# Patient Record
Sex: MALE | Race: WHITE | NOT HISPANIC OR LATINO | Employment: OTHER | RURAL
[De-identification: names, ages, dates, MRNs, and addresses within clinical notes are randomized per-mention and may not be internally consistent; named-entity substitution may affect disease eponyms.]

---

## 2017-01-23 ENCOUNTER — OFFICE VISIT (OUTPATIENT)
Dept: FAMILY MEDICINE CLINIC | Facility: CLINIC | Age: 82
End: 2017-01-23

## 2017-01-23 VITALS
WEIGHT: 149.6 LBS | BODY MASS INDEX: 23.48 KG/M2 | SYSTOLIC BLOOD PRESSURE: 131 MMHG | DIASTOLIC BLOOD PRESSURE: 63 MMHG | TEMPERATURE: 97.3 F | HEIGHT: 67 IN | HEART RATE: 66 BPM

## 2017-01-23 DIAGNOSIS — D69.6 THROMBOCYTOPENIA (HCC): ICD-10-CM

## 2017-01-23 DIAGNOSIS — I10 ESSENTIAL HYPERTENSION: Primary | ICD-10-CM

## 2017-01-23 LAB
BASOPHILS # BLD AUTO: 0.07 10*3/MM3 (ref 0–0.3)
BASOPHILS NFR BLD AUTO: 0.7 % (ref 0–2)
DEPRECATED RDW RBC AUTO: 43.6 FL (ref 37–54)
EOSINOPHIL # BLD AUTO: 0.18 10*3/MM3 (ref 0–0.7)
EOSINOPHIL NFR BLD AUTO: 1.9 % (ref 0–7)
ERYTHROCYTE [DISTWIDTH] IN BLOOD BY AUTOMATED COUNT: 13.3 % (ref 11.5–14.5)
HCT VFR BLD AUTO: 42.3 % (ref 42–52)
HGB BLD-MCNC: 14 G/DL (ref 14–18)
IMM GRANULOCYTES # BLD: 0.03 10*3/MM3 (ref 0–0.03)
IMM GRANULOCYTES NFR BLD: 0.3 % (ref 0–0.5)
LYMPHOCYTES # BLD AUTO: 1.25 10*3/MM3 (ref 1–3)
LYMPHOCYTES NFR BLD AUTO: 13.2 % (ref 16–46)
MCH RBC QN AUTO: 30.7 PG (ref 27–33)
MCHC RBC AUTO-ENTMCNC: 33.1 G/DL (ref 33–37)
MCV RBC AUTO: 92.8 FL (ref 80–94)
MONOCYTES # BLD AUTO: 0.83 10*3/MM3 (ref 0.1–0.9)
MONOCYTES NFR BLD AUTO: 8.8 % (ref 0–12)
NEUTROPHILS # BLD AUTO: 7.08 10*3/MM3 (ref 1.4–6.5)
NEUTROPHILS NFR BLD AUTO: 75.1 % (ref 40–75)
PLATELET # BLD AUTO: 133 10*3/MM3 (ref 130–400)
PMV BLD AUTO: 12.5 FL (ref 6–10)
RBC # BLD AUTO: 4.56 10*6/MM3 (ref 4.7–6.1)
WBC NRBC COR # BLD: 9.44 10*3/MM3 (ref 4.5–12.5)

## 2017-01-23 PROCEDURE — 99213 OFFICE O/P EST LOW 20 MIN: CPT | Performed by: FAMILY MEDICINE

## 2017-01-23 PROCEDURE — 36415 COLL VENOUS BLD VENIPUNCTURE: CPT | Performed by: FAMILY MEDICINE

## 2017-01-23 PROCEDURE — 85025 COMPLETE CBC W/AUTO DIFF WBC: CPT | Performed by: FAMILY MEDICINE

## 2017-01-23 NOTE — MR AVS SNAPSHOT
Hugo Colon   1/23/2017 10:40 AM   Office Visit    Dept Phone:  [phone]   Encounter #:  [id number]    Provider:  Sincere Jeffries MD   Department:  [facility]                Your Full Care Plan              Your Updated Medication List          This list is accurate as of: 1/23/17 11:48 AM.  Always use your most recent med list.                ASPIRIN LOW DOSE 81 MG tablet   Generic drug:  aspirin       benzonatate 100 MG capsule   Commonly known as:  TESSALON PERLES   Take 1 capsule by mouth 3 (Three) Times a Day As Needed for cough.       * CENTRUM SILVER ADULT 50+ tablet       * ICAPS PO       DUREZOL 0.05 % ophthalmic emulsion   Generic drug:  difluprednate       LORazepam 1 MG tablet   Commonly known as:  ATIVAN       losartan 50 MG tablet   Commonly known as:  COZAAR   Take 1 tablet by mouth Daily.       METAMUCIL 28.3 % powder   Generic drug:  Psyllium       simvastatin 10 MG tablet   Commonly known as:  ZOCOR   TAKE 1 TABLET DAILY AT BEDTIME       * Notice:  This list has 2 medication(s) that are the same as other medications prescribed for you. Read the directions carefully, and ask your doctor or other care provider to review them with you.            We Performed the Following     CBC & Differential       You Were Diagnosed With        Codes Comments    Essential hypertension    -  Primary ICD-10-CM: I10  ICD-9-CM: 401.9     Thrombocytopenia     ICD-10-CM: D69.6  ICD-9-CM: 287.5       Instructions     None    Patient Instructions History      Upcoming Appointments     Visit Type Date Time Department    FOLLOW UP 1/23/2017 10:40 AM DENNIS SCHULZ    FOLLOW UP 3/23/2017 11:20 AM REDDY SCHULZ      MyChart Signup     Our records indicate that you have declined [facility] sign[facility]. If you would like to sign up for [employer], please email [email or web address] [email or web address] or call [phone] to obtain an activation code.      "          Other Info from Your Visit           Your Appointments     Mar 23, 2017 11:20 AM EDT   Follow Up with Sincere Jeffries MD   [facility] (--)    [address] [place] [id number] [phone]           Arrive 15 minutes prior to appointment.              Allergies     Levofloxacin      Penicillins  Other (See Comments)    Made arms blue      Reason for Visit     Hypertension 2 month follow up      Vital Signs     Blood Pressure Pulse Temperature Height Weight Body Mass Index    131/63 (BP Location: Left arm, Patient Position: Sitting) 66 97.3 °F (36.3 °C) (Oral) 66.5\" (168.9 cm) 149 lb 9.6 oz (67.9 kg) 23.78 kg/m2    Smoking Status                   Former Smoker           Problems and Diagnoses Noted     High blood pressure    Decreased platelet count        "

## 2017-01-23 NOTE — PROGRESS NOTES
"Subjective   Hugo Colon is a 86 y.o. male.     History of Present Illness follow-up regarding hypertension lately count low.  Generally doing well.  Staying active.  Tolerating medications at current dosing.  [facility] [employer] [facility] days for meal.  Sleep is adequate.  No concern with ADLs.    The following portions of the patient's history were reviewed and updated as appropriate: allergies, current medications, past family history, past social history, past surgical history and problem list.    Review of Systems   Constitutional: Negative.    HENT: Negative.    Eyes: Negative.    Respiratory: Negative.    Cardiovascular: Negative.    Gastrointestinal: Negative.    Endocrine: Negative.    Genitourinary: Negative.    Musculoskeletal: Negative.    Skin: Negative.    Allergic/Immunologic: Negative.    Neurological: Negative.    Hematological: Negative.    Psychiatric/Behavioral: Negative.        Objective   Physical Exam   Constitutional: He is oriented to person, place, and time. He appears well-developed and well-nourished.   HENT:   Head: Normocephalic.   Mouth/Throat: Oropharynx is clear and moist.   Neck: Normal range of motion. Neck supple.   Cardiovascular: Normal rate, regular rhythm and normal heart sounds.    No murmur heard.  Pulmonary/Chest: Effort normal and breath sounds normal.   Neurological: He is alert and oriented to person, place, and time.   Skin: Skin is warm and dry.   Psychiatric: He has a normal mood and affect.   Vitals reviewed.    Visit Vitals   • /63 (BP Location: Left arm, Patient Position: Sitting)   • Pulse 66   • Temp 97.3 °F (36.3 °C) (Oral)   • Ht 66.5\" (168.9 cm)   • Wt 149 lb 9.6 oz (67.9 kg)   • BMI 23.78 kg/m2     Assessment/Plan   Problems Addressed this Visit        Cardiovascular and Mediastinum    Essential hypertension - Primary       Hematopoietic and Hemostatic    Thrombocytopenia    Relevant Orders    CBC & Differential    CBC Auto Differential      Blood " pressure looks good.  You appear to be doing exceptionally well.  Today will check labs to monitor platelet count.  Continue all medications as ordered reconciled.  Recheck in about 2 months or as needed.  Stay safely active.

## 2017-03-23 ENCOUNTER — OFFICE VISIT (OUTPATIENT)
Dept: FAMILY MEDICINE CLINIC | Facility: CLINIC | Age: 82
End: 2017-03-23

## 2017-03-23 VITALS
DIASTOLIC BLOOD PRESSURE: 68 MMHG | HEART RATE: 63 BPM | SYSTOLIC BLOOD PRESSURE: 124 MMHG | BODY MASS INDEX: 23.49 KG/M2 | WEIGHT: 149.7 LBS | HEIGHT: 67 IN | TEMPERATURE: 97 F

## 2017-03-23 DIAGNOSIS — G47.9 DYSSOMNIA: Primary | ICD-10-CM

## 2017-03-23 DIAGNOSIS — I10 ESSENTIAL HYPERTENSION: ICD-10-CM

## 2017-03-23 PROCEDURE — 99213 OFFICE O/P EST LOW 20 MIN: CPT | Performed by: FAMILY MEDICINE

## 2017-03-24 NOTE — PROGRESS NOTES
"Subjective   Hugo Colon is a 87 y.o. male.     History of Present Illness follow-up on blood pressure.  Tolerating medications as currently dosed.  Is enjoying life at this time.  Maintaining good social interactions.  Having no new problems.  Sleep patterns are stable.    The following portions of the patient's history were reviewed and updated as appropriate: allergies, past family history, past medical history, past social history, past surgical history and problem list.    Review of Systems   Constitutional: Negative.    HENT: Negative.    Eyes: Negative.    Respiratory: Negative.    Cardiovascular: Negative.    Gastrointestinal: Negative.    Endocrine: Negative.    Genitourinary: Negative.    Musculoskeletal: Negative.    Skin: Negative.    Allergic/Immunologic: Negative.    Neurological: Negative.    Hematological: Negative.    Psychiatric/Behavioral: Negative.        Objective   Physical Exam   Constitutional: He is oriented to person, place, and time. He appears well-developed and well-nourished.   HENT:   Head: Normocephalic.   Mouth/Throat: Oropharynx is clear and moist.   Neck: Normal range of motion. Neck supple.   Cardiovascular: Normal rate, regular rhythm and normal heart sounds.    No murmur heard.  Pulmonary/Chest: Effort normal and breath sounds normal.   Neurological: He is alert and oriented to person, place, and time.   Skin: Skin is warm and dry.   Psychiatric: He has a normal mood and affect.   Vitals reviewed.    /68 (BP Location: Left arm, Patient Position: Sitting)  Pulse 63  Temp 97 °F (36.1 °C) (Oral)   Ht 66.5\" (168.9 cm)  Wt 149 lb 11.2 oz (67.9 kg)  BMI 23.8 kg/m2  Assessment/Plan   Hugo was seen today for hypertension.    Diagnoses and all orders for this visit:    Dyssomnia    Essential hypertension      No changes.  Blood pressure looks good.  Stay safely active.  Maintain heart healthy diet.  Maintain medications as reconciled ordered.  Follow-up in a few months " routinely.

## 2017-05-22 ENCOUNTER — OFFICE VISIT (OUTPATIENT)
Dept: FAMILY MEDICINE CLINIC | Facility: CLINIC | Age: 82
End: 2017-05-22

## 2017-05-22 VITALS
SYSTOLIC BLOOD PRESSURE: 117 MMHG | HEART RATE: 66 BPM | TEMPERATURE: 97.2 F | DIASTOLIC BLOOD PRESSURE: 67 MMHG | BODY MASS INDEX: 24 KG/M2 | HEIGHT: 67 IN | WEIGHT: 152.9 LBS

## 2017-05-22 DIAGNOSIS — I10 ESSENTIAL HYPERTENSION: Primary | ICD-10-CM

## 2017-05-22 DIAGNOSIS — G47.9 DYSSOMNIA: ICD-10-CM

## 2017-05-22 PROCEDURE — 99213 OFFICE O/P EST LOW 20 MIN: CPT | Performed by: FAMILY MEDICINE

## 2017-05-22 RX ORDER — LORAZEPAM 1 MG/1
1 TABLET ORAL NIGHTLY
Qty: 30 TABLET | Refills: 2
Start: 2017-05-22 | End: 2019-01-01 | Stop reason: ALTCHOICE

## 2017-08-31 ENCOUNTER — OFFICE VISIT (OUTPATIENT)
Dept: FAMILY MEDICINE CLINIC | Facility: CLINIC | Age: 82
End: 2017-08-31

## 2017-08-31 VITALS
SYSTOLIC BLOOD PRESSURE: 131 MMHG | WEIGHT: 154.7 LBS | DIASTOLIC BLOOD PRESSURE: 65 MMHG | TEMPERATURE: 97.9 F | HEIGHT: 67 IN | BODY MASS INDEX: 24.28 KG/M2 | HEART RATE: 64 BPM

## 2017-08-31 DIAGNOSIS — J30.89 SEASONAL ALLERGIC RHINITIS DUE TO OTHER ALLERGIC TRIGGER: Primary | ICD-10-CM

## 2017-08-31 PROCEDURE — 96372 THER/PROPH/DIAG INJ SC/IM: CPT | Performed by: NURSE PRACTITIONER

## 2017-08-31 PROCEDURE — 99213 OFFICE O/P EST LOW 20 MIN: CPT | Performed by: NURSE PRACTITIONER

## 2017-08-31 RX ORDER — METHYLPREDNISOLONE ACETATE 80 MG/ML
80 INJECTION, SUSPENSION INTRA-ARTICULAR; INTRALESIONAL; INTRAMUSCULAR; SOFT TISSUE ONCE
Status: COMPLETED | OUTPATIENT
Start: 2017-08-31 | End: 2017-08-31

## 2017-08-31 RX ORDER — LOSARTAN POTASSIUM 50 MG/1
50 TABLET ORAL DAILY
Qty: 30 TABLET | Refills: 6 | Status: SHIPPED | OUTPATIENT
Start: 2017-08-31 | End: 2018-11-16 | Stop reason: SDUPTHER

## 2017-08-31 RX ADMIN — METHYLPREDNISOLONE ACETATE 80 MG: 80 INJECTION, SUSPENSION INTRA-ARTICULAR; INTRALESIONAL; INTRAMUSCULAR; SOFT TISSUE at 09:17

## 2017-10-20 ENCOUNTER — TELEPHONE (OUTPATIENT)
Dept: FAMILY MEDICINE CLINIC | Facility: CLINIC | Age: 82
End: 2017-10-20

## 2017-10-20 NOTE — TELEPHONE ENCOUNTER
I called the pharmacy, we sent on 8/31/17, they state they do have that prescription on file.  Tried to call the pt and let  Him know but no answer

## 2017-12-20 ENCOUNTER — OFFICE VISIT (OUTPATIENT)
Dept: RETAIL CLINIC | Facility: CLINIC | Age: 82
End: 2017-12-20

## 2017-12-20 VITALS
RESPIRATION RATE: 20 BRPM | WEIGHT: 160 LBS | HEART RATE: 64 BPM | TEMPERATURE: 99.8 F | BODY MASS INDEX: 25.44 KG/M2 | OXYGEN SATURATION: 98 %

## 2017-12-20 DIAGNOSIS — J10.1 INFLUENZA A: Primary | ICD-10-CM

## 2017-12-20 LAB
EXPIRATION DATE: ABNORMAL
FLUAV AG NPH QL: POSITIVE
FLUBV AG NPH QL: NEGATIVE
INTERNAL CONTROL: ABNORMAL
Lab: ABNORMAL

## 2017-12-20 PROCEDURE — 87804 INFLUENZA ASSAY W/OPTIC: CPT | Performed by: NURSE PRACTITIONER

## 2017-12-20 PROCEDURE — 99213 OFFICE O/P EST LOW 20 MIN: CPT | Performed by: NURSE PRACTITIONER

## 2017-12-20 RX ORDER — BENZONATATE 100 MG/1
200 CAPSULE ORAL NIGHTLY
Qty: 10 CAPSULE | Refills: 0 | Status: SHIPPED | OUTPATIENT
Start: 2017-12-20 | End: 2017-12-30

## 2017-12-20 NOTE — PROGRESS NOTES
Subjective   Hugo Colon is a 87 y.o. male.   Chief Complaint   Patient presents with   • URI      URI    This is a new problem. The current episode started in the past 7 days. The problem has been waxing and waning. The maximum temperature recorded prior to his arrival was 100.4 - 100.9 F. Associated symptoms include congestion, coughing (non-productive keeps him up at night), headaches, rhinorrhea (clear) and a sore throat. Pertinent negatives include no rash. He has tried NSAIDs (yesterday, none today) for the symptoms. The treatment provided mild relief.          Hugo presents to [facility] with cc of cough, congestion, low grade fever for several days (4-5).  Reviewed [facility], has had his annual Flu Vaccine.  See ROS.        The following portions of the patient's history were reviewed and updated as appropriate: allergies, current medications, past family history, past medical history, past social history, past surgical history and problem list.    Review of Systems   Constitutional: Positive for chills and fever.   HENT: Positive for congestion, rhinorrhea (clear) and sore throat.    Respiratory: Positive for cough (non-productive keeps him up at night).    Musculoskeletal: Positive for arthralgias and myalgias.   Skin: Negative for rash.   Neurological: Positive for headaches.     Pulse 64  Temp 99.8 °F (37.7 °C) (Temporal Artery )   Resp 20  Wt 72.6 kg (160 lb)  SpO2 98%  BMI 25.44 kg/m2    Objective     Current Outpatient Prescriptions:   •  aspirin (ASPIRIN LOW DOSE) 81 MG tablet, Take  by mouth daily., Disp: , Rfl:   •  Difluprednate (DUREZOL) 0.05 % emulsion, Apply  to eye 4 (Four) Times a Day As Needed., Disp: , Rfl:   •  LORazepam (ATIVAN) 1 MG tablet, Take 1 tablet by mouth Every Night., Disp: 30 tablet, Rfl: 2  •  losartan (COZAAR) 50 MG tablet, Take 1 tablet by mouth Daily., Disp: 30 tablet, Rfl: 6  •  Multiple Vitamins-Minerals (CENTRUM SILVER ADULT 50+) tablet, Take  by mouth daily., Disp: , Rfl:    •  Multiple Vitamins-Minerals (ICAPS PO), Take 1 tablet by mouth 2 (Two) Times a Day., Disp: , Rfl:   •  Psyllium (METAMUCIL) 28.3 % powder, Take  by mouth daily as needed., Disp: , Rfl:   •  simvastatin (ZOCOR) 10 MG tablet, TAKE 1 TABLET DAILY AT BEDTIME, Disp: 90 tablet, Rfl: 3  •  benzonatate (TESSALON PERLES) 100 MG capsule, Take 2 capsules by mouth Every Night for 10 days., Disp: 10 capsule, Rfl: 0  Allergies   Allergen Reactions   • Levofloxacin    • Penicillins Other (See Comments)     Made arms blue       Physical Exam   Constitutional: He is oriented to person, place, and time. He appears well-developed and well-nourished. No distress.   HENT:   Head: Normocephalic.   Right Ear: Tympanic membrane and external ear normal.   Left Ear: Tympanic membrane and external ear normal.   Nose: Mucosal edema present. Right sinus exhibits no maxillary sinus tenderness and no frontal sinus tenderness. Left sinus exhibits no maxillary sinus tenderness and no frontal sinus tenderness.   Mouth/Throat: Uvula is midline and mucous membranes are normal. Posterior oropharyngeal erythema (mild) present.   Eyes: Conjunctivae and EOM are normal. Pupils are equal, round, and reactive to light.   Neck: Normal range of motion. Neck supple.   Cardiovascular: Normal rate, regular rhythm and normal heart sounds.    Pulmonary/Chest: Effort normal and breath sounds normal. No respiratory distress.   Abdominal: Soft. Bowel sounds are normal. He exhibits no distension. There is no tenderness.   Musculoskeletal: Normal range of motion.   Lymphadenopathy:     He has no cervical adenopathy.   Neurological: He is alert and oriented to person, place, and time.   Skin: Skin is warm and dry. No rash noted.   Psychiatric: He has a normal mood and affect. His behavior is normal. Judgment and thought content normal.   Nursing note and vitals reviewed.      Assessment/Plan   Hugo was seen today for uri.    Diagnoses and all orders for this  visit:    Influenza A  -     POC Influenza A / B  -     benzonatate (TESSALON PERLES) 100 MG capsule; Take 2 capsules by mouth Every Night for 10 days.      Results for orders placed or performed in visit on 12/20/17   POC Influenza A / B   Result Value Ref Range    Rapid Influenza A Ag positive     Rapid Influenza B Ag negative     Internal Control Passed Passed    Lot Number [id number]     Expiration Date 4/19

## 2017-12-20 NOTE — PATIENT INSTRUCTIONS
"Influenza, Adult  Influenza, more commonly known as \"the flu,\" is a viral infection that primarily affects the respiratory tract. The respiratory tract includes organs that help you breathe, such as the lungs, nose, and throat. The flu causes many common cold symptoms, as well as a high fever and body aches.  The flu spreads easily from person to person (is contagious). Getting a flu shot (influenza vaccination) every year is the best way to prevent influenza.  CAUSES  Influenza is caused by a virus. You can catch the virus by:  · Breathing in droplets from an infected person's cough or sneeze.  · Touching something that was recently contaminated with the virus and then touching your mouth, nose, or eyes.  RISK FACTORS  The following factors may make you more likely to get the flu:  · Not cleaning your hands frequently with soap and water or alcohol-based hand .  · Having close contact with many people during cold and flu season.  · Touching your mouth, eyes, or nose without washing or sanitizing your hands first.  · Not drinking enough fluids or not eating a healthy diet.  · Not getting enough sleep or exercise.  · Being under a high amount of stress.  · Not getting a yearly (annual) flu shot.  You may be at a higher risk of complications from the flu, such as a severe lung infection (pneumonia), if you:  · Are over the age of 65.  · Are pregnant.  · Have a weakened disease-fighting system (immune system). You may have a weakened immune system if you:    Have HIV or AIDS.    Are undergoing chemotherapy.    Are taking medicines that reduce the activity of (suppress) the immune system.  · Have a long-term (chronic) illness, such as heart disease, kidney disease, diabetes, or lung disease.  · Have a liver disorder.  · Are obese.  · Have anemia.  SYMPTOMS  Symptoms of this condition typically last 4-10 days and may include:  · Fever.  · Chills.  · Headache, body aches, or muscle aches.  · Sore " throat.  · Cough.  · Runny or congested nose.  · Chest discomfort and cough.  · Poor appetite.  · Weakness or tiredness (fatigue).  · Dizziness.  · Nausea or vomiting.  DIAGNOSIS  This condition may be diagnosed based on your medical history and a physical exam. Your health care provider may do a nose or throat swab test to confirm the diagnosis.  TREATMENT  If influenza is detected early, you can be treated with antiviral medicine that can reduce the length of your illness and the severity of your symptoms. This medicine may be given by mouth (orally) or through an IV tube that is inserted in one of your veins.  The goal of treatment is to relieve symptoms by taking care of yourself at home. This may include taking over-the-counter medicines, drinking plenty of fluids, and adding humidity to the air in your home.  In some cases, influenza goes away on its own. Severe influenza or complications from influenza may be treated in a hospital.  HOME CARE INSTRUCTIONS  · Take over-the-counter and prescription medicines only as told by your health care provider.  · Use a cool mist humidifier to add humidity to the air in your home. This can make breathing easier.  · Rest as needed.  · Drink enough fluid to keep your urine clear or pale yellow.  · Cover your mouth and nose when you cough or sneeze.  · Wash your hands with soap and water often, especially after you cough or sneeze. If soap and water are not available, use hand .  · Stay home from work or school as told by your health care provider. Unless you are visiting your health care provider, try to avoid leaving home until your fever has been gone for 24 hours without the use of medicine.  · Keep all follow-up visits as told by your health care provider. This is important.  PREVENTION  · Getting an annual flu shot is the best way to avoid getting the flu. You may get the flu shot in late summer, fall, or winter. Ask your health care provider when you should  get your flu shot.  · Wash your hands often or use hand  often.  · Avoid contact with people who are sick during cold and flu season.  · Eat a healthy diet, drink plenty of fluids, get enough sleep, and exercise regularly.  SEEK MEDICAL CARE IF:  · You develop new symptoms.  · You have:    Chest pain.    Diarrhea.    A fever.  · Your cough gets worse.  · You produce more mucus.  · You feel nauseous or you vomit.  SEEK IMMEDIATE MEDICAL CARE IF:  · You develop shortness of breath or difficulty breathing.  · Your skin or nails turn a bluish color.  · You have severe pain or stiffness in your neck.  · You develop a sudden headache or sudden pain in your face or ear.  · You cannot stop vomiting.     This information is not intended to replace advice given to you by your health care provider. Make sure you discuss any questions you have with your health care provider.     Document Released: 12/15/2001 Document Revised: 04/10/2017 Document Reviewed: 10/11/2016  [employer] Interactive Patient Education ©2017 [employer]

## 2017-12-22 RX ORDER — SIMVASTATIN 10 MG
TABLET ORAL
Qty: 90 TABLET | Refills: 3 | OUTPATIENT
Start: 2017-12-22

## 2017-12-27 ENCOUNTER — TELEPHONE (OUTPATIENT)
Dept: FAMILY MEDICINE CLINIC | Facility: CLINIC | Age: 82
End: 2017-12-27

## 2017-12-27 NOTE — TELEPHONE ENCOUNTER
He thinks they just gave him a shot and he used up what ever pills they gave him, he was not sure what they were.

## 2017-12-27 NOTE — TELEPHONE ENCOUNTER
HAS A COUGH STARTED COUPLE DAYS AGO. DOESN'T HAVE FEVER.  CAN YOU SEND SOMETHING IN FOR HIM.  LEANNE  [phone]

## 2018-11-16 ENCOUNTER — OFFICE VISIT (OUTPATIENT)
Dept: FAMILY MEDICINE CLINIC | Facility: CLINIC | Age: 83
End: 2018-11-16

## 2018-11-16 VITALS
BODY MASS INDEX: 25.3 KG/M2 | TEMPERATURE: 97.3 F | WEIGHT: 157.4 LBS | SYSTOLIC BLOOD PRESSURE: 156 MMHG | HEART RATE: 67 BPM | DIASTOLIC BLOOD PRESSURE: 79 MMHG | HEIGHT: 66 IN

## 2018-11-16 DIAGNOSIS — Z12.5 PROSTATE CANCER SCREENING: ICD-10-CM

## 2018-11-16 DIAGNOSIS — J40 BRONCHITIS: ICD-10-CM

## 2018-11-16 DIAGNOSIS — I49.9 IRREGULAR HEART RHYTHM: Primary | ICD-10-CM

## 2018-11-16 DIAGNOSIS — J01.10 ACUTE NON-RECURRENT FRONTAL SINUSITIS: ICD-10-CM

## 2018-11-16 DIAGNOSIS — Z13.220 SCREENING FOR HYPERLIPIDEMIA: ICD-10-CM

## 2018-11-16 LAB
ALBUMIN SERPL-MCNC: 4 G/DL (ref 3.4–4.8)
ALBUMIN/GLOB SERPL: 1.5 G/DL (ref 1.5–2.5)
ALP SERPL-CCNC: 73 U/L (ref 40–129)
ALT SERPL W P-5'-P-CCNC: 17 U/L (ref 10–44)
ANION GAP SERPL CALCULATED.3IONS-SCNC: 4.3 MMOL/L (ref 3.6–11.2)
AST SERPL-CCNC: 21 U/L (ref 10–34)
BASOPHILS # BLD AUTO: 0.09 10*3/MM3 (ref 0–0.3)
BASOPHILS NFR BLD AUTO: 0.9 % (ref 0–2)
BILIRUB SERPL-MCNC: 0.7 MG/DL (ref 0.2–1.8)
BUN BLD-MCNC: 26 MG/DL (ref 7–21)
BUN/CREAT SERPL: 21.7 (ref 7–25)
CALCIUM SPEC-SCNC: 8.9 MG/DL (ref 7.7–10)
CHLORIDE SERPL-SCNC: 111 MMOL/L (ref 99–112)
CHOLEST SERPL-MCNC: 124 MG/DL (ref 0–200)
CO2 SERPL-SCNC: 28.7 MMOL/L (ref 24.3–31.9)
CREAT BLD-MCNC: 1.2 MG/DL (ref 0.43–1.29)
DEPRECATED RDW RBC AUTO: 42.3 FL (ref 37–54)
EOSINOPHIL # BLD AUTO: 0.59 10*3/MM3 (ref 0–0.7)
EOSINOPHIL NFR BLD AUTO: 6 % (ref 0–7)
ERYTHROCYTE [DISTWIDTH] IN BLOOD BY AUTOMATED COUNT: 12.8 % (ref 11.5–14.5)
GFR SERPL CREATININE-BSD FRML MDRD: 57 ML/MIN/1.73
GLOBULIN UR ELPH-MCNC: 2.6 GM/DL
GLUCOSE BLD-MCNC: 96 MG/DL (ref 70–110)
HCT VFR BLD AUTO: 42.2 % (ref 42–52)
HDLC SERPL-MCNC: 38 MG/DL (ref 60–100)
HGB BLD-MCNC: 14.5 G/DL (ref 14–18)
IMM GRANULOCYTES # BLD: 0.02 10*3/MM3 (ref 0–0.03)
IMM GRANULOCYTES NFR BLD: 0.2 % (ref 0–0.5)
LDLC SERPL CALC-MCNC: 76 MG/DL (ref 0–100)
LDLC/HDLC SERPL: 2 {RATIO}
LYMPHOCYTES # BLD AUTO: 1.33 10*3/MM3 (ref 1–3)
LYMPHOCYTES NFR BLD AUTO: 13.6 % (ref 16–46)
MCH RBC QN AUTO: 31.9 PG (ref 27–33)
MCHC RBC AUTO-ENTMCNC: 34.4 G/DL (ref 33–37)
MCV RBC AUTO: 92.7 FL (ref 80–94)
MONOCYTES # BLD AUTO: 0.89 10*3/MM3 (ref 0.1–0.9)
MONOCYTES NFR BLD AUTO: 9.1 % (ref 0–12)
NEUTROPHILS # BLD AUTO: 6.88 10*3/MM3 (ref 1.4–6.5)
NEUTROPHILS NFR BLD AUTO: 70.2 % (ref 40–75)
OSMOLALITY SERPL CALC.SUM OF ELEC: 291.5 MOSM/KG (ref 273–305)
PLATELET # BLD AUTO: 168 10*3/MM3 (ref 130–400)
PMV BLD AUTO: 11.8 FL (ref 6–10)
POTASSIUM BLD-SCNC: 4.9 MMOL/L (ref 3.5–5.3)
PROT SERPL-MCNC: 6.6 G/DL (ref 6–8)
PSA SERPL-MCNC: 1.87 NG/ML (ref 0–4)
RBC # BLD AUTO: 4.55 10*6/MM3 (ref 4.7–6.1)
SODIUM BLD-SCNC: 144 MMOL/L (ref 135–153)
TRIGL SERPL-MCNC: 50 MG/DL (ref 0–150)
VLDLC SERPL-MCNC: 10 MG/DL
WBC NRBC COR # BLD: 9.8 10*3/MM3 (ref 4.5–12.5)

## 2018-11-16 PROCEDURE — 99214 OFFICE O/P EST MOD 30 MIN: CPT | Performed by: NURSE PRACTITIONER

## 2018-11-16 PROCEDURE — G0103 PSA SCREENING: HCPCS | Performed by: NURSE PRACTITIONER

## 2018-11-16 PROCEDURE — 80053 COMPREHEN METABOLIC PANEL: CPT | Performed by: NURSE PRACTITIONER

## 2018-11-16 PROCEDURE — 93005 ELECTROCARDIOGRAM TRACING: CPT | Performed by: NURSE PRACTITIONER

## 2018-11-16 PROCEDURE — 96372 THER/PROPH/DIAG INJ SC/IM: CPT | Performed by: NURSE PRACTITIONER

## 2018-11-16 PROCEDURE — 36415 COLL VENOUS BLD VENIPUNCTURE: CPT | Performed by: NURSE PRACTITIONER

## 2018-11-16 PROCEDURE — 85025 COMPLETE CBC W/AUTO DIFF WBC: CPT | Performed by: NURSE PRACTITIONER

## 2018-11-16 PROCEDURE — 80061 LIPID PANEL: CPT | Performed by: NURSE PRACTITIONER

## 2018-11-16 RX ORDER — CHOLECALCIFEROL (VITAMIN D3) 125 MCG
5000 CAPSULE ORAL DAILY
COMMUNITY
End: 2019-01-01 | Stop reason: ALTCHOICE

## 2018-11-16 RX ORDER — DEXAMETHASONE SODIUM PHOSPHATE 4 MG/ML
4 INJECTION, SOLUTION INTRA-ARTICULAR; INTRALESIONAL; INTRAMUSCULAR; INTRAVENOUS; SOFT TISSUE ONCE
Status: COMPLETED | OUTPATIENT
Start: 2018-11-16 | End: 2018-11-16

## 2018-11-16 RX ORDER — BENZONATATE 100 MG/1
100 CAPSULE ORAL 3 TIMES DAILY PRN
Qty: 30 CAPSULE | Refills: 0 | Status: SHIPPED | OUTPATIENT
Start: 2018-11-16 | End: 2019-01-01

## 2018-11-16 RX ORDER — AZITHROMYCIN 250 MG/1
TABLET, FILM COATED ORAL
Qty: 6 TABLET | Refills: 0 | Status: SHIPPED | OUTPATIENT
Start: 2018-11-16 | End: 2019-01-01

## 2018-11-16 RX ORDER — LOSARTAN POTASSIUM 50 MG/1
50 TABLET ORAL DAILY
Qty: 30 TABLET | Refills: 6 | Status: SHIPPED | OUTPATIENT
Start: 2018-11-16 | End: 2019-01-01 | Stop reason: ALTCHOICE

## 2018-11-16 RX ADMIN — DEXAMETHASONE SODIUM PHOSPHATE 4 MG: 4 INJECTION, SOLUTION INTRA-ARTICULAR; INTRALESIONAL; INTRAMUSCULAR; INTRAVENOUS; SOFT TISSUE at 11:15

## 2018-11-16 NOTE — PROGRESS NOTES
Here today for report about 1 week of respiratory symptoms.  Greenish tent to mucuos when he coughs.  Sinus congestion noted, but unable to blow any mucous from nose.  Sinus pressure.  Had flu shot this year already.  Has some chills.

## 2018-11-16 NOTE — PROGRESS NOTES
Subjective   Hugo Colon is a 88 y.o. male.     Patient is presenting today for new symptoms of cough and sinus congestion.  Has been taking over-the-counter medications at home with no improvement.  He is in no acute distress today.  Denies any chest pain palpitations dizziness or other cardiopulmonary issues today.  He is reporting being unable to sleep well due to his cough that increases when he lies down.  He is afebrile today and vital signs are stable.  He denies fever does report having some chills over the last couple of days.  Coughing up mucus with green shade reported.  Denies any blood in sputum.  He is also prudent presenting for continued management of hypertension.  He is fasting today for blood work.         The following portions of the patient's history were reviewed and updated as appropriate: allergies, current medications, past family history, past medical history, past social history, past surgical history and problem list.    Review of Systems   Constitutional: Positive for chills and fatigue. Negative for appetite change and fever.   HENT: Positive for sinus pressure and sinus pain. Negative for congestion, sore throat, trouble swallowing and voice change.    Eyes: Negative for discharge, itching and visual disturbance.   Respiratory: Positive for cough. Negative for choking, chest tightness and shortness of breath.    Cardiovascular: Negative for chest pain, palpitations and leg swelling.   Gastrointestinal: Negative for abdominal pain, blood in stool, constipation, diarrhea and nausea.   Endocrine: Negative.    Musculoskeletal: Negative for arthralgias, back pain and myalgias.   Skin: Negative for rash and wound.   Allergic/Immunologic: Positive for environmental allergies (seasonal only).   Neurological: Negative for dizziness, weakness and headaches.   Hematological: Does not bruise/bleed easily.   Psychiatric/Behavioral: Negative for sleep disturbance. The patient is not  nervous/anxious.        Objective   Physical Exam   Constitutional: He appears well-developed. No distress.   HENT:   Head: Normocephalic and atraumatic.   Nose: Mucosal edema present.   Eyes: Conjunctivae and EOM are normal.   Neck: Neck supple. No JVD present.   Cardiovascular: Normal heart sounds. An irregular rhythm present.   Patient's heart rate is regular today.  Denies any known history of irregular heart rate or other cardiac issues.  Has not been evaluated by cardiology that he can remember.   Pulmonary/Chest: Effort normal. No respiratory distress. He has decreased breath sounds in the right lower field and the left lower field.   Abdominal: Soft. Bowel sounds are normal. He exhibits no distension.   Musculoskeletal: Normal range of motion. He exhibits no edema.   Neurological: He is alert.   Skin: Skin is warm. No rash noted.   Psychiatric: He has a normal mood and affect.       Assessment/Plan   Hugo was seen today for cough and hypertension.  We will administer dexamethasone 4 mg IM ×1 dose today.  We will prescribe Z-Jason 500 mg first day followed by 250 mg daily ×4 more days.  Will prescribe Tessalon Perles for cough.  Instructed to increase fluids and rest.  Avoid any allergens or irritants.  Report any increaseor worsening symptoms of shortness of breath,  chest pain, palpitations dizziness, or other symptoms.  EKG completed today in office.  Showing underlying sinus rhythm with frequent premature ventricular contractions.  Low voltage criteria.  Discussed with patient and patient is willing to complete referral to cardiology for further workup possibly echocardiogram.  He is denying any chest pain or history of chest pain however does have some fatigue and shortness of breath at times with activity.  Fasting labs obtained today.  Diagnoses and all orders for this visit:    Irregular heart rhythm  -     ECG 12 Lead  -     CBC & Differential; Future  -     Comprehensive Metabolic Panel; Future  -      Comprehensive Metabolic Panel  -     CBC & Differential  -     CBC Auto Differential    Screening for hyperlipidemia  -     Lipid Panel; Future  -     Lipid Panel    Prostate cancer screening  -     PSA Screen; Future  -     PSA Screen    Bronchitis  -     dexamethasone (DECADRON) injection 4 mg; Infuse 1 mL into a venous catheter 1 (One) Time.    Acute non-recurrent frontal sinusitis  -     dexamethasone (DECADRON) injection 4 mg; Infuse 1 mL into a venous catheter 1 (One) Time.    Other orders  -     benzonatate (TESSALON PERLES) 100 MG capsule; Take 1 capsule by mouth 3 (Three) Times a Day As Needed for Cough.  -     losartan (COZAAR) 50 MG tablet; Take 1 tablet by mouth Daily.  -     azithromycin (ZITHROMAX) 250 MG tablet; Take 2 tablets the first day, then 1 tablet daily for 4 days.

## 2019-01-01 ENCOUNTER — READMISSION MANAGEMENT (OUTPATIENT)
Dept: CALL CENTER | Facility: HOSPITAL | Age: 84
End: 2019-01-01

## 2019-01-01 ENCOUNTER — OFFICE VISIT (OUTPATIENT)
Dept: CARDIOLOGY | Facility: CLINIC | Age: 84
End: 2019-01-01

## 2019-01-01 ENCOUNTER — HOSPITAL ENCOUNTER (INPATIENT)
Facility: HOSPITAL | Age: 84
LOS: 3 days | Discharge: HOME HEALTH | End: 2019-12-07
Attending: FAMILY MEDICINE | Admitting: INTERNAL MEDICINE

## 2019-01-01 ENCOUNTER — CONSULT (OUTPATIENT)
Dept: CARDIOLOGY | Facility: CLINIC | Age: 84
End: 2019-01-01

## 2019-01-01 ENCOUNTER — TELEPHONE (OUTPATIENT)
Dept: CARDIOLOGY | Facility: CLINIC | Age: 84
End: 2019-01-01

## 2019-01-01 ENCOUNTER — APPOINTMENT (OUTPATIENT)
Dept: CT IMAGING | Facility: HOSPITAL | Age: 84
End: 2019-01-01

## 2019-01-01 ENCOUNTER — HOSPITAL ENCOUNTER (INPATIENT)
Facility: HOSPITAL | Age: 84
LOS: 3 days | Discharge: HOME | End: 2019-11-06
Attending: EMERGENCY MEDICINE | Admitting: INTERNAL MEDICINE

## 2019-01-01 ENCOUNTER — HOSPITAL ENCOUNTER (OUTPATIENT)
Dept: RESPIRATORY THERAPY | Facility: HOSPITAL | Age: 84
Discharge: HOME | End: 2019-11-13
Admitting: NURSE PRACTITIONER

## 2019-01-01 ENCOUNTER — LAB (OUTPATIENT)
Dept: LAB | Facility: HOSPITAL | Age: 84
End: 2019-01-01

## 2019-01-01 ENCOUNTER — APPOINTMENT (OUTPATIENT)
Dept: CARDIOLOGY | Facility: HOSPITAL | Age: 84
End: 2019-01-01

## 2019-01-01 ENCOUNTER — OFFICE VISIT (OUTPATIENT)
Dept: FAMILY MEDICINE CLINIC | Facility: CLINIC | Age: 84
End: 2019-01-01

## 2019-01-01 ENCOUNTER — HOSPITAL ENCOUNTER (OUTPATIENT)
Dept: CARDIOLOGY | Facility: HOSPITAL | Age: 84
Discharge: HOME | End: 2019-07-16
Admitting: INTERNAL MEDICINE

## 2019-01-01 ENCOUNTER — APPOINTMENT (OUTPATIENT)
Dept: GENERAL RADIOLOGY | Facility: HOSPITAL | Age: 84
End: 2019-01-01

## 2019-01-01 ENCOUNTER — TRANSITIONAL CARE MANAGEMENT TELEPHONE ENCOUNTER (OUTPATIENT)
Dept: FAMILY MEDICINE CLINIC | Facility: CLINIC | Age: 84
End: 2019-01-01

## 2019-01-01 ENCOUNTER — LAB (OUTPATIENT)
Dept: FAMILY MEDICINE CLINIC | Facility: CLINIC | Age: 84
End: 2019-01-01

## 2019-01-01 VITALS
HEIGHT: 67 IN | BODY MASS INDEX: 23.86 KG/M2 | HEART RATE: 115 BPM | DIASTOLIC BLOOD PRESSURE: 93 MMHG | SYSTOLIC BLOOD PRESSURE: 142 MMHG | TEMPERATURE: 96.2 F | OXYGEN SATURATION: 78 % | RESPIRATION RATE: 20 BRPM | WEIGHT: 152 LBS

## 2019-01-01 VITALS
HEIGHT: 67 IN | DIASTOLIC BLOOD PRESSURE: 78 MMHG | OXYGEN SATURATION: 94 % | RESPIRATION RATE: 18 BRPM | BODY MASS INDEX: 23.2 KG/M2 | HEART RATE: 100 BPM | TEMPERATURE: 97.4 F | WEIGHT: 147.8 LBS | SYSTOLIC BLOOD PRESSURE: 119 MMHG

## 2019-01-01 VITALS
HEIGHT: 67 IN | SYSTOLIC BLOOD PRESSURE: 120 MMHG | BODY MASS INDEX: 22.76 KG/M2 | DIASTOLIC BLOOD PRESSURE: 78 MMHG | OXYGEN SATURATION: 97 % | HEART RATE: 73 BPM | WEIGHT: 145 LBS

## 2019-01-01 VITALS
SYSTOLIC BLOOD PRESSURE: 140 MMHG | HEART RATE: 120 BPM | WEIGHT: 160 LBS | DIASTOLIC BLOOD PRESSURE: 74 MMHG | OXYGEN SATURATION: 95 % | HEIGHT: 67 IN | BODY MASS INDEX: 25.11 KG/M2

## 2019-01-01 VITALS
HEIGHT: 67 IN | OXYGEN SATURATION: 91 % | BODY MASS INDEX: 25.11 KG/M2 | SYSTOLIC BLOOD PRESSURE: 126 MMHG | WEIGHT: 160 LBS | DIASTOLIC BLOOD PRESSURE: 72 MMHG | HEART RATE: 89 BPM

## 2019-01-01 VITALS
SYSTOLIC BLOOD PRESSURE: 122 MMHG | HEART RATE: 128 BPM | BODY MASS INDEX: 24.27 KG/M2 | HEIGHT: 67 IN | WEIGHT: 154.6 LBS | DIASTOLIC BLOOD PRESSURE: 74 MMHG

## 2019-01-01 VITALS
HEART RATE: 83 BPM | WEIGHT: 156 LBS | BODY MASS INDEX: 25.99 KG/M2 | DIASTOLIC BLOOD PRESSURE: 70 MMHG | OXYGEN SATURATION: 98 % | HEIGHT: 65 IN | SYSTOLIC BLOOD PRESSURE: 126 MMHG

## 2019-01-01 VITALS
SYSTOLIC BLOOD PRESSURE: 118 MMHG | HEIGHT: 67 IN | HEART RATE: 73 BPM | WEIGHT: 157.1 LBS | DIASTOLIC BLOOD PRESSURE: 84 MMHG | TEMPERATURE: 97.4 F | BODY MASS INDEX: 24.66 KG/M2

## 2019-01-01 VITALS
TEMPERATURE: 97.8 F | BODY MASS INDEX: 25.67 KG/M2 | HEIGHT: 66 IN | SYSTOLIC BLOOD PRESSURE: 130 MMHG | DIASTOLIC BLOOD PRESSURE: 74 MMHG | HEART RATE: 111 BPM | OXYGEN SATURATION: 92 % | WEIGHT: 159.7 LBS

## 2019-01-01 VITALS
DIASTOLIC BLOOD PRESSURE: 68 MMHG | HEIGHT: 67 IN | BODY MASS INDEX: 22.96 KG/M2 | OXYGEN SATURATION: 99 % | HEART RATE: 52 BPM | SYSTOLIC BLOOD PRESSURE: 142 MMHG | WEIGHT: 146.3 LBS | TEMPERATURE: 97.2 F

## 2019-01-01 VITALS
RESPIRATION RATE: 16 BRPM | HEART RATE: 110 BPM | HEIGHT: 67 IN | OXYGEN SATURATION: 89 % | WEIGHT: 155 LBS | SYSTOLIC BLOOD PRESSURE: 110 MMHG | DIASTOLIC BLOOD PRESSURE: 78 MMHG | BODY MASS INDEX: 24.33 KG/M2

## 2019-01-01 VITALS
WEIGHT: 146 LBS | BODY MASS INDEX: 22.91 KG/M2 | DIASTOLIC BLOOD PRESSURE: 60 MMHG | HEIGHT: 67 IN | OXYGEN SATURATION: 98 % | SYSTOLIC BLOOD PRESSURE: 106 MMHG | HEART RATE: 82 BPM

## 2019-01-01 VITALS
HEART RATE: 121 BPM | SYSTOLIC BLOOD PRESSURE: 128 MMHG | WEIGHT: 157 LBS | BODY MASS INDEX: 26.16 KG/M2 | OXYGEN SATURATION: 98 % | DIASTOLIC BLOOD PRESSURE: 74 MMHG | HEIGHT: 65 IN

## 2019-01-01 VITALS
HEIGHT: 67 IN | DIASTOLIC BLOOD PRESSURE: 74 MMHG | HEART RATE: 72 BPM | WEIGHT: 153 LBS | BODY MASS INDEX: 24.01 KG/M2 | SYSTOLIC BLOOD PRESSURE: 100 MMHG | OXYGEN SATURATION: 98 %

## 2019-01-01 DIAGNOSIS — Z79.01 CHRONIC ANTICOAGULATION: ICD-10-CM

## 2019-01-01 DIAGNOSIS — E03.9 HYPOTHYROIDISM, UNSPECIFIED TYPE: ICD-10-CM

## 2019-01-01 DIAGNOSIS — R54 ADVANCED AGE: ICD-10-CM

## 2019-01-01 DIAGNOSIS — I48.19 PERSISTENT ATRIAL FIBRILLATION (HCC): Primary | ICD-10-CM

## 2019-01-01 DIAGNOSIS — I35.0 NONRHEUMATIC AORTIC VALVE STENOSIS: ICD-10-CM

## 2019-01-01 DIAGNOSIS — N18.2 CHRONIC RENAL FAILURE, STAGE 2 (MILD): ICD-10-CM

## 2019-01-01 DIAGNOSIS — I35.0 NONRHEUMATIC AORTIC VALVE STENOSIS: Chronic | ICD-10-CM

## 2019-01-01 DIAGNOSIS — E87.5 HYPERKALEMIA: ICD-10-CM

## 2019-01-01 DIAGNOSIS — I48.91 ATRIAL FIBRILLATION, NEW ONSET (HCC): ICD-10-CM

## 2019-01-01 DIAGNOSIS — Z79.01 CHRONIC ANTICOAGULATION: Chronic | ICD-10-CM

## 2019-01-01 DIAGNOSIS — I48.19 PERSISTENT ATRIAL FIBRILLATION (HCC): ICD-10-CM

## 2019-01-01 DIAGNOSIS — R41.3 MEMORY LOSS OF UNKNOWN CAUSE: ICD-10-CM

## 2019-01-01 DIAGNOSIS — E03.8 OTHER SPECIFIED HYPOTHYROIDISM: ICD-10-CM

## 2019-01-01 DIAGNOSIS — I48.91 ATRIAL FIBRILLATION, NEW ONSET (HCC): Primary | ICD-10-CM

## 2019-01-01 DIAGNOSIS — E78.5 HYPERLIPIDEMIA, UNSPECIFIED HYPERLIPIDEMIA TYPE: ICD-10-CM

## 2019-01-01 DIAGNOSIS — I50.40 COMBINED SYSTOLIC AND DIASTOLIC CONGESTIVE HEART FAILURE, UNSPECIFIED HF CHRONICITY (HCC): ICD-10-CM

## 2019-01-01 DIAGNOSIS — R00.2 PALPITATIONS: ICD-10-CM

## 2019-01-01 DIAGNOSIS — N18.30 CHRONIC RENAL FAILURE, STAGE 3 (MODERATE) (HCC): ICD-10-CM

## 2019-01-01 DIAGNOSIS — E03.9 HYPOTHYROIDISM, UNSPECIFIED TYPE: Primary | ICD-10-CM

## 2019-01-01 DIAGNOSIS — R54 ADVANCED AGE: Chronic | ICD-10-CM

## 2019-01-01 DIAGNOSIS — I83.899 BLEEDING FROM VARICOSE VEIN: ICD-10-CM

## 2019-01-01 DIAGNOSIS — I35.0 NONRHEUMATIC AORTIC VALVE STENOSIS: Primary | ICD-10-CM

## 2019-01-01 DIAGNOSIS — I48.91 ATRIAL FIBRILLATION WITH RAPID VENTRICULAR RESPONSE (HCC): Primary | ICD-10-CM

## 2019-01-01 DIAGNOSIS — I10 ESSENTIAL HYPERTENSION: ICD-10-CM

## 2019-01-01 DIAGNOSIS — N18.30 CHRONIC RENAL FAILURE, STAGE 3 (MODERATE) (HCC): Chronic | ICD-10-CM

## 2019-01-01 DIAGNOSIS — D72.829 LEUKOCYTOSIS, UNSPECIFIED TYPE: ICD-10-CM

## 2019-01-01 DIAGNOSIS — I48.19 ATRIAL FIBRILLATION, PERSISTENT (HCC): Chronic | ICD-10-CM

## 2019-01-01 DIAGNOSIS — R00.2 PALPITATIONS: Primary | ICD-10-CM

## 2019-01-01 DIAGNOSIS — N18.30 CHRONIC RENAL FAILURE, STAGE 3 (MODERATE) (HCC): Primary | ICD-10-CM

## 2019-01-01 DIAGNOSIS — I50.23 ACUTE ON CHRONIC SYSTOLIC CONGESTIVE HEART FAILURE (HCC): ICD-10-CM

## 2019-01-01 DIAGNOSIS — F41.9 ANXIETY: ICD-10-CM

## 2019-01-01 DIAGNOSIS — I48.91 ATRIAL FIBRILLATION WITH RVR (HCC): Primary | ICD-10-CM

## 2019-01-01 LAB
A-A DO2: 2.4 MMHG (ref 0–300)
ACETONE BLD QL: NEGATIVE
ALBUMIN SERPL-MCNC: 3.36 G/DL (ref 3.5–5.2)
ALBUMIN SERPL-MCNC: 3.45 G/DL (ref 3.5–5.2)
ALBUMIN SERPL-MCNC: 3.47 G/DL (ref 3.5–5.2)
ALBUMIN SERPL-MCNC: 3.51 G/DL (ref 3.5–5.2)
ALBUMIN SERPL-MCNC: 3.74 G/DL (ref 3.5–5.2)
ALBUMIN SERPL-MCNC: 3.9 G/DL (ref 3.5–5.2)
ALBUMIN SERPL-MCNC: 4.1 G/DL (ref 3.5–5.2)
ALBUMIN SERPL-MCNC: 4.46 G/DL (ref 3.5–5.2)
ALBUMIN/GLOB SERPL: 1.3 G/DL
ALBUMIN/GLOB SERPL: 1.3 G/DL
ALBUMIN/GLOB SERPL: 1.4 G/DL
ALBUMIN/GLOB SERPL: 1.4 G/DL
ALBUMIN/GLOB SERPL: 1.5 G/DL
ALBUMIN/GLOB SERPL: 1.6 G/DL
ALP SERPL-CCNC: 117 U/L (ref 39–117)
ALP SERPL-CCNC: 68 U/L (ref 39–117)
ALP SERPL-CCNC: 69 U/L (ref 39–117)
ALP SERPL-CCNC: 70 U/L (ref 39–117)
ALP SERPL-CCNC: 73 U/L (ref 39–117)
ALP SERPL-CCNC: 76 U/L (ref 39–117)
ALP SERPL-CCNC: 85 U/L (ref 39–117)
ALP SERPL-CCNC: 87 U/L (ref 39–117)
ALT SERPL W P-5'-P-CCNC: 14 U/L (ref 1–41)
ALT SERPL W P-5'-P-CCNC: 22 U/L (ref 1–41)
ALT SERPL W P-5'-P-CCNC: 22 U/L (ref 1–41)
ALT SERPL W P-5'-P-CCNC: 25 U/L (ref 1–41)
ALT SERPL W P-5'-P-CCNC: 27 U/L (ref 1–41)
ALT SERPL W P-5'-P-CCNC: 65 U/L (ref 1–41)
ALT SERPL W P-5'-P-CCNC: 72 U/L (ref 1–41)
ALT SERPL W P-5'-P-CCNC: 81 U/L (ref 1–41)
ANION GAP SERPL CALCULATED.3IONS-SCNC: 11.8 MMOL/L (ref 5–15)
ANION GAP SERPL CALCULATED.3IONS-SCNC: 12.1 MMOL/L (ref 5–15)
ANION GAP SERPL CALCULATED.3IONS-SCNC: 13.4 MMOL/L (ref 5–15)
ANION GAP SERPL CALCULATED.3IONS-SCNC: 13.6 MMOL/L
ANION GAP SERPL CALCULATED.3IONS-SCNC: 13.9 MMOL/L (ref 5–15)
ANION GAP SERPL CALCULATED.3IONS-SCNC: 14 MMOL/L (ref 5–15)
ANION GAP SERPL CALCULATED.3IONS-SCNC: 14.1 MMOL/L (ref 5–15)
ANION GAP SERPL CALCULATED.3IONS-SCNC: 14.5 MMOL/L (ref 5–15)
ANION GAP SERPL CALCULATED.3IONS-SCNC: 16 MMOL/L (ref 5–15)
ANION GAP SERPL CALCULATED.3IONS-SCNC: 18.8 MMOL/L (ref 5–15)
ANION GAP SERPL CALCULATED.3IONS-SCNC: 8.3 MMOL/L (ref 5–15)
APTT PPP: 22 SECONDS (ref 23.8–36.1)
ARTERIAL PATENCY WRIST A: ABNORMAL
AST SERPL-CCNC: 110 U/L (ref 1–40)
AST SERPL-CCNC: 17 U/L (ref 1–40)
AST SERPL-CCNC: 22 U/L (ref 1–40)
AST SERPL-CCNC: 25 U/L (ref 1–40)
AST SERPL-CCNC: 25 U/L (ref 1–40)
AST SERPL-CCNC: 27 U/L (ref 1–40)
AST SERPL-CCNC: 47 U/L (ref 1–40)
AST SERPL-CCNC: 73 U/L (ref 1–40)
ATMOSPHERIC PRESS: 726 MMHG
BACTERIA SPEC AEROBE CULT: NORMAL
BACTERIA SPEC AEROBE CULT: NORMAL
BASE EXCESS BLDA CALC-SCNC: -5.4 MMOL/L (ref 0–2)
BASOPHILS # BLD AUTO: 0.07 10*3/MM3 (ref 0–0.2)
BASOPHILS # BLD AUTO: 0.09 10*3/MM3 (ref 0–0.2)
BASOPHILS # BLD AUTO: 0.11 10*3/MM3 (ref 0–0.2)
BASOPHILS # BLD AUTO: 0.11 10*3/MM3 (ref 0–0.2)
BASOPHILS # BLD AUTO: 0.12 10*3/MM3 (ref 0–0.2)
BASOPHILS # BLD AUTO: 0.12 10*3/MM3 (ref 0–0.2)
BASOPHILS NFR BLD AUTO: 0.6 % (ref 0–1.5)
BASOPHILS NFR BLD AUTO: 0.9 % (ref 0–1.5)
BASOPHILS NFR BLD AUTO: 1.1 % (ref 0–1.5)
BASOPHILS NFR BLD AUTO: 1.4 % (ref 0–1.5)
BDY SITE: ABNORMAL
BH CV ECHO MEAS - % IVS THICK: 1.8 %
BH CV ECHO MEAS - % IVS THICK: 43.6 %
BH CV ECHO MEAS - % LVPW THICK: 19.3 %
BH CV ECHO MEAS - % LVPW THICK: 25.3 %
BH CV ECHO MEAS - ACS: 0.92 CM
BH CV ECHO MEAS - ACS: 1.1 CM
BH CV ECHO MEAS - AI DEC SLOPE: 171.3 CM/SEC^2
BH CV ECHO MEAS - AI MAX PG: 55.2 MMHG
BH CV ECHO MEAS - AI MAX VEL: 371.5 CM/SEC
BH CV ECHO MEAS - AI P1/2T: 635.1 MSEC
BH CV ECHO MEAS - AO MAX PG (FULL): 19.5 MMHG
BH CV ECHO MEAS - AO MAX PG (FULL): 21.8 MMHG
BH CV ECHO MEAS - AO MAX PG: 21.5 MMHG
BH CV ECHO MEAS - AO MAX PG: 25.2 MMHG
BH CV ECHO MEAS - AO MEAN PG (FULL): 12.3 MMHG
BH CV ECHO MEAS - AO MEAN PG (FULL): 12.7 MMHG
BH CV ECHO MEAS - AO MEAN PG: 13.5 MMHG
BH CV ECHO MEAS - AO MEAN PG: 14.4 MMHG
BH CV ECHO MEAS - AO ROOT AREA (BSA CORRECTED): 1.6
BH CV ECHO MEAS - AO ROOT AREA (BSA CORRECTED): 1.7
BH CV ECHO MEAS - AO ROOT AREA: 6 CM^2
BH CV ECHO MEAS - AO ROOT AREA: 7.4 CM^2
BH CV ECHO MEAS - AO ROOT DIAM: 2.8 CM
BH CV ECHO MEAS - AO ROOT DIAM: 3.1 CM
BH CV ECHO MEAS - AO V2 MAX: 231.1 CM/SEC
BH CV ECHO MEAS - AO V2 MAX: 251 CM/SEC
BH CV ECHO MEAS - AO V2 MEAN: 174.2 CM/SEC
BH CV ECHO MEAS - AO V2 MEAN: 176.2 CM/SEC
BH CV ECHO MEAS - AO V2 VTI: 45.5 CM
BH CV ECHO MEAS - AO V2 VTI: 46.8 CM
BH CV ECHO MEAS - AVA(I,A): 0.86 CM^2
BH CV ECHO MEAS - AVA(I,A): 0.97 CM^2
BH CV ECHO MEAS - AVA(I,D): 0.86 CM^2
BH CV ECHO MEAS - AVA(I,D): 0.97 CM^2
BH CV ECHO MEAS - AVA(V,A): 0.88 CM^2
BH CV ECHO MEAS - AVA(V,A): 1.2 CM^2
BH CV ECHO MEAS - AVA(V,D): 0.88 CM^2
BH CV ECHO MEAS - AVA(V,D): 1.2 CM^2
BH CV ECHO MEAS - BSA(HAYCOCK): 1.8 M^2
BH CV ECHO MEAS - BSA(HAYCOCK): 1.8 M^2
BH CV ECHO MEAS - BSA: 1.8 M^2
BH CV ECHO MEAS - BSA: 1.8 M^2
BH CV ECHO MEAS - BZI_BMI: 24 KILOGRAMS/M^2
BH CV ECHO MEAS - BZI_BMI: 26.1 KILOGRAMS/M^2
BH CV ECHO MEAS - BZI_METRIC_HEIGHT: 165.1 CM
BH CV ECHO MEAS - BZI_METRIC_HEIGHT: 170.2 CM
BH CV ECHO MEAS - BZI_METRIC_WEIGHT: 69.4 KG
BH CV ECHO MEAS - BZI_METRIC_WEIGHT: 71.2 KG
BH CV ECHO MEAS - EDV(CUBED): 75.2 ML
BH CV ECHO MEAS - EDV(CUBED): 93.4 ML
BH CV ECHO MEAS - EDV(MOD-SP4): 46 ML
BH CV ECHO MEAS - EDV(MOD-SP4): 62 ML
BH CV ECHO MEAS - EDV(TEICH): 79.5 ML
BH CV ECHO MEAS - EDV(TEICH): 94.2 ML
BH CV ECHO MEAS - EF(CUBED): 30.6 %
BH CV ECHO MEAS - EF(CUBED): 41.6 %
BH CV ECHO MEAS - EF(MOD-SP4): 41.3 %
BH CV ECHO MEAS - EF(MOD-SP4): 41.9 %
BH CV ECHO MEAS - EF(TEICH): 25 %
BH CV ECHO MEAS - EF(TEICH): 34.8 %
BH CV ECHO MEAS - ESV(CUBED): 43.9 ML
BH CV ECHO MEAS - ESV(CUBED): 64.8 ML
BH CV ECHO MEAS - ESV(MOD-SP4): 27 ML
BH CV ECHO MEAS - ESV(MOD-SP4): 36 ML
BH CV ECHO MEAS - ESV(TEICH): 51.8 ML
BH CV ECHO MEAS - ESV(TEICH): 70.7 ML
BH CV ECHO MEAS - FS: 11.5 %
BH CV ECHO MEAS - FS: 16.4 %
BH CV ECHO MEAS - IVS/LVPW: 0.7
BH CV ECHO MEAS - IVS/LVPW: 1.1
BH CV ECHO MEAS - IVSD: 1 CM
BH CV ECHO MEAS - IVSD: 1 CM
BH CV ECHO MEAS - IVSS: 1 CM
BH CV ECHO MEAS - IVSS: 1.4 CM
BH CV ECHO MEAS - LA DIMENSION: 3.6 CM
BH CV ECHO MEAS - LA DIMENSION: 4.5 CM
BH CV ECHO MEAS - LA/AO: 1.3
BH CV ECHO MEAS - LA/AO: 1.5
BH CV ECHO MEAS - LV DIASTOLIC VOL/BSA (35-75): 25.5 ML/M^2
BH CV ECHO MEAS - LV DIASTOLIC VOL/BSA (35-75): 34.7 ML/M^2
BH CV ECHO MEAS - LV MASS(C)D: 156.4 GRAMS
BH CV ECHO MEAS - LV MASS(C)D: 182.4 GRAMS
BH CV ECHO MEAS - LV MASS(C)DI: 101.1 GRAMS/M^2
BH CV ECHO MEAS - LV MASS(C)DI: 87.6 GRAMS/M^2
BH CV ECHO MEAS - LV MASS(C)S: 148.3 GRAMS
BH CV ECHO MEAS - LV MASS(C)S: 220 GRAMS
BH CV ECHO MEAS - LV MASS(C)SI: 121.9 GRAMS/M^2
BH CV ECHO MEAS - LV MASS(C)SI: 83.1 GRAMS/M^2
BH CV ECHO MEAS - LV MAX PG: 2 MMHG
BH CV ECHO MEAS - LV MAX PG: 3.4 MMHG
BH CV ECHO MEAS - LV MEAN PG: 1.2 MMHG
BH CV ECHO MEAS - LV MEAN PG: 1.7 MMHG
BH CV ECHO MEAS - LV SYSTOLIC VOL/BSA (12-30): 15 ML/M^2
BH CV ECHO MEAS - LV SYSTOLIC VOL/BSA (12-30): 20.2 ML/M^2
BH CV ECHO MEAS - LV V1 MAX: 71 CM/SEC
BH CV ECHO MEAS - LV V1 MAX: 92.8 CM/SEC
BH CV ECHO MEAS - LV V1 MEAN: 52 CM/SEC
BH CV ECHO MEAS - LV V1 MEAN: 60 CM/SEC
BH CV ECHO MEAS - LV V1 VTI: 13.5 CM
BH CV ECHO MEAS - LV V1 VTI: 14.1 CM
BH CV ECHO MEAS - LVIDD: 4.2 CM
BH CV ECHO MEAS - LVIDD: 4.5 CM
BH CV ECHO MEAS - LVIDS: 3.5 CM
BH CV ECHO MEAS - LVIDS: 4 CM
BH CV ECHO MEAS - LVLD AP4: 6.8 CM
BH CV ECHO MEAS - LVLD AP4: 7.3 CM
BH CV ECHO MEAS - LVLS AP4: 6.4 CM
BH CV ECHO MEAS - LVLS AP4: 6.8 CM
BH CV ECHO MEAS - LVOT AREA (M): 2.8 CM^2
BH CV ECHO MEAS - LVOT AREA (M): 3.1 CM^2
BH CV ECHO MEAS - LVOT AREA: 2.9 CM^2
BH CV ECHO MEAS - LVOT AREA: 3.3 CM^2
BH CV ECHO MEAS - LVOT DIAM: 1.9 CM
BH CV ECHO MEAS - LVOT DIAM: 2 CM
BH CV ECHO MEAS - LVPWD: 0.98 CM
BH CV ECHO MEAS - LVPWD: 1.4 CM
BH CV ECHO MEAS - LVPWS: 1.2 CM
BH CV ECHO MEAS - LVPWS: 1.8 CM
BH CV ECHO MEAS - MV A MAX VEL: 29.5 CM/SEC
BH CV ECHO MEAS - MV DEC SLOPE: 439.1 CM/SEC^2
BH CV ECHO MEAS - MV DEC SLOPE: 914.9 CM/SEC^2
BH CV ECHO MEAS - MV E MAX VEL: 141.3 CM/SEC
BH CV ECHO MEAS - MV E MAX VEL: 187.2 CM/SEC
BH CV ECHO MEAS - MV E/A: 6.3
BH CV ECHO MEAS - MV P1/2T MAX VEL: 183.2 CM/SEC
BH CV ECHO MEAS - MV P1/2T MAX VEL: 202 CM/SEC
BH CV ECHO MEAS - MV P1/2T: 122.2 MSEC
BH CV ECHO MEAS - MV P1/2T: 64.7 MSEC
BH CV ECHO MEAS - MVA P1/2T LCG: 1.1 CM^2
BH CV ECHO MEAS - MVA P1/2T LCG: 1.2 CM^2
BH CV ECHO MEAS - MVA(P1/2T): 1.8 CM^2
BH CV ECHO MEAS - MVA(P1/2T): 3.4 CM^2
BH CV ECHO MEAS - PA ACC SLOPE: 1140 CM/SEC^2
BH CV ECHO MEAS - PA ACC SLOPE: 1437 CM/SEC^2
BH CV ECHO MEAS - PA ACC TIME: 0.08 SEC
BH CV ECHO MEAS - PA ACC TIME: 0.08 SEC
BH CV ECHO MEAS - PA PR(ACCEL): 41 MMHG
BH CV ECHO MEAS - PA PR(ACCEL): 44.1 MMHG
BH CV ECHO MEAS - RAP SYSTOLE: 10 MMHG
BH CV ECHO MEAS - RAP SYSTOLE: 10 MMHG
BH CV ECHO MEAS - RVSP: 43.4 MMHG
BH CV ECHO MEAS - RVSP: 49 MMHG
BH CV ECHO MEAS - SI(AO): 153.8 ML/M^2
BH CV ECHO MEAS - SI(AO): 191.1 ML/M^2
BH CV ECHO MEAS - SI(CUBED): 16 ML/M^2
BH CV ECHO MEAS - SI(CUBED): 17.4 ML/M^2
BH CV ECHO MEAS - SI(LVOT): 22.3 ML/M^2
BH CV ECHO MEAS - SI(LVOT): 24.6 ML/M^2
BH CV ECHO MEAS - SI(MOD-SP4): 10.5 ML/M^2
BH CV ECHO MEAS - SI(MOD-SP4): 14.6 ML/M^2
BH CV ECHO MEAS - SI(TEICH): 13.2 ML/M^2
BH CV ECHO MEAS - SI(TEICH): 15.3 ML/M^2
BH CV ECHO MEAS - SV(AO): 274.6 ML
BH CV ECHO MEAS - SV(AO): 344.8 ML
BH CV ECHO MEAS - SV(CUBED): 28.6 ML
BH CV ECHO MEAS - SV(CUBED): 31.3 ML
BH CV ECHO MEAS - SV(LVOT): 40.2 ML
BH CV ECHO MEAS - SV(LVOT): 44 ML
BH CV ECHO MEAS - SV(MOD-SP4): 19 ML
BH CV ECHO MEAS - SV(MOD-SP4): 26 ML
BH CV ECHO MEAS - SV(TEICH): 23.5 ML
BH CV ECHO MEAS - SV(TEICH): 27.7 ML
BH CV ECHO MEAS - TR MAX VEL: 288.8 CM/SEC
BH CV ECHO MEAS - TR MAX VEL: 312.2 CM/SEC
BILIRUB SERPL-MCNC: 0.7 MG/DL (ref 0.2–1.2)
BILIRUB SERPL-MCNC: 0.8 MG/DL (ref 0.2–1.2)
BILIRUB SERPL-MCNC: 0.9 MG/DL (ref 0.2–1.2)
BILIRUB SERPL-MCNC: 0.9 MG/DL (ref 0.2–1.2)
BILIRUB SERPL-MCNC: 1 MG/DL (ref 0.2–1.2)
BILIRUB SERPL-MCNC: 1.1 MG/DL (ref 0.2–1.2)
BILIRUB SERPL-MCNC: 1.2 MG/DL (ref 0.2–1.2)
BILIRUB SERPL-MCNC: 1.3 MG/DL (ref 0.2–1.2)
BILIRUB UR QL STRIP: NEGATIVE
BODY TEMPERATURE: 0 C
BUN BLD-MCNC: 27 MG/DL (ref 8–23)
BUN BLD-MCNC: 35 MG/DL (ref 8–23)
BUN BLD-MCNC: 36 MG/DL (ref 8–23)
BUN BLD-MCNC: 38 MG/DL (ref 8–23)
BUN BLD-MCNC: 42 MG/DL (ref 8–23)
BUN BLD-MCNC: 46 MG/DL (ref 8–23)
BUN BLD-MCNC: 48 MG/DL (ref 8–23)
BUN BLD-MCNC: 48 MG/DL (ref 8–23)
BUN BLD-MCNC: 52 MG/DL (ref 8–23)
BUN BLD-MCNC: 55 MG/DL (ref 8–23)
BUN BLD-MCNC: 63 MG/DL (ref 8–23)
BUN/CREAT SERPL: 20.6 (ref 7–25)
BUN/CREAT SERPL: 23.6 (ref 7–25)
BUN/CREAT SERPL: 24.4 (ref 7–25)
BUN/CREAT SERPL: 24.8 (ref 7–25)
BUN/CREAT SERPL: 26.7 (ref 7–25)
BUN/CREAT SERPL: 27.2 (ref 7–25)
BUN/CREAT SERPL: 27.3 (ref 7–25)
BUN/CREAT SERPL: 28.7 (ref 7–25)
BUN/CREAT SERPL: 32.4 (ref 7–25)
BUN/CREAT SERPL: 32.9 (ref 7–25)
BUN/CREAT SERPL: 33.7 (ref 7–25)
CALCIUM SPEC-SCNC: 10 MG/DL (ref 8.6–10.5)
CALCIUM SPEC-SCNC: 8.8 MG/DL (ref 8.6–10.5)
CALCIUM SPEC-SCNC: 8.8 MG/DL (ref 8.6–10.5)
CALCIUM SPEC-SCNC: 8.9 MG/DL (ref 8.6–10.5)
CALCIUM SPEC-SCNC: 8.9 MG/DL (ref 8.6–10.5)
CALCIUM SPEC-SCNC: 9 MG/DL (ref 8.6–10.5)
CALCIUM SPEC-SCNC: 9.1 MG/DL (ref 8.6–10.5)
CALCIUM SPEC-SCNC: 9.3 MG/DL (ref 8.6–10.5)
CALCIUM SPEC-SCNC: 9.3 MG/DL (ref 8.6–10.5)
CALCIUM SPEC-SCNC: 9.4 MG/DL (ref 8.6–10.5)
CALCIUM SPEC-SCNC: 9.4 MG/DL (ref 8.6–10.5)
CHLORIDE SERPL-SCNC: 102 MMOL/L (ref 98–107)
CHLORIDE SERPL-SCNC: 103 MMOL/L (ref 98–107)
CHLORIDE SERPL-SCNC: 104 MMOL/L (ref 98–107)
CHLORIDE SERPL-SCNC: 105 MMOL/L (ref 98–107)
CHLORIDE SERPL-SCNC: 107 MMOL/L (ref 98–107)
CHLORIDE SERPL-SCNC: 107 MMOL/L (ref 98–107)
CHLORIDE SERPL-SCNC: 108 MMOL/L (ref 98–107)
CHLORIDE SERPL-SCNC: 109 MMOL/L (ref 98–107)
CHOLEST SERPL-MCNC: 135 MG/DL (ref 0–200)
CHOLEST SERPL-MCNC: 73 MG/DL (ref 0–200)
CLARITY UR: CLEAR
CO2 BLDA-SCNC: 18.1 MMOL/L (ref 22–33)
CO2 SERPL-SCNC: 17.2 MMOL/L (ref 22–29)
CO2 SERPL-SCNC: 18 MMOL/L (ref 22–29)
CO2 SERPL-SCNC: 19.5 MMOL/L (ref 22–29)
CO2 SERPL-SCNC: 20.1 MMOL/L (ref 22–29)
CO2 SERPL-SCNC: 20.9 MMOL/L (ref 22–29)
CO2 SERPL-SCNC: 21 MMOL/L (ref 22–29)
CO2 SERPL-SCNC: 23.4 MMOL/L (ref 22–29)
CO2 SERPL-SCNC: 23.6 MMOL/L (ref 22–29)
CO2 SERPL-SCNC: 23.9 MMOL/L (ref 22–29)
CO2 SERPL-SCNC: 24.7 MMOL/L (ref 22–29)
CO2 SERPL-SCNC: 29.2 MMOL/L (ref 22–29)
COHGB MFR BLD: 0.6 % (ref 0–5)
COLOR UR: YELLOW
CREAT BLD-MCNC: 1.31 MG/DL (ref 0.76–1.27)
CREAT BLD-MCNC: 1.45 MG/DL (ref 0.76–1.27)
CREAT BLD-MCNC: 1.48 MG/DL (ref 0.76–1.27)
CREAT BLD-MCNC: 1.54 MG/DL (ref 0.76–1.27)
CREAT BLD-MCNC: 1.56 MG/DL (ref 0.76–1.27)
CREAT BLD-MCNC: 1.58 MG/DL (ref 0.76–1.27)
CREAT BLD-MCNC: 1.67 MG/DL (ref 0.76–1.27)
CREAT BLD-MCNC: 1.69 MG/DL (ref 0.76–1.27)
CREAT BLD-MCNC: 1.7 MG/DL (ref 0.76–1.27)
CREAT BLD-MCNC: 1.8 MG/DL (ref 0.76–1.27)
CREAT BLD-MCNC: 1.87 MG/DL (ref 0.76–1.27)
CRP SERPL-MCNC: 0.82 MG/DL (ref 0–0.5)
CRP SERPL-MCNC: 0.92 MG/DL (ref 0–0.5)
D-LACTATE SERPL-SCNC: 1.7 MMOL/L (ref 0.5–2)
D-LACTATE SERPL-SCNC: 2 MMOL/L (ref 0.5–2)
DEPRECATED RDW RBC AUTO: 43.6 FL (ref 37–54)
DEPRECATED RDW RBC AUTO: 47.4 FL (ref 37–54)
DEPRECATED RDW RBC AUTO: 47.8 FL (ref 37–54)
DEPRECATED RDW RBC AUTO: 48.6 FL (ref 37–54)
DEPRECATED RDW RBC AUTO: 49.7 FL (ref 37–54)
DEPRECATED RDW RBC AUTO: 49.9 FL (ref 37–54)
DEPRECATED RDW RBC AUTO: 50.4 FL (ref 37–54)
EOSINOPHIL # BLD AUTO: 0.05 10*3/MM3 (ref 0–0.4)
EOSINOPHIL # BLD AUTO: 0.12 10*3/MM3 (ref 0–0.4)
EOSINOPHIL # BLD AUTO: 0.14 10*3/MM3 (ref 0–0.4)
EOSINOPHIL # BLD AUTO: 0.14 10*3/MM3 (ref 0–0.4)
EOSINOPHIL # BLD AUTO: 0.25 10*3/MM3 (ref 0–0.4)
EOSINOPHIL # BLD AUTO: 0.43 10*3/MM3 (ref 0–0.4)
EOSINOPHIL NFR BLD AUTO: 0.5 % (ref 0.3–6.2)
EOSINOPHIL NFR BLD AUTO: 1.1 % (ref 0.3–6.2)
EOSINOPHIL NFR BLD AUTO: 1.2 % (ref 0.3–6.2)
EOSINOPHIL NFR BLD AUTO: 1.7 % (ref 0.3–6.2)
EOSINOPHIL NFR BLD AUTO: 2 % (ref 0.3–6.2)
EOSINOPHIL NFR BLD AUTO: 5.3 % (ref 0.3–6.2)
ERYTHROCYTE [DISTWIDTH] IN BLOOD BY AUTOMATED COUNT: 12.7 % (ref 12.3–15.4)
ERYTHROCYTE [DISTWIDTH] IN BLOOD BY AUTOMATED COUNT: 13 % (ref 12.3–15.4)
ERYTHROCYTE [DISTWIDTH] IN BLOOD BY AUTOMATED COUNT: 13.7 % (ref 12.3–15.4)
ERYTHROCYTE [DISTWIDTH] IN BLOOD BY AUTOMATED COUNT: 14.1 % (ref 12.3–15.4)
ERYTHROCYTE [DISTWIDTH] IN BLOOD BY AUTOMATED COUNT: 14.2 % (ref 12.3–15.4)
FLUAV AG NPH QL: NEGATIVE
FLUBV AG NPH QL IA: NEGATIVE
FOLATE SERPL-MCNC: >20 NG/ML (ref 4.78–24.2)
GFR SERPL CREATININE-BSD FRML MDRD: 34 ML/MIN/1.73
GFR SERPL CREATININE-BSD FRML MDRD: 36 ML/MIN/1.73
GFR SERPL CREATININE-BSD FRML MDRD: 38 ML/MIN/1.73
GFR SERPL CREATININE-BSD FRML MDRD: 38 ML/MIN/1.73
GFR SERPL CREATININE-BSD FRML MDRD: 39 ML/MIN/1.73
GFR SERPL CREATININE-BSD FRML MDRD: 42 ML/MIN/1.73
GFR SERPL CREATININE-BSD FRML MDRD: 42 ML/MIN/1.73
GFR SERPL CREATININE-BSD FRML MDRD: 43 ML/MIN/1.73
GFR SERPL CREATININE-BSD FRML MDRD: 45 ML/MIN/1.73
GFR SERPL CREATININE-BSD FRML MDRD: 46 ML/MIN/1.73
GFR SERPL CREATININE-BSD FRML MDRD: 52 ML/MIN/1.73
GLOBULIN UR ELPH-MCNC: 2.2 GM/DL
GLOBULIN UR ELPH-MCNC: 2.4 GM/DL
GLOBULIN UR ELPH-MCNC: 2.6 GM/DL
GLOBULIN UR ELPH-MCNC: 2.8 GM/DL
GLOBULIN UR ELPH-MCNC: 3.4 GM/DL
GLUCOSE BLD-MCNC: 103 MG/DL (ref 65–99)
GLUCOSE BLD-MCNC: 107 MG/DL (ref 65–99)
GLUCOSE BLD-MCNC: 120 MG/DL (ref 65–99)
GLUCOSE BLD-MCNC: 121 MG/DL (ref 65–99)
GLUCOSE BLD-MCNC: 145 MG/DL (ref 65–99)
GLUCOSE BLD-MCNC: 84 MG/DL (ref 65–99)
GLUCOSE BLD-MCNC: 84 MG/DL (ref 65–99)
GLUCOSE BLD-MCNC: 85 MG/DL (ref 65–99)
GLUCOSE BLD-MCNC: 91 MG/DL (ref 65–99)
GLUCOSE UR STRIP-MCNC: NEGATIVE MG/DL
HAV IGM SERPL QL IA: NORMAL
HBA1C MFR BLD: 6.1 % (ref 4.8–5.6)
HBV CORE IGM SERPL QL IA: NORMAL
HBV SURFACE AG SERPL QL IA: NORMAL
HCO3 BLDA-SCNC: 17.3 MMOL/L (ref 20–26)
HCT VFR BLD AUTO: 40.6 % (ref 37.5–51)
HCT VFR BLD AUTO: 41.8 % (ref 37.5–51)
HCT VFR BLD AUTO: 42.1 % (ref 37.5–51)
HCT VFR BLD AUTO: 43.1 % (ref 37.5–51)
HCT VFR BLD AUTO: 46.6 % (ref 37.5–51)
HCT VFR BLD AUTO: 46.6 % (ref 37.5–51)
HCT VFR BLD AUTO: 53.5 % (ref 37.5–51)
HCT VFR BLD CALC: 43.9 % (ref 38–51)
HCV AB SER DONR QL: NORMAL
HDLC SERPL-MCNC: 31 MG/DL (ref 40–60)
HDLC SERPL-MCNC: 40 MG/DL (ref 40–60)
HGB BLD-MCNC: 13.5 G/DL (ref 13–17.7)
HGB BLD-MCNC: 14.1 G/DL (ref 13–17.7)
HGB BLD-MCNC: 14.7 G/DL (ref 13–17.7)
HGB BLD-MCNC: 15 G/DL (ref 13–17.7)
HGB BLD-MCNC: 16.7 G/DL (ref 13–17.7)
HGB BLDA-MCNC: 14.3 G/DL (ref 14–18)
HGB UR QL STRIP.AUTO: NEGATIVE
HOLD SPECIMEN: NORMAL
HOROWITZ INDEX BLD+IHG-RTO: 21 %
IMM GRANULOCYTES # BLD AUTO: 0.03 10*3/MM3 (ref 0–0.05)
IMM GRANULOCYTES # BLD AUTO: 0.04 10*3/MM3 (ref 0–0.05)
IMM GRANULOCYTES # BLD AUTO: 0.05 10*3/MM3 (ref 0–0.05)
IMM GRANULOCYTES # BLD AUTO: 0.06 10*3/MM3 (ref 0–0.05)
IMM GRANULOCYTES # BLD AUTO: 0.07 10*3/MM3 (ref 0–0.05)
IMM GRANULOCYTES # BLD AUTO: 0.09 10*3/MM3 (ref 0–0.05)
IMM GRANULOCYTES NFR BLD AUTO: 0.4 % (ref 0–0.5)
IMM GRANULOCYTES NFR BLD AUTO: 0.4 % (ref 0–0.5)
IMM GRANULOCYTES NFR BLD AUTO: 0.5 % (ref 0–0.5)
IMM GRANULOCYTES NFR BLD AUTO: 0.6 % (ref 0–0.5)
IMM GRANULOCYTES NFR BLD AUTO: 0.7 % (ref 0–0.5)
IMM GRANULOCYTES NFR BLD AUTO: 0.8 % (ref 0–0.5)
INR PPP: 1.37 (ref 0.9–1.1)
INR PPP: 1.89 (ref 0.9–1.1)
KETONES UR QL STRIP: NEGATIVE
LDLC SERPL CALC-MCNC: 32 MG/DL (ref 0–100)
LDLC SERPL CALC-MCNC: 83 MG/DL (ref 0–100)
LDLC/HDLC SERPL: 1.03 {RATIO}
LDLC/HDLC SERPL: 2.07 {RATIO}
LEUKOCYTE ESTERASE UR QL STRIP.AUTO: NEGATIVE
LV EF 2D ECHO EST: 35 %
LYMPHOCYTES # BLD AUTO: 1 10*3/MM3 (ref 0.7–3.1)
LYMPHOCYTES # BLD AUTO: 1.1 10*3/MM3 (ref 0.7–3.1)
LYMPHOCYTES # BLD AUTO: 1.19 10*3/MM3 (ref 0.7–3.1)
LYMPHOCYTES # BLD AUTO: 1.39 10*3/MM3 (ref 0.7–3.1)
LYMPHOCYTES # BLD AUTO: 1.47 10*3/MM3 (ref 0.7–3.1)
LYMPHOCYTES # BLD AUTO: 1.59 10*3/MM3 (ref 0.7–3.1)
LYMPHOCYTES NFR BLD AUTO: 10.1 % (ref 19.6–45.3)
LYMPHOCYTES NFR BLD AUTO: 11.6 % (ref 19.6–45.3)
LYMPHOCYTES NFR BLD AUTO: 13.5 % (ref 19.6–45.3)
LYMPHOCYTES NFR BLD AUTO: 15.4 % (ref 19.6–45.3)
LYMPHOCYTES NFR BLD AUTO: 17.2 % (ref 19.6–45.3)
LYMPHOCYTES NFR BLD AUTO: 9.3 % (ref 19.6–45.3)
Lab: ABNORMAL
MAGNESIUM SERPL-MCNC: 2.2 MG/DL (ref 1.6–2.4)
MAGNESIUM SERPL-MCNC: 2.6 MG/DL (ref 1.6–2.4)
MAXIMAL PREDICTED HEART RATE: 131 BPM
MAXIMAL PREDICTED HEART RATE: 131 BPM
MCH RBC QN AUTO: 30.4 PG (ref 26.6–33)
MCH RBC QN AUTO: 30.5 PG (ref 26.6–33)
MCH RBC QN AUTO: 30.7 PG (ref 26.6–33)
MCH RBC QN AUTO: 30.8 PG (ref 26.6–33)
MCH RBC QN AUTO: 30.8 PG (ref 26.6–33)
MCH RBC QN AUTO: 31 PG (ref 26.6–33)
MCH RBC QN AUTO: 31.6 PG (ref 26.6–33)
MCHC RBC AUTO-ENTMCNC: 31.2 G/DL (ref 31.5–35.7)
MCHC RBC AUTO-ENTMCNC: 31.3 G/DL (ref 31.5–35.7)
MCHC RBC AUTO-ENTMCNC: 31.5 G/DL (ref 31.5–35.7)
MCHC RBC AUTO-ENTMCNC: 32.1 G/DL (ref 31.5–35.7)
MCHC RBC AUTO-ENTMCNC: 32.2 G/DL (ref 31.5–35.7)
MCHC RBC AUTO-ENTMCNC: 33.3 G/DL (ref 31.5–35.7)
MCHC RBC AUTO-ENTMCNC: 33.7 G/DL (ref 31.5–35.7)
MCV RBC AUTO: 92.5 FL (ref 79–97)
MCV RBC AUTO: 93.7 FL (ref 79–97)
MCV RBC AUTO: 94.7 FL (ref 79–97)
MCV RBC AUTO: 96.1 FL (ref 79–97)
MCV RBC AUTO: 97.1 FL (ref 79–97)
MCV RBC AUTO: 98.3 FL (ref 79–97)
MCV RBC AUTO: 98.3 FL (ref 79–97)
METHGB BLD QL: 0 % (ref 0–3)
MODALITY: ABNORMAL
MONOCYTES # BLD AUTO: 0.82 10*3/MM3 (ref 0.1–0.9)
MONOCYTES # BLD AUTO: 0.93 10*3/MM3 (ref 0.1–0.9)
MONOCYTES # BLD AUTO: 1.02 10*3/MM3 (ref 0.1–0.9)
MONOCYTES # BLD AUTO: 1.04 10*3/MM3 (ref 0.1–0.9)
MONOCYTES # BLD AUTO: 1.41 10*3/MM3 (ref 0.1–0.9)
MONOCYTES # BLD AUTO: 1.47 10*3/MM3 (ref 0.1–0.9)
MONOCYTES NFR BLD AUTO: 10.1 % (ref 5–12)
MONOCYTES NFR BLD AUTO: 10.2 % (ref 5–12)
MONOCYTES NFR BLD AUTO: 11.4 % (ref 5–12)
MONOCYTES NFR BLD AUTO: 11.6 % (ref 5–12)
MONOCYTES NFR BLD AUTO: 12 % (ref 5–12)
MONOCYTES NFR BLD AUTO: 9.4 % (ref 5–12)
NEUTROPHILS # BLD AUTO: 5.29 10*3/MM3 (ref 1.7–7)
NEUTROPHILS # BLD AUTO: 5.85 10*3/MM3 (ref 1.7–7)
NEUTROPHILS # BLD AUTO: 7.46 10*3/MM3 (ref 1.7–7)
NEUTROPHILS # BLD AUTO: 8.49 10*3/MM3 (ref 1.7–7)
NEUTROPHILS # BLD AUTO: 8.83 10*3/MM3 (ref 1.7–7)
NEUTROPHILS # BLD AUTO: 9.36 10*3/MM3 (ref 1.7–7)
NEUTROPHILS NFR BLD AUTO: 65.5 % (ref 42.7–76)
NEUTROPHILS NFR BLD AUTO: 71.8 % (ref 42.7–76)
NEUTROPHILS NFR BLD AUTO: 72.2 % (ref 42.7–76)
NEUTROPHILS NFR BLD AUTO: 73.5 % (ref 42.7–76)
NEUTROPHILS NFR BLD AUTO: 75.3 % (ref 42.7–76)
NEUTROPHILS NFR BLD AUTO: 78.5 % (ref 42.7–76)
NITRITE UR QL STRIP: NEGATIVE
NOTE: ABNORMAL
NRBC BLD AUTO-RTO: 0 /100 WBC (ref 0–0.2)
NRBC BLD AUTO-RTO: 0.1 /100 WBC (ref 0–0.2)
NT-PROBNP SERPL-MCNC: 3438 PG/ML (ref 5–1800)
NT-PROBNP SERPL-MCNC: 5350 PG/ML (ref 5–1800)
NT-PROBNP SERPL-MCNC: 5447 PG/ML (ref 5–1800)
NT-PROBNP SERPL-MCNC: 9847 PG/ML (ref 5–1800)
OXYHGB MFR BLDV: 97.9 % (ref 94–99)
PCO2 BLDA: 26.3 MM HG (ref 35–45)
PCO2 TEMP ADJ BLD: ABNORMAL MM[HG]
PH BLDA: 7.43 PH UNITS (ref 7.35–7.45)
PH UR STRIP.AUTO: 5.5 [PH] (ref 5–8)
PH, TEMP CORRECTED: ABNORMAL
PLATELET # BLD AUTO: 142 10*3/MM3 (ref 140–450)
PLATELET # BLD AUTO: 143 10*3/MM3 (ref 140–450)
PLATELET # BLD AUTO: 155 10*3/MM3 (ref 140–450)
PLATELET # BLD AUTO: 158 10*3/MM3 (ref 140–450)
PLATELET # BLD AUTO: 163 10*3/MM3 (ref 140–450)
PLATELET # BLD AUTO: 169 10*3/MM3 (ref 140–450)
PLATELET # BLD AUTO: 223 10*3/MM3 (ref 140–450)
PMV BLD AUTO: 11.2 FL (ref 6–12)
PMV BLD AUTO: 11.6 FL (ref 6–12)
PMV BLD AUTO: 12 FL (ref 6–12)
PMV BLD AUTO: 12.2 FL (ref 6–12)
PMV BLD AUTO: 12.4 FL (ref 6–12)
PO2 BLDA: 110 MM HG (ref 83–108)
PO2 TEMP ADJ BLD: ABNORMAL MM[HG]
POTASSIUM BLD-SCNC: 3.8 MMOL/L (ref 3.5–5.2)
POTASSIUM BLD-SCNC: 4 MMOL/L (ref 3.5–5.2)
POTASSIUM BLD-SCNC: 4.1 MMOL/L (ref 3.5–5.2)
POTASSIUM BLD-SCNC: 4.4 MMOL/L (ref 3.5–5.2)
POTASSIUM BLD-SCNC: 4.4 MMOL/L (ref 3.5–5.2)
POTASSIUM BLD-SCNC: 4.5 MMOL/L (ref 3.5–5.2)
POTASSIUM BLD-SCNC: 4.7 MMOL/L (ref 3.5–5.2)
POTASSIUM BLD-SCNC: 4.9 MMOL/L (ref 3.5–5.2)
POTASSIUM BLD-SCNC: 5.3 MMOL/L (ref 3.5–5.2)
POTASSIUM BLD-SCNC: 5.4 MMOL/L (ref 3.5–5.2)
POTASSIUM BLD-SCNC: 5.7 MMOL/L (ref 3.5–5.2)
POTASSIUM BLD-SCNC: 5.8 MMOL/L (ref 3.5–5.2)
PROT SERPL-MCNC: 5.6 G/DL (ref 6–8.5)
PROT SERPL-MCNC: 5.9 G/DL (ref 6–8.5)
PROT SERPL-MCNC: 6 G/DL (ref 6–8.5)
PROT SERPL-MCNC: 6.1 G/DL (ref 6–8.5)
PROT SERPL-MCNC: 6.3 G/DL (ref 6–8.5)
PROT SERPL-MCNC: 6.7 G/DL (ref 6–8.5)
PROT SERPL-MCNC: 6.7 G/DL (ref 6–8.5)
PROT SERPL-MCNC: 7.9 G/DL (ref 6–8.5)
PROT UR QL STRIP: NEGATIVE
PROTHROMBIN TIME: 17.6 SECONDS (ref 11–15.4)
PROTHROMBIN TIME: 22.6 SECONDS (ref 11–15.4)
RBC # BLD AUTO: 4.38 10*6/MM3 (ref 4.14–5.8)
RBC # BLD AUTO: 4.39 10*6/MM3 (ref 4.14–5.8)
RBC # BLD AUTO: 4.44 10*6/MM3 (ref 4.14–5.8)
RBC # BLD AUTO: 4.46 10*6/MM3 (ref 4.14–5.8)
RBC # BLD AUTO: 4.74 10*6/MM3 (ref 4.14–5.8)
RBC # BLD AUTO: 4.92 10*6/MM3 (ref 4.14–5.8)
RBC # BLD AUTO: 5.44 10*6/MM3 (ref 4.14–5.8)
SAO2 % BLDCOA: 98.5 % (ref 94–99)
SODIUM BLD-SCNC: 138 MMOL/L (ref 136–145)
SODIUM BLD-SCNC: 139 MMOL/L (ref 136–145)
SODIUM BLD-SCNC: 140 MMOL/L (ref 136–145)
SODIUM BLD-SCNC: 140 MMOL/L (ref 136–145)
SODIUM BLD-SCNC: 141 MMOL/L (ref 136–145)
SODIUM BLD-SCNC: 143 MMOL/L (ref 136–145)
SODIUM BLD-SCNC: 144 MMOL/L (ref 136–145)
SP GR UR STRIP: 1.01 (ref 1–1.03)
STRESS TARGET HR: 111 BPM
STRESS TARGET HR: 111 BPM
T4 FREE SERPL-MCNC: 1.23 NG/DL (ref 0.93–1.7)
TRIGL SERPL-MCNC: 50 MG/DL (ref 0–150)
TRIGL SERPL-MCNC: 62 MG/DL (ref 0–150)
TROPONIN T SERPL-MCNC: 0.01 NG/ML (ref 0–0.03)
TROPONIN T SERPL-MCNC: <0.01 NG/ML (ref 0–0.03)
TSH SERPL DL<=0.05 MIU/L-ACNC: 4.23 MIU/ML (ref 0.27–4.2)
TSH SERPL DL<=0.05 MIU/L-ACNC: 6.36 MIU/ML (ref 0.27–4.2)
TSH SERPL DL<=0.05 MIU/L-ACNC: 6.79 UIU/ML (ref 0.27–4.2)
TSH SERPL DL<=0.05 MIU/L-ACNC: 7.95 MIU/ML (ref 0.27–4.2)
TSH SERPL DL<=0.05 MIU/L-ACNC: 9.65 UIU/ML (ref 0.27–4.2)
UROBILINOGEN UR QL STRIP: NORMAL
VENTILATOR MODE: ABNORMAL
VIT B12 BLD-MCNC: 1855 PG/ML (ref 211–946)
VLDLC SERPL-MCNC: 10 MG/DL
VLDLC SERPL-MCNC: 12.4 MG/DL (ref 5–40)
WBC NRBC COR # BLD: 10.1 10*3/MM3 (ref 3.4–10.8)
WBC NRBC COR # BLD: 10.32 10*3/MM3 (ref 3.4–10.8)
WBC NRBC COR # BLD: 10.81 10*3/MM3 (ref 3.4–10.8)
WBC NRBC COR # BLD: 11.73 10*3/MM3 (ref 3.4–10.8)
WBC NRBC COR # BLD: 12.72 10*3/MM3 (ref 3.4–10.8)
WBC NRBC COR # BLD: 8.07 10*3/MM3 (ref 3.4–10.8)
WBC NRBC COR # BLD: 8.15 10*3/MM3 (ref 3.4–10.8)
WHOLE BLOOD HOLD SPECIMEN: NORMAL

## 2019-01-01 PROCEDURE — 85025 COMPLETE CBC W/AUTO DIFF WBC: CPT | Performed by: NURSE PRACTITIONER

## 2019-01-01 PROCEDURE — 84443 ASSAY THYROID STIM HORMONE: CPT | Performed by: NURSE PRACTITIONER

## 2019-01-01 PROCEDURE — 93000 ELECTROCARDIOGRAM COMPLETE: CPT | Performed by: INTERNAL MEDICINE

## 2019-01-01 PROCEDURE — 99214 OFFICE O/P EST MOD 30 MIN: CPT | Performed by: PHYSICIAN ASSISTANT

## 2019-01-01 PROCEDURE — 99232 SBSQ HOSP IP/OBS MODERATE 35: CPT | Performed by: INTERNAL MEDICINE

## 2019-01-01 PROCEDURE — 85610 PROTHROMBIN TIME: CPT | Performed by: FAMILY MEDICINE

## 2019-01-01 PROCEDURE — 87040 BLOOD CULTURE FOR BACTERIA: CPT | Performed by: FAMILY MEDICINE

## 2019-01-01 PROCEDURE — 71046 X-RAY EXAM CHEST 2 VIEWS: CPT | Performed by: RADIOLOGY

## 2019-01-01 PROCEDURE — 71045 X-RAY EXAM CHEST 1 VIEW: CPT

## 2019-01-01 PROCEDURE — 99214 OFFICE O/P EST MOD 30 MIN: CPT | Performed by: INTERNAL MEDICINE

## 2019-01-01 PROCEDURE — 85027 COMPLETE CBC AUTOMATED: CPT | Performed by: NURSE PRACTITIONER

## 2019-01-01 PROCEDURE — 87804 INFLUENZA ASSAY W/OPTIC: CPT | Performed by: FAMILY MEDICINE

## 2019-01-01 PROCEDURE — 36415 COLL VENOUS BLD VENIPUNCTURE: CPT

## 2019-01-01 PROCEDURE — 99214 OFFICE O/P EST MOD 30 MIN: CPT | Performed by: FAMILY MEDICINE

## 2019-01-01 PROCEDURE — 99213 OFFICE O/P EST LOW 20 MIN: CPT | Performed by: INTERNAL MEDICINE

## 2019-01-01 PROCEDURE — 94799 UNLISTED PULMONARY SVC/PX: CPT

## 2019-01-01 PROCEDURE — 80053 COMPREHEN METABOLIC PANEL: CPT | Performed by: NURSE PRACTITIONER

## 2019-01-01 PROCEDURE — 93306 TTE W/DOPPLER COMPLETE: CPT | Performed by: INTERNAL MEDICINE

## 2019-01-01 PROCEDURE — 93306 TTE W/DOPPLER COMPLETE: CPT

## 2019-01-01 PROCEDURE — 80048 BASIC METABOLIC PNL TOTAL CA: CPT

## 2019-01-01 PROCEDURE — 84443 ASSAY THYROID STIM HORMONE: CPT | Performed by: FAMILY MEDICINE

## 2019-01-01 PROCEDURE — 25010000002 FUROSEMIDE PER 20 MG: Performed by: INTERNAL MEDICINE

## 2019-01-01 PROCEDURE — 85730 THROMBOPLASTIN TIME PARTIAL: CPT | Performed by: EMERGENCY MEDICINE

## 2019-01-01 PROCEDURE — 82009 KETONE BODYS QUAL: CPT | Performed by: PHYSICIAN ASSISTANT

## 2019-01-01 PROCEDURE — 80074 ACUTE HEPATITIS PANEL: CPT | Performed by: PHYSICIAN ASSISTANT

## 2019-01-01 PROCEDURE — 99222 1ST HOSP IP/OBS MODERATE 55: CPT | Performed by: INTERNAL MEDICINE

## 2019-01-01 PROCEDURE — 85610 PROTHROMBIN TIME: CPT | Performed by: EMERGENCY MEDICINE

## 2019-01-01 PROCEDURE — 85025 COMPLETE CBC W/AUTO DIFF WBC: CPT | Performed by: EMERGENCY MEDICINE

## 2019-01-01 PROCEDURE — 93005 ELECTROCARDIOGRAM TRACING: CPT | Performed by: NURSE PRACTITIONER

## 2019-01-01 PROCEDURE — 83880 ASSAY OF NATRIURETIC PEPTIDE: CPT | Performed by: EMERGENCY MEDICINE

## 2019-01-01 PROCEDURE — 84484 ASSAY OF TROPONIN QUANT: CPT | Performed by: NURSE PRACTITIONER

## 2019-01-01 PROCEDURE — G0008 ADMIN INFLUENZA VIRUS VAC: HCPCS | Performed by: INTERNAL MEDICINE

## 2019-01-01 PROCEDURE — 82607 VITAMIN B-12: CPT | Performed by: PHYSICIAN ASSISTANT

## 2019-01-01 PROCEDURE — 99284 EMERGENCY DEPT VISIT MOD MDM: CPT

## 2019-01-01 PROCEDURE — 85025 COMPLETE CBC W/AUTO DIFF WBC: CPT | Performed by: INTERNAL MEDICINE

## 2019-01-01 PROCEDURE — 93005 ELECTROCARDIOGRAM TRACING: CPT | Performed by: FAMILY MEDICINE

## 2019-01-01 PROCEDURE — 93010 ELECTROCARDIOGRAM REPORT: CPT | Performed by: INTERNAL MEDICINE

## 2019-01-01 PROCEDURE — 36415 COLL VENOUS BLD VENIPUNCTURE: CPT | Performed by: NURSE PRACTITIONER

## 2019-01-01 PROCEDURE — 83880 ASSAY OF NATRIURETIC PEPTIDE: CPT | Performed by: FAMILY MEDICINE

## 2019-01-01 PROCEDURE — 90674 CCIIV4 VAC NO PRSV 0.5 ML IM: CPT | Performed by: INTERNAL MEDICINE

## 2019-01-01 PROCEDURE — 83880 ASSAY OF NATRIURETIC PEPTIDE: CPT | Performed by: INTERNAL MEDICINE

## 2019-01-01 PROCEDURE — 83050 HGB METHEMOGLOBIN QUAN: CPT

## 2019-01-01 PROCEDURE — 80053 COMPREHEN METABOLIC PANEL: CPT | Performed by: PHYSICIAN ASSISTANT

## 2019-01-01 PROCEDURE — 99204 OFFICE O/P NEW MOD 45 MIN: CPT | Performed by: INTERNAL MEDICINE

## 2019-01-01 PROCEDURE — 99239 HOSP IP/OBS DSCHRG MGMT >30: CPT | Performed by: INTERNAL MEDICINE

## 2019-01-01 PROCEDURE — 83605 ASSAY OF LACTIC ACID: CPT | Performed by: FAMILY MEDICINE

## 2019-01-01 PROCEDURE — 86140 C-REACTIVE PROTEIN: CPT | Performed by: PHYSICIAN ASSISTANT

## 2019-01-01 PROCEDURE — 84439 ASSAY OF FREE THYROXINE: CPT | Performed by: EMERGENCY MEDICINE

## 2019-01-01 PROCEDURE — 83880 ASSAY OF NATRIURETIC PEPTIDE: CPT | Performed by: NURSE PRACTITIONER

## 2019-01-01 PROCEDURE — 82746 ASSAY OF FOLIC ACID SERUM: CPT | Performed by: PHYSICIAN ASSISTANT

## 2019-01-01 PROCEDURE — 80053 COMPREHEN METABOLIC PANEL: CPT | Performed by: EMERGENCY MEDICINE

## 2019-01-01 PROCEDURE — 99214 OFFICE O/P EST MOD 30 MIN: CPT | Performed by: NURSE PRACTITIONER

## 2019-01-01 PROCEDURE — 99213 OFFICE O/P EST LOW 20 MIN: CPT | Performed by: NURSE PRACTITIONER

## 2019-01-01 PROCEDURE — 80053 COMPREHEN METABOLIC PANEL: CPT | Performed by: FAMILY MEDICINE

## 2019-01-01 PROCEDURE — 80053 COMPREHEN METABOLIC PANEL: CPT | Performed by: INTERNAL MEDICINE

## 2019-01-01 PROCEDURE — 71046 X-RAY EXAM CHEST 2 VIEWS: CPT

## 2019-01-01 PROCEDURE — 83735 ASSAY OF MAGNESIUM: CPT | Performed by: EMERGENCY MEDICINE

## 2019-01-01 PROCEDURE — 84443 ASSAY THYROID STIM HORMONE: CPT | Performed by: EMERGENCY MEDICINE

## 2019-01-01 PROCEDURE — 25010000002 INFLUENZA VAC SUBUNIT QUAD 0.5 ML SUSPENSION PREFILLED SYRINGE: Performed by: INTERNAL MEDICINE

## 2019-01-01 PROCEDURE — 86140 C-REACTIVE PROTEIN: CPT | Performed by: FAMILY MEDICINE

## 2019-01-01 PROCEDURE — 71250 CT THORAX DX C-: CPT

## 2019-01-01 PROCEDURE — 93005 ELECTROCARDIOGRAM TRACING: CPT | Performed by: EMERGENCY MEDICINE

## 2019-01-01 PROCEDURE — 80048 BASIC METABOLIC PNL TOTAL CA: CPT | Performed by: FAMILY MEDICINE

## 2019-01-01 PROCEDURE — 99239 HOSP IP/OBS DSCHRG MGMT >30: CPT | Performed by: HOSPITALIST

## 2019-01-01 PROCEDURE — 36600 WITHDRAWAL OF ARTERIAL BLOOD: CPT

## 2019-01-01 PROCEDURE — 83605 ASSAY OF LACTIC ACID: CPT | Performed by: PHYSICIAN ASSISTANT

## 2019-01-01 PROCEDURE — 83036 HEMOGLOBIN GLYCOSYLATED A1C: CPT | Performed by: PHYSICIAN ASSISTANT

## 2019-01-01 PROCEDURE — 71045 X-RAY EXAM CHEST 1 VIEW: CPT | Performed by: RADIOLOGY

## 2019-01-01 PROCEDURE — 82375 ASSAY CARBOXYHB QUANT: CPT

## 2019-01-01 PROCEDURE — 93000 ELECTROCARDIOGRAM COMPLETE: CPT | Performed by: PHYSICIAN ASSISTANT

## 2019-01-01 PROCEDURE — 99223 1ST HOSP IP/OBS HIGH 75: CPT | Performed by: PHYSICIAN ASSISTANT

## 2019-01-01 PROCEDURE — 85025 COMPLETE CBC W/AUTO DIFF WBC: CPT | Performed by: FAMILY MEDICINE

## 2019-01-01 PROCEDURE — 84443 ASSAY THYROID STIM HORMONE: CPT

## 2019-01-01 PROCEDURE — 84484 ASSAY OF TROPONIN QUANT: CPT | Performed by: FAMILY MEDICINE

## 2019-01-01 PROCEDURE — 99223 1ST HOSP IP/OBS HIGH 75: CPT | Performed by: INTERNAL MEDICINE

## 2019-01-01 PROCEDURE — 25010000002 FUROSEMIDE PER 20 MG: Performed by: EMERGENCY MEDICINE

## 2019-01-01 PROCEDURE — 99232 SBSQ HOSP IP/OBS MODERATE 35: CPT | Performed by: HOSPITALIST

## 2019-01-01 PROCEDURE — 36415 COLL VENOUS BLD VENIPUNCTURE: CPT | Performed by: FAMILY MEDICINE

## 2019-01-01 PROCEDURE — 80061 LIPID PANEL: CPT | Performed by: NURSE PRACTITIONER

## 2019-01-01 PROCEDURE — 81003 URINALYSIS AUTO W/O SCOPE: CPT | Performed by: NURSE PRACTITIONER

## 2019-01-01 PROCEDURE — 84132 ASSAY OF SERUM POTASSIUM: CPT | Performed by: NURSE PRACTITIONER

## 2019-01-01 PROCEDURE — 82805 BLOOD GASES W/O2 SATURATION: CPT

## 2019-01-01 PROCEDURE — 80048 BASIC METABOLIC PNL TOTAL CA: CPT | Performed by: HOSPITALIST

## 2019-01-01 PROCEDURE — 80061 LIPID PANEL: CPT | Performed by: PHYSICIAN ASSISTANT

## 2019-01-01 PROCEDURE — 83735 ASSAY OF MAGNESIUM: CPT | Performed by: FAMILY MEDICINE

## 2019-01-01 PROCEDURE — 84484 ASSAY OF TROPONIN QUANT: CPT | Performed by: EMERGENCY MEDICINE

## 2019-01-01 RX ORDER — NITROGLYCERIN 0.4 MG/1
0.4 TABLET SUBLINGUAL
Status: DISCONTINUED | OUTPATIENT
Start: 2019-01-01 | End: 2019-01-01 | Stop reason: HOSPADM

## 2019-01-01 RX ORDER — FUROSEMIDE 20 MG/1
20 TABLET ORAL 2 TIMES DAILY
Qty: 60 TABLET | Refills: 0 | Status: SHIPPED | OUTPATIENT
Start: 2019-01-01 | End: 2019-01-01 | Stop reason: SDUPTHER

## 2019-01-01 RX ORDER — PANTOPRAZOLE SODIUM 40 MG/1
40 TABLET, DELAYED RELEASE ORAL DAILY
Qty: 30 TABLET | Refills: 3 | Status: SHIPPED | OUTPATIENT
Start: 2019-01-01 | End: 2020-01-01 | Stop reason: HOSPADM

## 2019-01-01 RX ORDER — ASPIRIN 81 MG/1
81 TABLET ORAL DAILY
Qty: 30 TABLET | Refills: 3 | Status: SHIPPED | OUTPATIENT
Start: 2019-01-01

## 2019-01-01 RX ORDER — BUMETANIDE 0.25 MG/ML
1 INJECTION INTRAMUSCULAR; INTRAVENOUS ONCE
Status: COMPLETED | OUTPATIENT
Start: 2019-01-01 | End: 2019-01-01

## 2019-01-01 RX ORDER — FUROSEMIDE 10 MG/ML
20 INJECTION INTRAMUSCULAR; INTRAVENOUS DAILY
Status: DISCONTINUED | OUTPATIENT
Start: 2019-01-01 | End: 2019-01-01 | Stop reason: HOSPADM

## 2019-01-01 RX ORDER — ATORVASTATIN CALCIUM 10 MG/1
10 TABLET, FILM COATED ORAL DAILY
Qty: 30 TABLET | Refills: 0 | Status: SHIPPED | OUTPATIENT
Start: 2019-01-01 | End: 2019-01-01 | Stop reason: SDUPTHER

## 2019-01-01 RX ORDER — METOPROLOL TARTRATE 50 MG/1
50 TABLET, FILM COATED ORAL 2 TIMES DAILY
Qty: 180 TABLET | Refills: 1 | Status: SHIPPED | OUTPATIENT
Start: 2019-01-01 | End: 2019-01-01 | Stop reason: HOSPADM

## 2019-01-01 RX ORDER — SODIUM CHLORIDE 0.9 % (FLUSH) 0.9 %
10 SYRINGE (ML) INJECTION AS NEEDED
Status: DISCONTINUED | OUTPATIENT
Start: 2019-01-01 | End: 2019-01-01 | Stop reason: HOSPADM

## 2019-01-01 RX ORDER — I-VITE, TAB 1000-60-2MG (60/BT) 300MCG-200
1 TAB ORAL DAILY
Status: CANCELLED | OUTPATIENT
Start: 2019-01-01

## 2019-01-01 RX ORDER — MULTIVITAMIN
1 TABLET ORAL DAILY
Status: ON HOLD
Start: 2019-01-01 | End: 2020-01-01 | Stop reason: SDUPTHER

## 2019-01-01 RX ORDER — FUROSEMIDE 10 MG/ML
20 INJECTION INTRAMUSCULAR; INTRAVENOUS EVERY 12 HOURS SCHEDULED
Status: DISCONTINUED | OUTPATIENT
Start: 2019-01-01 | End: 2019-01-01

## 2019-01-01 RX ORDER — SODIUM CHLORIDE 0.9 % (FLUSH) 0.9 %
10 SYRINGE (ML) INJECTION EVERY 12 HOURS SCHEDULED
Status: DISCONTINUED | OUTPATIENT
Start: 2019-01-01 | End: 2019-01-01 | Stop reason: HOSPADM

## 2019-01-01 RX ORDER — METOPROLOL SUCCINATE 200 MG/1
200 TABLET, EXTENDED RELEASE ORAL
Qty: 30 TABLET | Refills: 0 | Status: CANCELLED | OUTPATIENT
Start: 2019-01-01

## 2019-01-01 RX ORDER — METOPROLOL TARTRATE 100 MG/1
100 TABLET ORAL 2 TIMES DAILY
Qty: 180 TABLET | Refills: 3 | Status: SHIPPED | OUTPATIENT
Start: 2019-01-01 | End: 2019-01-01

## 2019-01-01 RX ORDER — FUROSEMIDE 10 MG/ML
20 INJECTION INTRAMUSCULAR; INTRAVENOUS ONCE
Status: COMPLETED | OUTPATIENT
Start: 2019-01-01 | End: 2019-01-01

## 2019-01-01 RX ORDER — METOPROLOL SUCCINATE 50 MG/1
25 TABLET, EXTENDED RELEASE ORAL DAILY
Qty: 90 TABLET | Refills: 0 | Status: SHIPPED | OUTPATIENT
Start: 2019-01-01 | End: 2019-01-01 | Stop reason: SDUPTHER

## 2019-01-01 RX ORDER — ASPIRIN 325 MG
325 TABLET ORAL ONCE
Status: DISCONTINUED | OUTPATIENT
Start: 2019-01-01 | End: 2019-01-01 | Stop reason: HOSPADM

## 2019-01-01 RX ORDER — METOPROLOL TARTRATE 100 MG/1
100 TABLET ORAL 2 TIMES DAILY
Qty: 180 TABLET | Refills: 3 | Status: SHIPPED | OUTPATIENT
Start: 2019-01-01 | End: 2020-01-01

## 2019-01-01 RX ORDER — ATORVASTATIN CALCIUM 10 MG/1
10 TABLET, FILM COATED ORAL NIGHTLY
Status: DISCONTINUED | OUTPATIENT
Start: 2019-01-01 | End: 2019-01-01 | Stop reason: HOSPADM

## 2019-01-01 RX ORDER — PANTOPRAZOLE SODIUM 40 MG/1
40 TABLET, DELAYED RELEASE ORAL
Status: DISCONTINUED | OUTPATIENT
Start: 2019-01-01 | End: 2019-01-01 | Stop reason: HOSPADM

## 2019-01-01 RX ORDER — DILTIAZEM HCL/D5W 125 MG/125
5-15 PLASTIC BAG, INJECTION (ML) INTRAVENOUS
Status: DISCONTINUED | OUTPATIENT
Start: 2019-01-01 | End: 2019-01-01 | Stop reason: HOSPADM

## 2019-01-01 RX ORDER — METOPROLOL TARTRATE 100 MG/1
100 TABLET ORAL 2 TIMES DAILY
Qty: 180 TABLET | Refills: 3 | Status: SHIPPED | OUTPATIENT
Start: 2019-01-01 | End: 2019-01-01 | Stop reason: SDUPTHER

## 2019-01-01 RX ORDER — UREA 10 %
1 LOTION (ML) TOPICAL DAILY
Status: DISCONTINUED | OUTPATIENT
Start: 2019-01-01 | End: 2019-01-01 | Stop reason: HOSPADM

## 2019-01-01 RX ORDER — ASPIRIN 81 MG/1
81 TABLET ORAL DAILY
Status: DISCONTINUED | OUTPATIENT
Start: 2019-01-01 | End: 2019-01-01

## 2019-01-01 RX ORDER — METOPROLOL TARTRATE 50 MG/1
50 TABLET, FILM COATED ORAL 2 TIMES DAILY
Status: CANCELLED | OUTPATIENT
Start: 2019-01-01

## 2019-01-01 RX ORDER — LEVOTHYROXINE SODIUM 0.05 MG/1
50 TABLET ORAL DAILY
Qty: 30 TABLET | Refills: 1 | Status: SHIPPED | OUTPATIENT
Start: 2019-01-01 | End: 2019-01-01

## 2019-01-01 RX ORDER — METOPROLOL SUCCINATE 50 MG/1
200 TABLET, EXTENDED RELEASE ORAL
Status: DISCONTINUED | OUTPATIENT
Start: 2019-01-01 | End: 2019-01-01 | Stop reason: HOSPADM

## 2019-01-01 RX ORDER — FUROSEMIDE 20 MG/1
20 TABLET ORAL DAILY PRN
Status: DISCONTINUED | OUTPATIENT
Start: 2019-01-01 | End: 2019-01-01

## 2019-01-01 RX ORDER — NITROGLYCERIN 0.4 MG/1
0.4 TABLET SUBLINGUAL
Qty: 30 TABLET | Refills: 12 | Status: SHIPPED | OUTPATIENT
Start: 2019-01-01 | End: 2020-01-01 | Stop reason: HOSPADM

## 2019-01-01 RX ORDER — ATORVASTATIN CALCIUM 10 MG/1
10 TABLET, FILM COATED ORAL NIGHTLY
Start: 2019-01-01

## 2019-01-01 RX ORDER — FUROSEMIDE 20 MG/1
20 TABLET ORAL DAILY
Qty: 90 TABLET | Refills: 3 | Status: SHIPPED | OUTPATIENT
Start: 2019-01-01 | End: 2019-01-01

## 2019-01-01 RX ORDER — DIGOXIN 125 MCG
125 TABLET ORAL EVERY OTHER DAY
Qty: 90 TABLET | Refills: 0 | Status: SHIPPED | OUTPATIENT
Start: 2019-01-01 | End: 2019-01-01

## 2019-01-01 RX ORDER — METOPROLOL SUCCINATE 50 MG/1
150 TABLET, EXTENDED RELEASE ORAL
Status: DISCONTINUED | OUTPATIENT
Start: 2019-01-01 | End: 2019-01-01

## 2019-01-01 RX ORDER — FUROSEMIDE 20 MG/1
TABLET ORAL
Qty: 30 TABLET | Refills: 0 | Status: SHIPPED | OUTPATIENT
Start: 2019-01-01 | End: 2020-01-01

## 2019-01-01 RX ORDER — METOPROLOL SUCCINATE 50 MG/1
50 TABLET, EXTENDED RELEASE ORAL DAILY
Qty: 90 TABLET | Refills: 0 | Status: SHIPPED | OUTPATIENT
Start: 2019-01-01 | End: 2019-01-01 | Stop reason: ALTCHOICE

## 2019-01-01 RX ORDER — ATORVASTATIN CALCIUM 10 MG/1
10 TABLET, FILM COATED ORAL NIGHTLY
Start: 2019-01-01 | End: 2019-01-01

## 2019-01-01 RX ORDER — I-VITE, TAB 1000-60-2MG (60/BT) 300MCG-200
1 TAB ORAL DAILY
Status: DISCONTINUED | OUTPATIENT
Start: 2019-01-01 | End: 2019-01-01 | Stop reason: HOSPADM

## 2019-01-01 RX ORDER — METOPROLOL SUCCINATE 50 MG/1
150 TABLET, EXTENDED RELEASE ORAL
Qty: 30 TABLET | Refills: 0 | Status: SHIPPED | OUTPATIENT
Start: 2019-01-01 | End: 2019-01-01 | Stop reason: HOSPADM

## 2019-01-01 RX ORDER — METOPROLOL TARTRATE 50 MG/1
50 TABLET, FILM COATED ORAL 2 TIMES DAILY
Qty: 180 TABLET | Refills: 1 | Status: SHIPPED | OUTPATIENT
Start: 2019-01-01 | End: 2019-01-01 | Stop reason: SDUPTHER

## 2019-01-01 RX ORDER — METOPROLOL SUCCINATE 100 MG/1
200 TABLET, EXTENDED RELEASE ORAL DAILY
Qty: 180 TABLET | Refills: 0 | Status: SHIPPED | OUTPATIENT
Start: 2019-01-01 | End: 2019-01-01

## 2019-01-01 RX ORDER — METOPROLOL SUCCINATE 200 MG/1
200 TABLET, EXTENDED RELEASE ORAL
Qty: 30 TABLET | Refills: 0 | Status: SHIPPED | OUTPATIENT
Start: 2019-01-01 | End: 2019-01-01

## 2019-01-01 RX ORDER — LEVOTHYROXINE SODIUM 0.07 MG/1
75 TABLET ORAL DAILY
Qty: 30 TABLET | Refills: 0 | Status: SHIPPED | OUTPATIENT
Start: 2019-01-01 | End: 2019-01-01 | Stop reason: ALTCHOICE

## 2019-01-01 RX ORDER — METOPROLOL SUCCINATE 50 MG/1
100 TABLET, EXTENDED RELEASE ORAL
Status: DISCONTINUED | OUTPATIENT
Start: 2019-01-01 | End: 2019-01-01

## 2019-01-01 RX ORDER — FUROSEMIDE 20 MG/1
20 TABLET ORAL 2 TIMES DAILY
Qty: 60 TABLET | Refills: 2 | Status: SHIPPED | OUTPATIENT
Start: 2019-01-01 | End: 2019-01-01 | Stop reason: SDUPTHER

## 2019-01-01 RX ORDER — METOPROLOL SUCCINATE 25 MG/1
25 TABLET, EXTENDED RELEASE ORAL DAILY
Qty: 30 TABLET | Refills: 11 | Status: SHIPPED | OUTPATIENT
Start: 2019-01-01 | End: 2019-01-01

## 2019-01-01 RX ORDER — ATORVASTATIN CALCIUM 10 MG/1
10 TABLET, FILM COATED ORAL NIGHTLY
Qty: 90 TABLET | Refills: 3 | Status: SHIPPED | OUTPATIENT
Start: 2019-01-01 | End: 2019-01-01

## 2019-01-01 RX ORDER — MULTIVITAMIN
1 TABLET ORAL DAILY
Status: DISCONTINUED | OUTPATIENT
Start: 2019-01-01 | End: 2019-01-01 | Stop reason: HOSPADM

## 2019-01-01 RX ORDER — DILTIAZEM HYDROCHLORIDE 5 MG/ML
5 INJECTION INTRAVENOUS ONCE
Status: COMPLETED | OUTPATIENT
Start: 2019-01-01 | End: 2019-01-01

## 2019-01-01 RX ORDER — METOPROLOL TARTRATE 100 MG/1
150 TABLET ORAL 2 TIMES DAILY
Qty: 180 TABLET | Refills: 3 | Status: SHIPPED | OUTPATIENT
Start: 2019-01-01 | End: 2019-01-01

## 2019-01-01 RX ORDER — ATORVASTATIN CALCIUM 10 MG/1
10 TABLET, FILM COATED ORAL NIGHTLY
Qty: 30 TABLET | Refills: 0 | Status: SHIPPED | OUTPATIENT
Start: 2019-01-01 | End: 2019-01-01 | Stop reason: SDUPTHER

## 2019-01-01 RX ORDER — DIGOXIN 125 MCG
125 TABLET ORAL DAILY
Qty: 90 TABLET | Refills: 3 | Status: SHIPPED | OUTPATIENT
Start: 2019-01-01 | End: 2019-01-01

## 2019-01-01 RX ORDER — I-VITE, TAB 1000-60-2MG (60/BT) 300MCG-200
1 TAB ORAL DAILY
Start: 2019-01-01

## 2019-01-01 RX ORDER — METOPROLOL SUCCINATE 50 MG/1
50 TABLET, EXTENDED RELEASE ORAL ONCE
Status: COMPLETED | OUTPATIENT
Start: 2019-01-01 | End: 2019-01-01

## 2019-01-01 RX ORDER — METOPROLOL SUCCINATE 200 MG/1
200 TABLET, EXTENDED RELEASE ORAL
Qty: 30 TABLET | Refills: 0 | Status: SHIPPED | OUTPATIENT
Start: 2019-01-01 | End: 2019-01-01 | Stop reason: SDUPTHER

## 2019-01-01 RX ADMIN — APIXABAN 2.5 MG: 2.5 TABLET, FILM COATED ORAL at 21:51

## 2019-01-01 RX ADMIN — APIXABAN 2.5 MG: 2.5 TABLET, FILM COATED ORAL at 09:01

## 2019-01-01 RX ADMIN — Medication 1 TABLET: at 08:13

## 2019-01-01 RX ADMIN — INFLUENZA A VIRUS A/SINGAPORE/GP1908/2015 IVR-180 (H1N1) ANTIGEN (MDCK CELL DERIVED, PROPIOLACTONE INACTIVATED), INFLUENZA A VIRUS A/NORTH CAROLINA/04/2016 (H3N2) HEMAGGLUTININ ANTIGEN (MDCK CELL DERIVED, PROPIOLACTONE INACTIVATED), INFLUENZA B VIRUS B/IOWA/06/2017 HEMAGGLUTININ ANTIGEN (MDCK CELL DERIVED, PROPIOLACTONE INACTIVATED), INFLUENZA B VIRUS B/SINGAPORE/INFTT-16-0610/2016 HEMAGGLUTININ ANTIGEN (MDCK CELL DERIVED, PROPIOLACTONE INACTIVATED) 0.5 ML: 15; 15; 15; 15 INJECTION, SUSPENSION INTRAMUSCULAR at 18:02

## 2019-01-01 RX ADMIN — METOPROLOL TARTRATE 150 MG: 100 TABLET, FILM COATED ORAL at 20:25

## 2019-01-01 RX ADMIN — FUROSEMIDE 20 MG: 10 INJECTION, SOLUTION INTRAMUSCULAR; INTRAVENOUS at 08:45

## 2019-01-01 RX ADMIN — METOPROLOL TARTRATE 150 MG: 100 TABLET, FILM COATED ORAL at 09:01

## 2019-01-01 RX ADMIN — SODIUM CHLORIDE, PRESERVATIVE FREE 10 ML: 5 INJECTION INTRAVENOUS at 20:25

## 2019-01-01 RX ADMIN — SODIUM CHLORIDE, PRESERVATIVE FREE 10 ML: 5 INJECTION INTRAVENOUS at 09:21

## 2019-01-01 RX ADMIN — METOPROLOL TARTRATE 150 MG: 100 TABLET, FILM COATED ORAL at 01:33

## 2019-01-01 RX ADMIN — METOPROLOL TARTRATE 150 MG: 100 TABLET, FILM COATED ORAL at 09:20

## 2019-01-01 RX ADMIN — BUMETANIDE 1 MG: 0.25 INJECTION INTRAMUSCULAR; INTRAVENOUS at 22:26

## 2019-01-01 RX ADMIN — FUROSEMIDE 20 MG: 10 INJECTION, SOLUTION INTRAMUSCULAR; INTRAVENOUS at 13:35

## 2019-01-01 RX ADMIN — SODIUM CHLORIDE, PRESERVATIVE FREE 10 ML: 5 INJECTION INTRAVENOUS at 19:53

## 2019-01-01 RX ADMIN — ATORVASTATIN CALCIUM 10 MG: 10 TABLET, FILM COATED ORAL at 20:25

## 2019-01-01 RX ADMIN — I-VITE, TAB 1000-60-2MG (60/BT) 1 TABLET: TAB at 09:20

## 2019-01-01 RX ADMIN — METOPROLOL SUCCINATE 150 MG: 50 TABLET, FILM COATED, EXTENDED RELEASE ORAL at 09:32

## 2019-01-01 RX ADMIN — FUROSEMIDE 20 MG: 10 INJECTION, SOLUTION INTRAMUSCULAR; INTRAVENOUS at 08:06

## 2019-01-01 RX ADMIN — APIXABAN 2.5 MG: 2.5 TABLET, FILM COATED ORAL at 21:33

## 2019-01-01 RX ADMIN — Medication 5 MG/HR: at 13:39

## 2019-01-01 RX ADMIN — I-VITE, TAB 1000-60-2MG (60/BT) 1 TABLET: TAB at 08:14

## 2019-01-01 RX ADMIN — PANTOPRAZOLE SODIUM 40 MG: 40 TABLET, DELAYED RELEASE ORAL at 05:18

## 2019-01-01 RX ADMIN — METOPROLOL TARTRATE 150 MG: 100 TABLET, FILM COATED ORAL at 08:13

## 2019-01-01 RX ADMIN — Medication 1 TABLET: at 10:46

## 2019-01-01 RX ADMIN — APIXABAN 2.5 MG: 2.5 TABLET, FILM COATED ORAL at 09:20

## 2019-01-01 RX ADMIN — METOPROLOL TARTRATE 150 MG: 100 TABLET, FILM COATED ORAL at 19:53

## 2019-01-01 RX ADMIN — METOPROLOL SUCCINATE 150 MG: 50 TABLET, FILM COATED, EXTENDED RELEASE ORAL at 08:45

## 2019-01-01 RX ADMIN — SODIUM CHLORIDE, PRESERVATIVE FREE 10 ML: 5 INJECTION INTRAVENOUS at 08:07

## 2019-01-01 RX ADMIN — FUROSEMIDE 20 MG: 10 INJECTION, SOLUTION INTRAMUSCULAR; INTRAVENOUS at 22:14

## 2019-01-01 RX ADMIN — APIXABAN 2.5 MG: 2.5 TABLET, FILM COATED ORAL at 08:37

## 2019-01-01 RX ADMIN — PANTOPRAZOLE SODIUM 40 MG: 40 TABLET, DELAYED RELEASE ORAL at 04:50

## 2019-01-01 RX ADMIN — APIXABAN 2.5 MG: 2.5 TABLET, FILM COATED ORAL at 20:25

## 2019-01-01 RX ADMIN — I-VITE, TAB 1000-60-2MG (60/BT) 1 TABLET: TAB at 09:01

## 2019-01-01 RX ADMIN — PANTOPRAZOLE SODIUM 40 MG: 40 TABLET, DELAYED RELEASE ORAL at 05:08

## 2019-01-01 RX ADMIN — Medication 1 TABLET: at 09:32

## 2019-01-01 RX ADMIN — FUROSEMIDE 20 MG: 10 INJECTION, SOLUTION INTRAMUSCULAR; INTRAVENOUS at 09:33

## 2019-01-01 RX ADMIN — APIXABAN 2.5 MG: 2.5 TABLET, FILM COATED ORAL at 09:32

## 2019-01-01 RX ADMIN — ATORVASTATIN CALCIUM 10 MG: 10 TABLET, FILM COATED ORAL at 19:53

## 2019-01-01 RX ADMIN — BUMETANIDE 1 MG: 0.25 INJECTION INTRAMUSCULAR; INTRAVENOUS at 15:58

## 2019-01-01 RX ADMIN — METOPROLOL SUCCINATE 100 MG: 50 TABLET, FILM COATED, EXTENDED RELEASE ORAL at 18:00

## 2019-01-01 RX ADMIN — DILTIAZEM HYDROCHLORIDE 5 MG: 5 INJECTION INTRAVENOUS at 21:43

## 2019-01-01 RX ADMIN — SODIUM CHLORIDE, PRESERVATIVE FREE 10 ML: 5 INJECTION INTRAVENOUS at 09:01

## 2019-01-01 RX ADMIN — METOPROLOL SUCCINATE 50 MG: 50 TABLET, FILM COATED, EXTENDED RELEASE ORAL at 15:24

## 2019-01-01 RX ADMIN — Medication 1 TABLET: at 08:45

## 2019-01-01 RX ADMIN — METOPROLOL SUCCINATE 100 MG: 50 TABLET, FILM COATED, EXTENDED RELEASE ORAL at 08:06

## 2019-01-01 RX ADMIN — Medication 1 TABLET: at 09:01

## 2019-01-01 RX ADMIN — SODIUM CHLORIDE, PRESERVATIVE FREE 10 ML: 5 INJECTION INTRAVENOUS at 01:33

## 2019-01-01 RX ADMIN — SODIUM CHLORIDE, PRESERVATIVE FREE 10 ML: 5 INJECTION INTRAVENOUS at 08:46

## 2019-01-01 RX ADMIN — SODIUM CHLORIDE, PRESERVATIVE FREE 10 ML: 5 INJECTION INTRAVENOUS at 21:51

## 2019-01-01 RX ADMIN — APIXABAN 2.5 MG: 2.5 TABLET, FILM COATED ORAL at 01:33

## 2019-01-01 RX ADMIN — SODIUM CHLORIDE, PRESERVATIVE FREE 10 ML: 5 INJECTION INTRAVENOUS at 08:14

## 2019-01-01 RX ADMIN — Medication 5 MG/HR: at 10:00

## 2019-01-01 RX ADMIN — APIXABAN 2.5 MG: 2.5 TABLET, FILM COATED ORAL at 18:02

## 2019-01-01 RX ADMIN — APIXABAN 2.5 MG: 2.5 TABLET, FILM COATED ORAL at 08:06

## 2019-01-01 RX ADMIN — ATORVASTATIN CALCIUM 10 MG: 10 TABLET, FILM COATED ORAL at 01:33

## 2019-01-01 RX ADMIN — APIXABAN 2.5 MG: 2.5 TABLET, FILM COATED ORAL at 08:13

## 2019-01-01 RX ADMIN — APIXABAN 2.5 MG: 2.5 TABLET, FILM COATED ORAL at 19:53

## 2019-05-16 PROBLEM — I48.91 ATRIAL FIBRILLATION, NEW ONSET (HCC): Status: ACTIVE | Noted: 2019-01-01

## 2019-05-16 PROBLEM — R54 ADVANCED AGE: Status: ACTIVE | Noted: 2019-01-01

## 2019-05-16 NOTE — PROGRESS NOTES
Subjective   Chief Complaint   Patient presents with   • Atrial Fibrillation     PER PCP EKG       History of Present Illness  Patient is 89 years old white male who was seen by Dr. Jeffries today for routine checkup.  Patient was asymptomatic but was found to have irregular heart rate and EKG showed atrial fibrillation.  Patient was sent here for further evaluation.  Patient is completely asymptomatic.  He denies any chest pain palpitations shortness of breath near syncope or dizziness.    He cannot tell when irregular heartbeat started because he cannot feel it.  However he feels that it may have been 6 months to a year because he sometimes feels slight fluttering.    Patient has essential hypertension which is very well controlled with current medications.  He also has hyperlipidemia and is taking statin therapy.  He takes Ativan for anxiety.    There is no prior history of coronary artery disease, rheumatic fever or heart murmur.  Patient is 89 years old and appears much younger than his age.  He is independently functioning and physically active.  Actually drove from [place].  A friend accompanied him however.    Past Surgical History:   Procedure Laterality Date   • EXTERNAL EAR SURGERY      cancer   • HEMORRHOIDECTOMY     • HERNIA REPAIR       Family History   Problem Relation Age of Onset   • Hypertension Sister    • Hyperlipidemia Sister    • Hyperlipidemia Brother    • Hypertension Brother      Past Medical History:   Diagnosis Date   • Hyperlipidemia    • Hypertension        Patient Active Problem List   Diagnosis   • Diverticulosis of large intestine   • Essential hypertension   • Hyperlipidemia   • Dyssomnia   • Atrial fibrillation, new onset (CMS/HCC)   • Advanced age         Social History     Tobacco Use   • Smoking status: Former Smoker     Packs/day: 1.00     Years: 17.00     Pack years: 17.00     Types: Cigarettes     Start date: 1946     Last attempt to quit: 1963     Years since quitting:  56.4   • Smokeless tobacco: Never Used   Substance Use Topics   • Alcohol use: No   • Drug use: No         The following portions of the patient's history were reviewed and updated as appropriate: allergies, current medications, past family history, past medical history, past social history, past surgical history and problem list.    Allergies   Allergen Reactions   • Levofloxacin    • Penicillins Other (See Comments)     Made arms blue         Current Outpatient Medications:   •  Difluprednate (DUREZOL) 0.05 % emulsion, Apply  to eye 4 (Four) Times a Day As Needed., Disp: , Rfl:   •  LORazepam (ATIVAN) 1 MG tablet, Take 1 tablet by mouth Every Night., Disp: 30 tablet, Rfl: 2  •  losartan (COZAAR) 50 MG tablet, Take 1 tablet by mouth Daily., Disp: 30 tablet, Rfl: 6  •  Multiple Vitamins-Minerals (CENTRUM SILVER ADULT 50+) tablet, Take  by mouth daily., Disp: , Rfl:   •  Multiple Vitamins-Minerals (ICAPS PO), Take 1 tablet by mouth 2 (Two) Times a Day., Disp: , Rfl:   •  Psyllium (METAMUCIL) 28.3 % powder, Take  by mouth daily as needed., Disp: , Rfl:   •  simvastatin (ZOCOR) 10 MG tablet, TAKE 1 TABLET DAILY AT BEDTIME, Disp: 90 tablet, Rfl: 3  •  vitamin B-12 (CYANOCOBALAMIN) 500 MCG tablet, Take 5,000 mcg by mouth Daily., Disp: , Rfl:   •  apixaban (ELIQUIS) 2.5 MG tablet tablet, Take 1 tablet by mouth Every 12 (Twelve) Hours., Disp: 60 tablet, Rfl: 3    Review of Systems   Constitution: Negative.   HENT: Negative.  Negative for congestion.    Eyes: Negative.    Cardiovascular: Negative.  Negative for chest pain, cyanosis, dyspnea on exertion, irregular heartbeat, leg swelling, near-syncope, orthopnea, palpitations, paroxysmal nocturnal dyspnea and syncope.   Respiratory: Negative.  Negative for shortness of breath.    Hematologic/Lymphatic: Negative.    Skin: Negative.    Musculoskeletal: Negative.    Gastrointestinal: Negative.    Genitourinary: Negative.    Neurological: Negative.  Negative for headaches.  "  Psychiatric/Behavioral: The patient is nervous/anxious.    Allergic/Immunologic: Negative.         Objective      /72 (BP Location: Left arm, Patient Position: Sitting)   Pulse 89   Ht 168.9 cm (66.5\")   Wt 72.6 kg (160 lb)   SpO2 91%   BMI 25.44 kg/m²     Physical Exam   Constitutional: He appears well-developed and well-nourished. No distress.   HENT:   Head: Normocephalic and atraumatic.   Mouth/Throat: Oropharynx is clear and moist. No oropharyngeal exudate.   Eyes: Conjunctivae and EOM are normal. Pupils are equal, round, and reactive to light. No scleral icterus.   Neck: Normal range of motion. Neck supple. No JVD present. No tracheal deviation present. No thyromegaly present.   Cardiovascular: Normal heart sounds and intact distal pulses. An irregularly irregular rhythm present. Tachycardia present. PMI is not displaced. Exam reveals no gallop, no friction rub and no decreased pulses.   No murmur heard.  Pulses:       Carotid pulses are 3+ on the right side, and 3+ on the left side.       Radial pulses are 3+ on the right side, and 3+ on the left side.   Pulmonary/Chest: Effort normal and breath sounds normal. No respiratory distress. He has no wheezes. He has no rales. He exhibits no tenderness.   Abdominal: Soft. Bowel sounds are normal. He exhibits no distension, no abdominal bruit and no mass. There is no splenomegaly or hepatomegaly. There is no tenderness. There is no rebound and no guarding.   Musculoskeletal: Normal range of motion. He exhibits no edema, tenderness or deformity.   Lymphadenopathy:     He has no cervical adenopathy.   Neurological: He is alert. He has normal reflexes. No cranial nerve deficit. He exhibits normal muscle tone. Coordination normal.   Skin: Skin is warm and dry. No rash noted. He is not diaphoretic. No erythema.   Psychiatric: He has a normal mood and affect. His behavior is normal. Judgment and thought content normal.       Lab Review:    Lab Results "   Component Value Date     11/16/2018    K 4.9 11/16/2018     11/16/2018    BUN 26 (H) 11/16/2018    CREATININE 1.20 11/16/2018    GLUCOSE 96 11/16/2018    CALCIUM 8.9 11/16/2018    ALT 17 11/16/2018    ALKPHOS 73 11/16/2018    LABIL2 1.7 11/23/2015     No results found for: CKTOTAL  Lab Results   Component Value Date    WBC 9.80 11/16/2018    HGB 14.5 11/16/2018    HCT 42.2 11/16/2018     11/16/2018     No results found for: INR  No results found for: MG  Lab Results   Component Value Date    PSA 1.870 11/16/2018    CHOL 124 11/16/2018    CHLPL 118 11/23/2015    TRIG 50 11/16/2018    HDL 38 (L) 11/16/2018    VLDL 10 11/16/2018    LDLHDL 2.00 11/16/2018     No results found for: BNP    Procedures    I reviewed the patient's new clinical results.  I personally viewed and interpreted the patient's EKG/lab data        Assessment:   Diagnosis Plan   1. Atrial fibrillation, new onset (CMS/HCC)     2. Hyperlipidemia, unspecified hyperlipidemia type     3. Essential hypertension     4. Advanced age            Plan:  Patient is 89 years old white male who appears much younger than his stated age.  He is physically very active and independent.  He drove himself from [place].  He was found to have new onset atrial fibrillation heart rate is around 100/min.  Patient thinks that he may be having irregular rhythm for 6 months to a year.  His last EKG when he was in sinus rhythm is November 26, 2018.  At that time he had frequent PACs and PVCs.    Because patient is completely asymptomatic and heart rate is relatively stable.  No beta-blocker therapy was initiated.  Rhythm control also was not offered at this time because he may have a thrombus already.    He was started on Eliquis 2.5 twice daily.  He will be reevaluated in 2 weeks at that time a CBC and EKG will be reviewed again.  Risks of bleeding because of his age were extensively discussed and explained to him.  He was advised to discontinue his  baby aspirin.  Echocardiogram was also scheduled.  Follow-up in 2 weeks    Thank you for giving me the oppertunity to participate in your patient's cardiac care.    Sincerely,    RAMON Iniguez M.D. [facility]     No Follow-up on file.

## 2019-05-16 NOTE — PROGRESS NOTES
Subjective   Hugo Colon is a 89 y.o. male.     Chief Complaint   Patient presents with   • Anxiety   • Memory Loss   • Fatigue       He presents with c/o his heart beating fast. He states he has been noticing it for quite a while. He states his heart rate was 111 when he checked his blood pressure last night. He states his blood pressure was high. He states he has some shortness of breath and gets tired easily.     He also presents with c/o difficulty remembering things. He states he noticed he was having memory problems about 6 months ago. He states he can remember things from years ago. He states he forgets things that he has to do and if he goes into a room, he might forget why he went in there. He states he forgets to brush his teeth sometimes. He states he has a friend that gets mad at him for being late.     He also presents with c/o anxiety early in the mornings. He worries about what he has to do during the day and what he hasn't done.         The following portions of the patient's history were reviewed and updated as appropriate: allergies, current medications, past family history, past medical history, past social history, past surgical history and problem list.    Review of Systems   Constitutional: Negative for appetite change, chills, fever and unexpected weight change.   HENT: Positive for rhinorrhea. Negative for ear pain, sore throat and trouble swallowing.    Eyes: Negative for visual disturbance.   Respiratory: Positive for cough and shortness of breath. Negative for wheezing.    Cardiovascular: Positive for palpitations. Negative for chest pain.   Gastrointestinal: Negative for abdominal pain, blood in stool, constipation, diarrhea, nausea and vomiting.   Endocrine: Positive for cold intolerance. Negative for heat intolerance.   Genitourinary: Positive for hematuria. Negative for dysuria.   Musculoskeletal: Negative for arthralgias and myalgias.   Skin: Negative for color change.  "  Allergic/Immunologic: Negative for environmental allergies.   Neurological: Negative for dizziness, seizures, syncope and headaches.   Hematological: Negative for adenopathy.   Psychiatric/Behavioral: Positive for sleep disturbance. Negative for suicidal ideas. The patient is nervous/anxious.        Objective     /74 (BP Location: Left arm, Patient Position: Sitting, Cuff Size: Large Adult)   Pulse 111   Temp 97.8 °F (36.6 °C)   Ht 168.9 cm (66.5\")   Wt 72.4 kg (159 lb 11.2 oz)   SpO2 92%   BMI 25.39 kg/m²     Physical Exam   Constitutional: He is oriented to person, place, and time. He appears well-developed and well-nourished. No distress.   HENT:   Head: Normocephalic and atraumatic.   Right Ear: Hearing, tympanic membrane, external ear and ear canal normal.   Left Ear: Hearing, tympanic membrane, external ear and ear canal normal.   Nose: Nose normal. Right sinus exhibits no maxillary sinus tenderness and no frontal sinus tenderness. Left sinus exhibits no maxillary sinus tenderness and no frontal sinus tenderness.   Mouth/Throat: Uvula is midline, oropharynx is clear and moist and mucous membranes are normal.   Eyes: Conjunctivae and lids are normal. Pupils are equal, round, and reactive to light.   Neck: Trachea normal. No tracheal tenderness present.   Cardiovascular: S1 normal, S2 normal, normal heart sounds and intact distal pulses. An irregularly irregular rhythm present. Tachycardia present. Exam reveals no gallop and no friction rub.   No murmur heard.  Pulmonary/Chest: Effort normal and breath sounds normal. No respiratory distress.   Abdominal: Soft. Bowel sounds are normal. He exhibits no distension. There is no tenderness.   Neurological: He is alert and oriented to person, place, and time. He has normal strength and normal reflexes. No cranial nerve deficit or sensory deficit.   Skin: Skin is warm and dry. He is not diaphoretic.   Psychiatric: He has a normal mood and affect. His " behavior is normal. Judgment and thought content normal.       Assessment/Plan     Problem List Items Addressed This Visit        Cardiovascular and Mediastinum    Hyperlipidemia    Relevant Orders    Lipid Panel    Atrial fibrillation, new onset (CMS/HCC)    Relevant Orders    Ambulatory Referral to Cardiology      Other Visit Diagnoses     Palpitations    -  Primary    Relevant Orders    CBC & Differential    Comprehensive Metabolic Panel    TSH    ECG 12 Lead    CBC Auto Differential    Anxiety        Memory loss of unknown cause            Plan: EKG obtained and reviewed. Referred to cardiology for new onset atrial fibrillation.  Patient scored 26 on cognitive exam. Anxiety may be related to afib.      Patient's Body mass index is 25.39 kg/m². BMI is within normal parameters. No follow-up required..   (Normal BMI:  18.5-24.9, OW 25-29.9, Obesity 30 or greater)          Understands disease processes and need for medications.  Understands reasons for urgent and emergent care.  Patient (& family) verbalized agreement for treatment plan.   Emotional support and active listening provided.  Patient provided time to verbalize feelings.               This document has been electronically signed by JULI Card   May 16, 2019 8:08 PM

## 2019-05-17 NOTE — PROGRESS NOTES
Let him know his thyroid is not working well and that can be causing his abnormal heart rhythm, anxiety, and memory loss. We need to start him on thyroid treatment. I am sending levothyroxine 50mcg to the pharmacy and we need to recheck his thyroid level in one month. Will you call Evraisto as well to help him remember this?  Thank You,  Deysi

## 2019-05-30 PROBLEM — E03.8 OTHER SPECIFIED HYPOTHYROIDISM: Status: ACTIVE | Noted: 2019-01-01

## 2019-05-30 NOTE — PROGRESS NOTES
subjective     Chief Complaint   Patient presents with   • Atrial Fibrillation     History of Present Illness  Patient is 89 years old white male who was seen by me for new onset atrial fibrillation.  Patient was last normal sinus rhythm EKG was in 2018.  Patient cannot feel palpitations.  Atrial fibrillation was accidentally discovered on routine EKG.  Patient was started by me on Eliquis 2.5 twice daily because of advanced age and mild renal failure.  Patient was supposed to have an echocardiogram done but he has not had that done yet.  He is back for follow-up.  He still cannot feel any palpitations.  He has no specific complaints and doing very well.    Past Surgical History:   Procedure Laterality Date   • EXTERNAL EAR SURGERY      cancer   • HEMORRHOIDECTOMY     • HERNIA REPAIR       Family History   Problem Relation Age of Onset   • Hypertension Sister    • Hyperlipidemia Sister    • Hyperlipidemia Brother    • Hypertension Brother      Past Medical History:   Diagnosis Date   • Hyperlipidemia    • Hypertension      Patient Active Problem List   Diagnosis   • Diverticulosis of large intestine   • Essential hypertension   • Hyperlipidemia   • Dyssomnia   • Atrial fibrillation, new onset (CMS/HCC)   • Advanced age   • Other specified hypothyroidism       Social History     Tobacco Use   • Smoking status: Former Smoker     Packs/day: 1.00     Years: 17.00     Pack years: 17.00     Types: Cigarettes     Start date:      Last attempt to quit:      Years since quittin.4   • Smokeless tobacco: Never Used   Substance Use Topics   • Alcohol use: No   • Drug use: No       Allergies   Allergen Reactions   • Levofloxacin    • Penicillins Other (See Comments)     Made arms blue       Current Outpatient Medications on File Prior to Visit   Medication Sig   • Difluprednate (DUREZOL) 0.05 % emulsion Apply  to eye 4 (Four) Times a Day As Needed.   • levothyroxine (SYNTHROID) 50 MCG tablet Take 1 tablet  "by mouth Daily.   • losartan (COZAAR) 50 MG tablet Take 1 tablet by mouth Daily.   • Multiple Vitamins-Minerals (CENTRUM SILVER ADULT 50+) tablet Take  by mouth daily.   • Psyllium (METAMUCIL) 28.3 % powder Take  by mouth daily as needed.   • vitamin B-12 (CYANOCOBALAMIN) 500 MCG tablet Take 5,000 mcg by mouth Daily.   • [DISCONTINUED] apixaban (ELIQUIS) 2.5 MG tablet tablet Take 1 tablet by mouth Every 12 (Twelve) Hours.   • [DISCONTINUED] LORazepam (ATIVAN) 1 MG tablet Take 1 tablet by mouth Every Night.   • [DISCONTINUED] Multiple Vitamins-Minerals (ICAPS PO) Take 1 tablet by mouth 2 (Two) Times a Day.   • [DISCONTINUED] simvastatin (ZOCOR) 10 MG tablet TAKE 1 TABLET DAILY AT BEDTIME     No current facility-administered medications on file prior to visit.          The following portions of the patient's history were reviewed and updated as appropriate: allergies, current medications, past family history, past medical history, past social history, past surgical history and problem list.    Review of Systems   Constitution: Positive for malaise/fatigue.   HENT: Negative.  Negative for congestion.    Eyes: Negative.    Cardiovascular: Negative.  Negative for chest pain, cyanosis, dyspnea on exertion, irregular heartbeat, leg swelling, near-syncope, orthopnea, palpitations, paroxysmal nocturnal dyspnea and syncope.   Respiratory: Negative.  Negative for shortness of breath.    Hematologic/Lymphatic: Negative.    Musculoskeletal: Negative.    Gastrointestinal: Negative.    Neurological: Negative.  Negative for headaches.          Objective:     /74   Pulse 120   Ht 168.9 cm (66.5\")   Wt 72.6 kg (160 lb)   SpO2 95%   BMI 25.44 kg/m²   Physical Exam   Constitutional: He appears well-developed and well-nourished. No distress.   HENT:   Head: Normocephalic and atraumatic.   Mouth/Throat: Oropharynx is clear and moist. No oropharyngeal exudate.   Eyes: Conjunctivae and EOM are normal. Pupils are equal, round, " and reactive to light. No scleral icterus.   Neck: Normal range of motion. Neck supple. No JVD present. No tracheal deviation present. No thyromegaly present.   Cardiovascular: Normal rate, normal heart sounds and intact distal pulses. An irregular rhythm present. PMI is not displaced. Exam reveals no gallop, no friction rub and no decreased pulses.   No murmur heard.  Pulses:       Carotid pulses are 3+ on the right side, and 3+ on the left side.       Radial pulses are 3+ on the right side, and 3+ on the left side.   Pulmonary/Chest: Effort normal and breath sounds normal. No respiratory distress. He has no wheezes. He has no rales. He exhibits no tenderness.   Abdominal: Soft. Bowel sounds are normal. He exhibits no distension, no abdominal bruit and no mass. There is no splenomegaly or hepatomegaly. There is no tenderness. There is no rebound and no guarding.   Musculoskeletal: Normal range of motion. He exhibits no edema, tenderness or deformity.   Lymphadenopathy:     He has no cervical adenopathy.   Neurological: He is alert. He has normal reflexes. No cranial nerve deficit. He exhibits normal muscle tone. Coordination normal.   Skin: Skin is warm and dry. No rash noted. He is not diaphoretic. No erythema.   Psychiatric: He has a normal mood and affect. His behavior is normal. Judgment and thought content normal.         Lab Review  Lab Results   Component Value Date     05/16/2019    K 4.9 05/16/2019     05/16/2019    BUN 27 (H) 05/16/2019    CREATININE 1.31 (H) 05/16/2019    GLUCOSE 85 05/16/2019    CALCIUM 9.4 05/16/2019    ALT 14 05/16/2019    ALKPHOS 70 05/16/2019    LABIL2 1.7 11/23/2015     No results found for: CKTOTAL  Lab Results   Component Value Date    WBC 8.07 05/16/2019    HGB 14.7 05/16/2019    HCT 46.6 05/16/2019     05/16/2019     No results found for: INR  No results found for: MG  Lab Results   Component Value Date    PSA 1.870 11/16/2018    TSH 7.950 (H) 05/16/2019      No results found for: BNP  Lab Results   Component Value Date    CHLPL 118 11/23/2015    CHOL 135 05/16/2019    TRIG 62 05/16/2019    HDL 40 05/16/2019    VLDL 12.4 05/16/2019    LDLHDL 2.07 05/16/2019     Lab Results   Component Value Date    LDL 83 05/16/2019    LDL 76 11/16/2018         ECG 12 Lead  Date/Time: 5/30/2019 2:59 PM  Performed by: Samantha Iniguez MD  Authorized by: Samantha Iniguez MD   Comparison: compared with previous ECG from 5/16/2019  Comparison to previous ECG: Heart rate is faster now  Rhythm: atrial fibrillation  Ectopy: infrequent PVCs  Rate: tachycardic  BPM: 129  Conduction: conduction normal  QRS axis: right  Other findings: non-specific ST-T wave changes    Clinical impression: abnormal EKG               I personally viewed and interpreted the patient's LAB data         Assessment:     1. Atrial fibrillation, new onset (CMS/HCC)    2. Essential hypertension    3. Other specified hypothyroidism          Plan:     Patient apparently recently has started Synthroid 50 mcg daily.  He was advised to decrease it to 25 mcg daily.    Heart rate is quite fast although he is asymptomatic patient was started on Toprol-XL 25 daily.    Patient will continue Eliquis 2.5 twice daily  Patient is almost 89 years old.  He had a prolonged QT when he was in normal sinus rhythm.  I would not want to try rhythm control.  Patient is asymptomatic we will try rate control only.  Echo has been scheduled for evaluation.  Follow-up in 1 month        No Follow-up on file.

## 2019-06-20 NOTE — PROGRESS NOTES
Tell him the dose of his thyroid medicine is not high enough. I sent a higher dose to the pharmacy that he should take every morning. He needs to come in in one month for us to recheck his thyroid level.

## 2019-07-01 PROBLEM — Z79.01 CHRONIC ANTICOAGULATION: Status: ACTIVE | Noted: 2019-01-01

## 2019-07-01 PROBLEM — N18.2 CHRONIC RENAL FAILURE, STAGE 2 (MILD): Status: ACTIVE | Noted: 2019-01-01

## 2019-07-01 PROBLEM — R06.09 DOE (DYSPNEA ON EXERTION): Status: ACTIVE | Noted: 2019-01-01

## 2019-07-01 NOTE — PROGRESS NOTES
subjective     Chief Complaint   Patient presents with   • Atrial Fibrillation     Follow up   • Med Management     History of Present Illness  Patient is 89 years old elderly gentleman who was found to have atrial fibrillation with rapid ventricular rate.  Patient is here for follow-up.  He is not sure what medication he is taking.  He knows that he is taking Eliquis 2.5 twice daily and Toprol-XL 25 daily.  He is not sure if he is taking losartan.  He is taking Synthroid 75 mcg daily.  He denies any chest pain or palpitation.  He is not aware of his heart rate.    Past Surgical History:   Procedure Laterality Date   • EXTERNAL EAR SURGERY      cancer   • HEMORRHOIDECTOMY     • HERNIA REPAIR       Family History   Problem Relation Age of Onset   • Hypertension Sister    • Hyperlipidemia Sister    • Hyperlipidemia Brother    • Hypertension Brother      Past Medical History:   Diagnosis Date   • Hyperlipidemia    • Hypertension      Patient Active Problem List   Diagnosis   • Diverticulosis of large intestine   • Essential hypertension   • Hyperlipidemia   • Dyssomnia   • Atrial fibrillation,with rvr   • Advanced age   • Other specified hypothyroidism   • Chronic anticoagulation with low-dose Eliquis   • Chronic renal failure, stage 2 (mild)       Social History     Tobacco Use   • Smoking status: Former Smoker     Packs/day: 1.00     Years: 17.00     Pack years: 17.00     Types: Cigarettes     Start date:      Last attempt to quit:      Years since quittin.5   • Smokeless tobacco: Never Used   Substance Use Topics   • Alcohol use: No   • Drug use: No       Allergies   Allergen Reactions   • Levofloxacin    • Penicillins Other (See Comments)     Made arms blue       Current Outpatient Medications on File Prior to Visit   Medication Sig   • apixaban (ELIQUIS) 2.5 MG tablet tablet Take 1 tablet by mouth Every 12 (Twelve) Hours.   • Difluprednate (DUREZOL) 0.05 % emulsion Apply  to eye 4 (Four) Times a Day  "As Needed.   • levothyroxine (SYNTHROID) 75 MCG tablet Take 1 tablet by mouth Daily.   • Multiple Vitamins-Minerals (CENTRUM SILVER ADULT 50+) tablet Take  by mouth daily.   • Psyllium (METAMUCIL) 28.3 % powder Take  by mouth daily as needed.   • vitamin B-12 (CYANOCOBALAMIN) 500 MCG tablet Take 5,000 mcg by mouth Daily.   • [DISCONTINUED] metoprolol succinate XL (TOPROL-XL) 25 MG 24 hr tablet Take 1 tablet by mouth Daily.   • losartan (COZAAR) 50 MG tablet Take 1 tablet by mouth Daily.     No current facility-administered medications on file prior to visit.          The following portions of the patient's history were reviewed and updated as appropriate: allergies, current medications, past family history, past medical history, past social history, past surgical history and problem list.    Review of Systems   Constitution: Negative.   HENT: Negative.  Negative for congestion.    Eyes: Negative.    Cardiovascular: Negative.  Negative for chest pain, cyanosis, dyspnea on exertion, irregular heartbeat, leg swelling, near-syncope, orthopnea, palpitations, paroxysmal nocturnal dyspnea and syncope.        Atrial fibrillation by history patient has no symptoms and is not aware.  He is not having any palpitations   Respiratory: Negative.  Negative for shortness of breath.    Hematologic/Lymphatic: Negative.    Musculoskeletal: Negative.    Gastrointestinal: Negative.    Neurological: Negative.  Negative for headaches.          Objective:     /74 (BP Location: Left arm, Patient Position: Sitting, Cuff Size: Adult)   Pulse (!) 121   Ht 165.1 cm (65\")   Wt 71.2 kg (157 lb)   SpO2 98%   BMI 26.13 kg/m²   Physical Exam   Constitutional: He appears well-developed and well-nourished. No distress.   HENT:   Head: Normocephalic and atraumatic.   Mouth/Throat: Oropharynx is clear and moist. No oropharyngeal exudate.   Eyes: Conjunctivae and EOM are normal. Pupils are equal, round, and reactive to light. No scleral " icterus.   Neck: Normal range of motion. Neck supple. No JVD present. No tracheal deviation present. No thyromegaly present.   Cardiovascular: Normal heart sounds and intact distal pulses. An irregularly irregular rhythm present. Tachycardia present. PMI is not displaced. Exam reveals no gallop, no friction rub and no decreased pulses.   No murmur heard.  Pulses:       Carotid pulses are 3+ on the right side, and 3+ on the left side.       Radial pulses are 3+ on the right side, and 3+ on the left side.   Pulmonary/Chest: Effort normal and breath sounds normal. No respiratory distress. He has no wheezes. He has no rales. He exhibits no tenderness.   Abdominal: Soft. Bowel sounds are normal. He exhibits no distension, no abdominal bruit and no mass. There is no splenomegaly or hepatomegaly. There is no tenderness. There is no rebound and no guarding.   Musculoskeletal: Normal range of motion. He exhibits no edema, tenderness or deformity.   Lymphadenopathy:     He has no cervical adenopathy.   Neurological: He is alert. He has normal reflexes. No cranial nerve deficit. He exhibits normal muscle tone. Coordination normal.   Skin: Skin is warm and dry. No rash noted. He is not diaphoretic. No erythema.   Psychiatric: He has a normal mood and affect. His behavior is normal. Judgment and thought content normal.         Lab Review  Lab Results   Component Value Date     05/16/2019    K 4.9 05/16/2019     05/16/2019    BUN 27 (H) 05/16/2019    CREATININE 1.31 (H) 05/16/2019    GLUCOSE 85 05/16/2019    CALCIUM 9.4 05/16/2019    ALT 14 05/16/2019    ALKPHOS 70 05/16/2019    LABIL2 1.7 11/23/2015     No results found for: CKTOTAL  Lab Results   Component Value Date    WBC 8.07 05/16/2019    HGB 14.7 05/16/2019    HCT 46.6 05/16/2019     05/16/2019     No results found for: INR  No results found for: MG  Lab Results   Component Value Date    PSA 1.870 11/16/2018    TSH 6.360 (H) 06/19/2019     No results  found for: BNP  Lab Results   Component Value Date    CHLPL 118 11/23/2015    CHOL 135 05/16/2019    TRIG 62 05/16/2019    HDL 40 05/16/2019    VLDL 12.4 05/16/2019    LDLHDL 2.07 05/16/2019     Lab Results   Component Value Date    LDL 83 05/16/2019    LDL 76 11/16/2018         ECG 12 Lead  Date/Time: 7/1/2019 4:57 PM  Performed by: Samantha Iniguez MD  Authorized by: Samantha Iniguez MD   Comparison: compared with previous ECG from 5/30/2019  Similar to previous ECG  Rhythm: atrial fibrillation  Rate: tachycardic  BPM: 121  QRS axis: normal  Other findings: non-specific ST-T wave changes    Clinical impression: abnormal EKG               I personally viewed and interpreted the patient's LAB data         Assessment:     1. Atrial fibrillation,with rvr    2. Essential hypertension    3. Hyperlipidemia, unspecified hyperlipidemia type    4. Chronic anticoagulation with low-dose Eliquis    5. Advanced age    6. Chronic renal failure, stage 2 (mild)    7. Other specified hypothyroidism          Plan:     Patient has a defibrillator with rapid ventricular atrial fibrillation repeat was advised to increase Toprol XL 50 mg daily  He will continue Eliquis 2.5 daily  Echocardiogram was scheduled.  Follow-up in 1 month        No Follow-up on file.

## 2019-07-16 NOTE — TELEPHONE ENCOUNTER
----- Message from Samantha Iniguez MD sent at 7/16/2019  2:45 PM EDT -----  Echocardiogram reviewed  Make sure he is taking Toprol-XL 50 daily  Bring his medications with him when he comes

## 2019-07-17 NOTE — PROGRESS NOTES
Let him know his thyroid function is doing well with the levothyroxine. He needs to keep taking the levothyroxine every day and he needs a follow up at the end of August or early September.

## 2019-07-29 PROBLEM — I35.0 NONRHEUMATIC AORTIC VALVE STENOSIS: Status: ACTIVE | Noted: 2019-01-01

## 2019-07-29 PROBLEM — I48.19 PERSISTENT ATRIAL FIBRILLATION (HCC): Status: ACTIVE | Noted: 2019-01-01

## 2019-07-29 NOTE — PROGRESS NOTES
subjective     Chief Complaint   Patient presents with   • Atrial Fibrillation   • Follow-up   • Results     ECHO LABS     History of Present Illness  Patient is 89 years old white male who is here for cardiology follow-up.  Patient has no symptoms.  He was found to have atrial fibrillation with rapid ventricular rate.  He has been taking Toprol-XL 50 mg daily.  Heart rate is down to 84/min.  Patient is still completely asymptomatic.    Unfortunately patient stopped his Eliquis  He states that he stopped all his medication except for Toprol.  Patient had echocardiogram done and is here for results.  No syncope, presyncope, chest pain or shortness of breath.    Past Surgical History:   Procedure Laterality Date   • EXTERNAL EAR SURGERY      cancer   • HEMORRHOIDECTOMY     • HERNIA REPAIR       Family History   Problem Relation Age of Onset   • Hypertension Sister    • Hyperlipidemia Sister    • Hyperlipidemia Brother    • Hypertension Brother      Past Medical History:   Diagnosis Date   • Hyperlipidemia    • Hypertension      Patient Active Problem List   Diagnosis   • Diverticulosis of large intestine   • Dyssomnia   • Persistent atrial fibrillation (CMS/HCC)   • Advanced age   • Other specified hypothyroidism   • Chronic anticoagulation with low-dose Eliquis   • Chronic renal failure, stage 2 (mild)   • Nonrheumatic aortic valve stenosis       Social History     Tobacco Use   • Smoking status: Former Smoker     Packs/day: 1.00     Years: 17.00     Pack years: 17.00     Types: Cigarettes     Start date:      Last attempt to quit: 1963     Years since quittin.6   • Smokeless tobacco: Never Used   Substance Use Topics   • Alcohol use: No   • Drug use: No       Allergies   Allergen Reactions   • Levofloxacin    • Penicillins Other (See Comments)     Made arms blue       Current Outpatient Medications on File Prior to Visit   Medication Sig   • metoprolol succinate XL (TOPROL-XL) 50 MG 24 hr tablet Take 1  "tablet by mouth Daily.   • [DISCONTINUED] Difluprednate (DUREZOL) 0.05 % emulsion Apply  to eye 4 (Four) Times a Day As Needed.   • [DISCONTINUED] levothyroxine (SYNTHROID) 75 MCG tablet Take 1 tablet by mouth Daily.   • [DISCONTINUED] losartan (COZAAR) 50 MG tablet Take 1 tablet by mouth Daily.   • [DISCONTINUED] Multiple Vitamins-Minerals (CENTRUM SILVER ADULT 50+) tablet Take  by mouth daily.   • [DISCONTINUED] Psyllium (METAMUCIL) 28.3 % powder Take  by mouth Daily As Needed.   • [DISCONTINUED] vitamin B-12 (CYANOCOBALAMIN) 500 MCG tablet Take 5,000 mcg by mouth Daily.   • [DISCONTINUED] apixaban (ELIQUIS) 2.5 MG tablet tablet Take 1 tablet by mouth Every 12 (Twelve) Hours.     No current facility-administered medications on file prior to visit.          The following portions of the patient's history were reviewed and updated as appropriate: allergies, current medications, past family history, past medical history, past social history, past surgical history and problem list.    Review of Systems   Constitution: Negative.   HENT: Negative.  Negative for congestion.    Eyes: Negative.    Cardiovascular: Negative.  Negative for chest pain, cyanosis, dyspnea on exertion, irregular heartbeat, leg swelling, near-syncope, orthopnea, palpitations, paroxysmal nocturnal dyspnea and syncope.   Respiratory: Negative.  Negative for shortness of breath.    Hematologic/Lymphatic: Negative.    Musculoskeletal: Negative.    Gastrointestinal: Negative.    Neurological: Negative.  Negative for headaches.          Objective:     /70 (BP Location: Left arm, Patient Position: Sitting)   Pulse 83   Ht 165.1 cm (65\")   Wt 70.8 kg (156 lb)   SpO2 98%   BMI 25.96 kg/m²   Physical Exam   Constitutional: He appears well-developed and well-nourished. No distress.   HENT:   Head: Normocephalic and atraumatic.   Mouth/Throat: Oropharynx is clear and moist. No oropharyngeal exudate.   Eyes: Conjunctivae and EOM are normal. Pupils " are equal, round, and reactive to light. No scleral icterus.   Neck: Normal range of motion. Neck supple. No JVD present. No tracheal deviation present. No thyromegaly present.   Cardiovascular: Normal rate and intact distal pulses. An irregular rhythm present. PMI is not displaced. Exam reveals no gallop, no friction rub and no decreased pulses.   Murmur heard.  Pulses:       Carotid pulses are 3+ on the right side, and 3+ on the left side.       Radial pulses are 3+ on the right side, and 3+ on the left side.   Pulmonary/Chest: Effort normal and breath sounds normal. No respiratory distress. He has no wheezes. He has no rales. He exhibits no tenderness.   Abdominal: Soft. Bowel sounds are normal. He exhibits no distension, no abdominal bruit and no mass. There is no splenomegaly or hepatomegaly. There is no tenderness. There is no rebound and no guarding.   Musculoskeletal: Normal range of motion. He exhibits no edema, tenderness or deformity.   Lymphadenopathy:     He has no cervical adenopathy.   Neurological: He is alert. He has normal reflexes. No cranial nerve deficit. He exhibits normal muscle tone. Coordination normal.   Skin: Skin is warm and dry. No rash noted. He is not diaphoretic. No erythema.   Psychiatric: He has a normal mood and affect. His behavior is normal. Judgment and thought content normal.         Lab Review  Lab Results   Component Value Date     05/16/2019    K 4.9 05/16/2019     05/16/2019    BUN 27 (H) 05/16/2019    CREATININE 1.31 (H) 05/16/2019    GLUCOSE 85 05/16/2019    CALCIUM 9.4 05/16/2019    ALT 14 05/16/2019    ALKPHOS 70 05/16/2019    LABIL2 1.7 11/23/2015     No results found for: CKTOTAL  Lab Results   Component Value Date    WBC 8.07 05/16/2019    HGB 14.7 05/16/2019    HCT 46.6 05/16/2019     05/16/2019     No results found for: INR  No results found for: MG  Lab Results   Component Value Date    PSA 1.870 11/16/2018    TSH 4.230 (H) 07/16/2019     No  results found for: BNP  Lab Results   Component Value Date    CHLPL 118 11/23/2015    CHOL 135 05/16/2019    TRIG 62 05/16/2019    HDL 40 05/16/2019    VLDL 12.4 05/16/2019    LDLHDL 2.07 05/16/2019     Lab Results   Component Value Date    LDL 83 05/16/2019    LDL 76 11/16/2018       Procedures  Interpretation Summary echocardiogram    · Normal left ventricular cavity size and wall thickness noted. There is left ventricular global hypokinesis  · Left ventricular systolic function is moderately decreased. Estimated EF appears to be around 35%.  · There is moderate calcification of the aortic valve, Mild aortic valve regurgitation is present. Severe aortic valve stenosis is noted.Ao max PG 25.2 mmHg, Ao mean PG 14.4 mmHg, JOSE(I,D) 1.14 cm^2  · Mild-to-moderate mitral valve regurgitation is present  · Mild tricuspid valve regurgitation is present. Mild pulmonary hypertension is present,(35-45 mmHg).  · Mild pulmonic valve regurgitation is present.  · There is no evidence of pericardial effusion.          I personally viewed and interpreted the patient's LAB data         Assessment:     1. Atrial fibrillation,with rvr    2. Advanced age    3. Chronic anticoagulation with low-dose Eliquis    4. Chronic renal failure, stage 2 (mild)    5. Persistent atrial fibrillation (CMS/HCC)    6. Nonrheumatic aortic valve stenosis    7. Other specified hypothyroidism          Plan:     Patient has moderate to severe aortic stenosis and LV systolic dysfunction with ejection fraction around 35%  Persistent atrial fibrillation, heart rate is better around 84/min  Unfortunately patient has discontinued Eliquis    Patient was advised to take Toprol-XL 50 mg daily  He will restart Eliquis 2.5 twice daily (low-dose due to renal failure and age)  He was also advised to resume his Synthroid  I feel the rate control is his best option.  Will also consult Dr. Arias.  Will also repeat echocardiogram in 6 months for aortic  stenosis.          No Follow-up on file.

## 2019-09-13 NOTE — PROGRESS NOTES
[facility]  INITIAL OFFICE CONSULT      Hugo Colon  2/28/1930  PCP: Deysi Collier APRN    SUBJECTIVE:   Hugo Colon is a 89 y.o. male seen for a consultation visit regarding the following:     Chief Complaint:   Chief Complaint   Patient presents with   • Atrial Fibrillation     Consult           Consultation is requested by Samantha Iniguez, * for evaluation of Atrial Fibrillation (Consult )    History:  The patient is an 89 year old WM with history of HTN, previously not on any medication who was incidentally found to have rapid atrial fibrillation, asymptomatic, in May 2019. He was placed on Eliquis 2.5 mg BID and Toprol XL with goal of rate control. His echocardiogram unfortunately showed EF 35% and moderate to severe AS. He is referred to EP services today for further opinion on rate control vs rhythm control of his atrial fibrillation. He is asymptomatic from his atrial fibrillation with no symptoms of palpitations. He does have very mild SOB and fatigue, that has been ongoing for over a year, when he walks at the mall. He states he does not feel limited and is able to complete his walk. He denies edema, orthopnea, PND. His BPs have been in the 120/80 range on average. His HRs have been in the 120s at home. He is scheduled to see Dr. Iniguez again on 9/23/19. He is supposed to have another echocardiogram again in the next few months to reassess his EF and valve per Dr. Iniguez's last note.       Cardiac PMH: (Old records have been reviewed and summarized below)  1. Persistent Atrial Fibrillation   A)Diagnosis 5/19   B)Toprol started 6/19   C)Asymptomatic   D) CHADSVasc = 3 on Eliquis 2.5 mg BID  2. Cardiomyopathy/VHD   A)Echocardiogram 7/16/19 EF 35% Moderate to Severe AS and MR.   3. VHD   A) Echocardiogram 7/16/19: EF 35%, moderate to severe AS.  4. HTN  5. PVD   A) history right carotid endarterectomy  6. Previous tobacco abuse - quit 1963  7. Surgical  History:   A) hemorrhoidectomy   B) hernia repair    Past Medical History, Past Surgical History, Family history, Social History, and Medications were all reviewed with the patient today and updated as necessary.     Current Outpatient Medications   Medication Sig Dispense Refill   • apixaban (ELIQUIS) 2.5 MG tablet tablet Take 1 tablet by mouth Every 12 (Twelve) Hours. 180 tablet 1   • metoprolol succinate XL (TOPROL-XL) 50 MG 24 hr tablet Take 1 tablet by mouth Daily. 90 tablet 0   • Multiple Vitamins-Minerals (MULTIVITAMIN ADULT PO) Take  by mouth Daily.       No current facility-administered medications for this visit.      Allergies   Allergen Reactions   • Levofloxacin    • Penicillins Other (See Comments)     Made arms blue         Past Medical History:   Diagnosis Date   • Hyperlipidemia    • Hypertension      Past Surgical History:   Procedure Laterality Date   • EXTERNAL EAR SURGERY      cancer   • HEMORRHOIDECTOMY     • HERNIA REPAIR       Family History   Problem Relation Age of Onset   • Hypertension Sister    • Hyperlipidemia Sister    • Hyperlipidemia Brother    • Hypertension Brother      Social History     Tobacco Use   • Smoking status: Former Smoker     Packs/day: 1.00     Years: 17.00     Pack years: 17.00     Types: Cigarettes     Start date:      Last attempt to quit: 1963     Years since quittin.7   • Smokeless tobacco: Never Used   Substance Use Topics   • Alcohol use: No       ROS:  Review of Symptoms:  General: no recent weight loss/gain, weakness + fatigue  Skin: no rashes, lumps, or other skin changes  HEENT: no dizziness, lightheadedness, or vision changes  Respiratory: no cough or hemoptysis  Cardiovascular: - palpitations, + tachycardia  Gastrointestinal: no black/tarry stools or diarrhea  Urinary: no change in frequency or urgency  Peripheral Vascular: no claudication or leg cramps  Musculoskeletal: no muscle or joint pain/stiffness  Psychiatric: no depression or excessive  "stress  Neurological: no sensory or motor loss, no syncope  Hematologic: no anemia, easy bruising or bleeding  Endocrine: no thyroid problems, nor heat or cold intolerance         PHYSICAL EXAM:   /74 (BP Location: Left arm, Patient Position: Sitting)   Pulse (!) 128   Ht 170.2 cm (67\")   Wt 70.1 kg (154 lb 9.6 oz)   BMI 24.21 kg/m²      Wt Readings from Last 5 Encounters:   09/13/19 70.1 kg (154 lb 9.6 oz)   07/29/19 70.8 kg (156 lb)   07/01/19 71.2 kg (157 lb)   05/30/19 72.6 kg (160 lb)   05/16/19 72.6 kg (160 lb)     BP Readings from Last 5 Encounters:   09/13/19 122/74   07/29/19 126/70   07/01/19 128/74   05/30/19 140/74   05/16/19 126/72       General-Well Nourished, Well developed. NAD  Eyes - PERRLA  Neck- supple, No mass  CV-IRIR, Tachycardic, no RG + AS murmur  Lung- clear bilaterally  Abd- soft, +BS  Musc/skel - Norm strength and range of motion  Skin- warm and dry  Neuro - Alert & Oriented x 3, appropriate mood.    Medical problems and test results were reviewed with the patient today.         Lab Results   Component Value Date    CHOL 135 05/16/2019    HDL 40 05/16/2019    LDL 83 05/16/2019    VLDL 12.4 05/16/2019       EKG:  (EKG/Tracing has been independently visualized by me and summarized below)      ECG 12 Lead  Date/Time: 9/13/2019 11:23 AM  Performed by: Rossana Harris PA  Authorized by: Rossana Harris PA   Comparison: compared with previous ECG from 7/1/2019  Similar to previous ECG  Rhythm: atrial fibrillation  BPM: 128              ASSESSMENT   1. Persistent Afib of unknown duration with RVR diagnosed 5/19.  Toprol XL added 6/19 for rate control but still not adequately rate controlled.  2.  Aortic valve stenosis and MR/Cardiomyopathy EF 35% by Echo 7/19.   3. CKD: Stage II.   4. Anticoagulation:  Chadsvasc=3, Eliquis 2.5mg BID      PLAN  1. Needs aggressive rate control at this time to allow for improvement in EF. Agree with rate control rather than ECV/AAD as the patient is " asymptomatic and has evidence of CKD, limiting AAD options. If rate control is not effective and EF does not improve, would most likely need an AVN ablation and BiV PM implant.   - will discontinue Toprol XL and replace with Lopressor 100 mg BID for better rate control.  - Recommend echocardiogram in the next 3 months for further assessment of EF  - Hold off on adding ACE or Entresto at this time due to CKD and focus on rate control  2. AS:  - per Dr. Iniguez, recheck EF  3. Watch Cr closely  4. Continue Eliquis 2.5 mg BID      Follow up in 3 months    Electronically signed by LUIS MIGUEL Best, 09/13/19, 11:27 AM.

## 2019-09-24 NOTE — TELEPHONE ENCOUNTER
PT DAUGHTER CALLED THE MEDICINE HAS BEEN SENT TO THE WRONG PHARMACY AGAIN. SHE SAID SHE TOLD THE NURSE TO SEND IT TO [facility] IN [place].   PLEASE SEND THE MEDS THAT WAS SENT FOR REFILLS ON HIS LAST VISIT TO [facility] IN [place] AND TAKE [facility] OFF. THEY DO NOT USE THAT PHARMACY

## 2019-10-29 NOTE — TELEPHONE ENCOUNTER
"FYI:  Patient called and asked to see Dr Iniguez today. When I asked him what was going on he said his heart, I asked him again and he said \" Dr Iniguez is my heart doctor.\". I asked him again what was going on with his heart, he said his heart and his breathing. I spoke with Dr Iniguez, he send him to the ER they can do the tests to check his heart immediately. Spoke to pt and to him to go straight to the ER per Dr Iniguez.  "

## 2019-11-03 PROBLEM — I48.91 ATRIAL FIBRILLATION (HCC): Status: ACTIVE | Noted: 2019-01-01

## 2019-11-03 NOTE — PROGRESS NOTES
Discharge Planning Assessment   [place]     Patient Name: Hugo Colon  MRN: [id number]  Today's Date: 11/3/2019    Admit Date: 11/3/2019    Discharge Needs Assessment     Row Name 11/03/19 1340       Living Environment    Lives With  alone    Current Living Arrangements  home/apartment/condo    Primary Care Provided by  self    Quality of Family Relationships  unable to assess    Able to Return to Prior Arrangements  yes       Resource/Environmental Concerns    Resource/Environmental Concerns  none       Transition Planning    Patient/Family Anticipates Transition to  home with family    Patient/Family Anticipated Services at Transition  none    Transportation Anticipated  family or friend will provide       Discharge Needs Assessment    Readmission Within the Last 30 Days  no previous admission in last 30 days    Concerns to be Addressed  no discharge needs identified;denies needs/concerns at this time    Equipment Currently Used at Home  cane, straight    Anticipated Changes Related to Illness  none    Equipment Needed After Discharge  none        Discharge Plan     Row Name 11/03/19 1051       Plan    Plan  PT STATES HE LIVES ALONE AND PLANS TO RETURN HOME UPON DISCHARGE, STATES HIS SON IN LAW ARTHUR MCDONALD CAN PROVIDE TRANSPORTATION. PCP IS NASRA ESCALERA, HE USES [facility] AND HAS [facility]. PT DENIES ANY ISSUES WITH MEDS OR COPAYS. PT DOES NOT HAVE A POA OR LIVING WILL. PT IS INDEPENDENT WITH CARE AND DOES NOT USE HOME O2 OR HOME HEALTH. STATES HE HAS A CANE TO USE IF NEEDED PRN. NO NEEDS IDENTIFED AT THIS TIME.     Patient/Family in Agreement with Plan  yes        Destination      No service coordination in this encounter.      Durable Medical Equipment      No service coordination in this encounter.      Dialysis/Infusion      No service coordination in this encounter.      Home Medical Care      No service coordination in this encounter.      Therapy      No service  coordination in this encounter.      [facility] Resources      No service coordination in this encounter.          Demographic Summary     Row Name 11/03/19 1340       General Information    Admission Type  inpatient    Arrived From  home    Referral Source  emergency department    Reason for Consult  discharge planning    Preferred Language  English        Functional Status     Row Name 11/03/19 1340       Functional Status    Usual Activity Tolerance  fair    Current Activity Tolerance  fair       Mental Status    General Appearance WDL  WDL        Psychosocial     Row Name 11/03/19 1340       Speech WDL    Speech WDL  WDL       Perceptual State WDL    Perceptual State WDL  WDL       Thought Process WDL    Thought Process WDL  WDL        Abuse/Neglect    No documentation.       Legal    No documentation.       Substance Abuse    No documentation.       Patient Forms    No documentation.           Leonarda Nascimento RN

## 2019-11-03 NOTE — ED PROVIDER NOTES
Subjective   Patient is an 89-year-old male with a history of chronic atrial fibrillation.  He presents with a one-week history of worsening shortness of breath, especially dyspnea on exertion.  He denies any chest pain, has not been feeling any palpitations.  He states that prior to coming here, when he took off his pajama bottoms he got a bleeding from a left lateral thigh varicose vein.  EMS placed a dressing with hemostasis.  Patient states that he has had his morning medications today.  He denies other symptoms or complaints.            Review of Systems   Constitutional: Negative for chills, diaphoresis and fever.   HENT: Negative for ear pain, sore throat and trouble swallowing.    Eyes: Negative for photophobia and pain.   Respiratory: Positive for shortness of breath. Negative for cough.    Cardiovascular: Negative for chest pain and palpitations.   Gastrointestinal: Negative for abdominal distention, abdominal pain, blood in stool, diarrhea, nausea and vomiting.   Endocrine: Negative for polydipsia and polyphagia.   Genitourinary: Negative for difficulty urinating and flank pain.   Musculoskeletal: Negative for back pain, neck pain and neck stiffness.   Skin: Negative for color change and pallor.   Neurological: Negative for seizures, syncope and speech difficulty.   Psychiatric/Behavioral: Negative for confusion.   All other systems reviewed and are negative.      Past Medical History:   Diagnosis Date   • Atrial fibrillation (CMS/HCC)    • Hyperlipidemia    • Hypertension        Allergies   Allergen Reactions   • Levofloxacin    • Penicillins Other (See Comments)     Made arms blue       Past Surgical History:   Procedure Laterality Date   • EXTERNAL EAR SURGERY      cancer   • HEMORRHOIDECTOMY     • HERNIA REPAIR         Family History   Problem Relation Age of Onset   • Hypertension Sister    • Hyperlipidemia Sister    • Hyperlipidemia Brother    • Hypertension Brother        Social History      Socioeconomic History   • Marital status:      Spouse name: Not on file   • Number of children: Not on file   • Years of education: Not on file   • Highest education level: Not on file   Tobacco Use   • Smoking status: Former Smoker     Packs/day: 1.00     Years: 17.00     Pack years: 17.00     Types: Cigarettes     Start date:      Last attempt to quit:      Years since quittin.8   • Smokeless tobacco: Never Used   Substance and Sexual Activity   • Alcohol use: No   • Drug use: No   • Sexual activity: Defer     Comment: , has step children           Objective   Physical Exam   Constitutional: He is oriented to person, place, and time. He appears well-developed and well-nourished.  Non-toxic appearance. He does not appear ill. No distress.   HENT:   Head: Normocephalic and atraumatic.   Eyes: EOM are normal. Pupils are equal, round, and reactive to light. No scleral icterus.   Neck: Normal range of motion. Neck supple. No neck rigidity. No tracheal deviation present.   Cardiovascular: Normal rate, regular rhythm and intact distal pulses.   Pulmonary/Chest: Effort normal. No respiratory distress. He has rales. He exhibits no tenderness.   Right basilar rales are heard posteriorly.  Lungs are otherwise clear.  Respirations are not labored.   Abdominal: Soft. Bowel sounds are normal. There is no tenderness. There is no rebound and no guarding.   Musculoskeletal: Normal range of motion. He exhibits no tenderness.   There is mild bilateral pedal and pretibial edema.  Left lateral distal thigh has a small varicose vein that shows evidence of recent bleeding through a small punctate opening.  This is not bleeding currently.   Neurological: He is alert and oriented to person, place, and time. He has normal strength. No sensory deficit. He exhibits normal muscle tone. Coordination normal. GCS eye subscore is 4. GCS verbal subscore is 5. GCS motor subscore is 6.   Skin: Skin is warm and dry.  Capillary refill takes less than 2 seconds. No cyanosis. No pallor.   Psychiatric: He has a normal mood and affect. His behavior is normal.   Nursing note and vitals reviewed.      Procedures  EKG shows atrial fibrillation with ventricular rate of 122.  Nonspecific ST T abnormalities.  No apparent acute ischemia.  XR Chest 1 View   Final Result   Radiographic changes of congestive failure.       This report was finalized on 11/3/2019 1:07 PM by Dr. Keith Loera MD.            Results for orders placed or performed during the hospital encounter of 11/03/19   Comprehensive Metabolic Panel   Result Value Ref Range    Glucose 145 (H) 65 - 99 mg/dL    BUN 36 (H) 8 - 23 mg/dL    Creatinine 1.45 (H) 0.76 - 1.27 mg/dL    Sodium 141 136 - 145 mmol/L    Potassium 5.7 (H) 3.5 - 5.2 mmol/L    Chloride 108 (H) 98 - 107 mmol/L    CO2 24.7 22.0 - 29.0 mmol/L    Calcium 8.9 8.6 - 10.5 mg/dL    Total Protein 5.9 (L) 6.0 - 8.5 g/dL    Albumin 3.51 3.50 - 5.20 g/dL    ALT (SGPT) 27 1 - 41 U/L    AST (SGOT) 27 1 - 40 U/L    Alkaline Phosphatase 68 39 - 117 U/L    Total Bilirubin 1.0 0.2 - 1.2 mg/dL    eGFR Non African Amer 46 (L) >60 mL/min/1.73    Globulin 2.4 gm/dL    A/G Ratio 1.5 g/dL    BUN/Creatinine Ratio 24.8 7.0 - 25.0    Anion Gap 8.3 5.0 - 15.0 mmol/L   Protime-INR   Result Value Ref Range    Protime 17.6 (H) 11.0 - 15.4 Seconds    INR 1.37 (H) 0.90 - 1.10   aPTT   Result Value Ref Range    PTT 22.0 (L) 23.8 - 36.1 seconds   Troponin   Result Value Ref Range    Troponin T <0.010 0.000 - 0.030 ng/mL   BNP   Result Value Ref Range    proBNP 5,350.0 (H) 5.0-1,800.0 pg/mL   T4, Free   Result Value Ref Range    Free T4 1.23 0.93 - 1.70 ng/dL   TSH   Result Value Ref Range    TSH 6.790 (H) 0.270 - 4.200 uIU/mL   Magnesium   Result Value Ref Range    Magnesium 2.2 1.6 - 2.4 mg/dL   CBC Auto Differential   Result Value Ref Range    WBC 10.81 (H) 3.40 - 10.80 10*3/mm3    RBC 4.38 4.14 - 5.80 10*6/mm3    Hemoglobin 13.5 13.0 - 17.7  g/dL    Hematocrit 42.1 37.5 - 51.0 %    MCV 96.1 79.0 - 97.0 fL    MCH 30.8 26.6 - 33.0 pg    MCHC 32.1 31.5 - 35.7 g/dL    RDW 14.1 12.3 - 15.4 %    RDW-SD 49.9 37.0 - 54.0 fl    MPV 11.2 6.0 - 12.0 fL    Platelets 143 140 - 450 10*3/mm3    Neutrophil % 78.5 (H) 42.7 - 76.0 %    Lymphocyte % 9.3 (L) 19.6 - 45.3 %    Monocyte % 9.4 5.0 - 12.0 %    Eosinophil % 1.1 0.3 - 6.2 %    Basophil % 1.1 0.0 - 1.5 %    Immature Grans % 0.6 (H) 0.0 - 0.5 %    Neutrophils, Absolute 8.49 (H) 1.70 - 7.00 10*3/mm3    Lymphocytes, Absolute 1.00 0.70 - 3.10 10*3/mm3    Monocytes, Absolute 1.02 (H) 0.10 - 0.90 10*3/mm3    Eosinophils, Absolute 0.12 0.00 - 0.40 10*3/mm3    Basophils, Absolute 0.12 0.00 - 0.20 10*3/mm3    Immature Grans, Absolute 0.06 (H) 0.00 - 0.05 10*3/mm3    nRBC 0.0 0.0 - 0.2 /100 WBC   Potassium   Result Value Ref Range    Potassium 4.4 3.5 - 5.2 mmol/L   Troponin   Result Value Ref Range    Troponin T <0.010 0.000 - 0.030 ng/mL   Urinalysis With Culture If Indicated - Urine, Random Void   Result Value Ref Range    Color, UA Yellow Yellow, Straw    Appearance, UA Clear Clear    pH, UA 5.5 5.0 - 8.0    Specific Gravity, UA 1.008 1.005 - 1.030    Glucose, UA Negative Negative    Ketones, UA Negative Negative    Bilirubin, UA Negative Negative    Blood, UA Negative Negative    Protein, UA Negative Negative    Leuk Esterase, UA Negative Negative    Nitrite, UA Negative Negative    Urobilinogen, UA 0.2 E.U./dL 0.2 - 1.0 E.U./dL   Light Blue Top   Result Value Ref Range    Extra Tube hold for add-on    Green Top (Gel)   Result Value Ref Range    Extra Tube Hold for add-ons.    Lavender Top   Result Value Ref Range    Extra Tube hold for add-on    Gold Top - SST   Result Value Ref Range    Extra Tube Hold for add-ons.                 ED Course  ED Course as of Nov 03 1642   Sun Nov 03, 2019   1312 Hospitalist paged.  [CM]   0191 Patient's emergency department stay has not been difficult.  Upon patient's arrival here the  left leg varicosity was not bleeding, but the bleeding did return.  Currently a dressing is in place and there is no active bleeding.  I discussed the case with Dr. Hwang.  He advises IV Cardizem.  He is admitting patient under the hospitalist service for further care.  [CM]      ED Course User Index  [CM] Agus Trevizo MD                  [facility]    Final diagnoses:   Atrial fibrillation with rapid ventricular response (CMS/HCC)   Acute on chronic systolic congestive heart failure (CMS/HCC)   Bleeding from varicose vein               Please note that portions of this note were completed with a voice recognition program.        Agus Trevizo MD  11/03/19 7452

## 2019-11-03 NOTE — CONSULTS
Cardiology  CONSULT NOTE    Consults    Patient Identification:  Name:  Hugo Colon  Age:  89 y.o.  Sex:  male  :  1930  MRN:  [id number]  Visit Number:  [id number]  Primary care provider:  Deysi Collier APRN    Subjective     Referring provider- Ms.Karina JULI Mortensen    Reason for the consult:  A. fib with RVR    Chief complaints:  Bleeding in the left leg      History of presenting illness:    89-year-old male was brought to ED in an ambulance because of history of bleed from a small wound in the lateral aspect of lower part of left eye which has been stopped now.    He has history of dyspnea on exertion-functional class I-II.  He reported that he could walk 1 mile at a slow pace without any difficulty.  He has history of intermittent leg edema.  proBNP level is elevated but chest x-ray showed no evidence of CHF.  Small bilateral pleural effusion was present and patient received Lasix x1 IV in ER.  Patient has history of CKD.    He has history of persistent A. fib diagnosed 2019.  Patient was seen by a physician assistant belonged to a EP specialist, Dr. Antony.  Recommended Lopressor 100 mg p.o. twice daily.  Toprol-XL 50 mg was stopped.  Patient has been anticoagulated on Eliquis 2.5 mg p.o. twice daily.    Patient has history of moderate to severe calcific left ear.  LVEF was 35%.  Echocardiogram done 2019.  No history of known CAD.    He denied history of hypertension, DM, lipid disorder or smoking.      --------------------------------------------------------------------------------------------------------------------  Review of Systems:    Constitutional- fatigue   ENT-none  Cardiovascular-as above  Respiratory-none  Integumentary-bleeding from left thigh-stopped  Other organ system involvement-negative    ---------------------------------------------------------------------------------------------------------------------   Past History:  Family History   Problem Relation Age  of Onset   • Hypertension Sister    • Hyperlipidemia Sister    • Hyperlipidemia Brother    • Hypertension Brother      Past Medical History:   Diagnosis Date   • Atrial fibrillation (CMS/HCC)    • Hyperlipidemia    • Hypertension      Past Surgical History:   Procedure Laterality Date   • EXTERNAL EAR SURGERY      cancer   • HEMORRHOIDECTOMY     • HERNIA REPAIR       Social History     Socioeconomic History   • Marital status:      Spouse name: Not on file   • Number of children: Not on file   • Years of education: Not on file   • Highest education level: Not on file   Tobacco Use   • Smoking status: Former Smoker     Packs/day: 1.00     Years: 17.00     Pack years: 17.00     Types: Cigarettes     Start date:      Last attempt to quit:      Years since quittin.8   • Smokeless tobacco: Never Used   Substance and Sexual Activity   • Alcohol use: No   • Drug use: No   • Sexual activity: Defer     Comment: derrell, has step children     ---------------------------------------------------------------------------------------------------------------------   Allergies:  Levofloxacin and Penicillins  ---------------------------------------------------------------------------------------------------------------------   Prior to Admission Medications     Prescriptions Last Dose Informant Patient Reported? Taking?    apixaban (ELIQUIS) 2.5 MG tablet tablet Past Week Self No Yes    Take 1 tablet by mouth Every 12 (Twelve) Hours.    metoprolol tartrate (LOPRESSOR) 50 MG tablet Past Week Self No Yes    Take 1 tablet by mouth 2 (Two) Times a Day.    Multiple Vitamins-Minerals (MULTIVITAMIN ADULT PO) Past Week Self Yes Yes    Take 1 tablet by mouth Daily.        Hospital Meds:    apixaban 2.5 mg Oral Q12H   influenza vaccine 0.5 mL Intramuscular Once   metoprolol succinate  mg Oral Q24H   sodium chloride 10 mL Intravenous Q12H       diltiaZEM 5-15 mg/hr Last Rate: 5 mg/hr (19 4240)      ---------------------------------------------------------------------------------------------------------------------     Objective     Vital Signs:  Temp:  [97.6 °F (36.4 °C)] 97.6 °F (36.4 °C)  Heart Rate:  [107-129] 123  Resp:  [15-20] 16  BP: (109-126)/(83-98) 116/96      11/03/19  1213 11/03/19  1418   Weight: 70.3 kg (155 lb) 69.4 kg (153 lb 1.6 oz)     Body mass index is 23.98 kg/m².  ---------------------------------------------------------------------------------------------------------------------   Physical exam:  General Appearance:    Alert, cooperative, in no acute distress   Head:    Normocephalic, without obvious abnormality, atraumatic   Eyes:            Lids and lashes normal, conjunctivae and sclerae normal, no   icterus, no pallor, corneas clear, PERRLA   Ears:    Ears appear intact with no abnormalities noted   Throat:   No oral lesions, no thrush, oral mucosa moist   Neck:   No adenopathy, supple, trachea midline, no thyromegaly, no   carotid bruit, no JVD   Back:     No kyphosis present, no scoliosis present, no skin lesions,      erythema or scars, no tenderness to percussion or                   palpation,   range of motion normal   Lungs:     Clear to auscultation,respirations regular, even and                  unlabored    Heart:   Tachycardia.  Irregular rhythm.  There was a grade 2 x 6 ejection systolic murmur heard in the base of the precordium with no radiation   Chest Wall:    No abnormalities observed   Abdomen:     Normal bowel sounds, no masses, no organomegaly, soft        non-tender, non-distended, no guarding, no rebound                tenderness   Rectal:     Deferred   Extremities:  1-2+ bilateral pedal edema-pitting.  The bleeding site in the lateral aspect of lower part of left thigh has been dressed.   Pulses:   Pulses palpable and equal bilaterally   Skin:   No bleeding, bruising or rash       Neurologic:   Cranial nerves 2 - 12 grossly intact, sensation intact, DTR        present and equal bilaterally         Telemetry:  SAMAN lea.  Regular rate-100s    ---------------------------------------------------------------------------------------------------------------------   EKG:   SAMAN lea with RVR    Echocardiogram 5/2019-    Interpretation Summary     · Normal left ventricular cavity size and wall thickness noted. There is left ventricular global hypokinesis  · Left ventricular systolic function is moderately decreased. Estimated EF appears to be around 35%.  · There is moderate calcification of the aortic valve, Mild aortic valve regurgitation is present. Severe aortic valve stenosis is noted.Ao max PG 25.2 mmHg, Ao mean PG 14.4 mmHg, JOSE(I,D) 1.14 cm^2  · Mild-to-moderate mitral valve regurgitation is present  · Mild tricuspid valve regurgitation is present. Mild pulmonary hypertension is present,(35-45 mmHg).  · Mild pulmonic valve regurgitation is present.  · There is no evidence of pericardial effusion.          I   ---------------------------------------------------------------------------------------------------------------------   Results from last 7 days   Lab Units 11/03/19  1229   WBC 10*3/mm3 10.81*   HEMOGLOBIN g/dL 13.5   HEMATOCRIT % 42.1   MCV fL 96.1   MCHC g/dL 32.1   PLATELETS 10*3/mm3 143   INR  1.37*         Results from last 7 days   Lab Units 11/03/19  1526 11/03/19  1229   SODIUM mmol/L  --  141   POTASSIUM mmol/L 4.4 5.7*   MAGNESIUM mg/dL  --  2.2   CHLORIDE mmol/L  --  108*   CO2 mmol/L  --  24.7   BUN mg/dL  --  36*   CREATININE mg/dL  --  1.45*   EGFR IF NONAFRICN AM mL/min/1.73  --  46*   CALCIUM mg/dL  --  8.9   GLUCOSE mg/dL  --  145*   ALBUMIN g/dL  --  3.51   BILIRUBIN mg/dL  --  1.0   ALK PHOS U/L  --  68   AST (SGOT) U/L  --  27   ALT (SGPT) U/L  --  27   Estimated Creatinine Clearance: 33.9 mL/min (A) (by C-G formula based on SCr of 1.45 mg/dL (H)).  No results found for: AMMONIA  Results from last 7 days   Lab Units 11/03/19  1448 11/03/19  7076    TROPONIN T ng/mL <0.010 <0.010     Results from last 7 days   Lab Units 11/03/19  1229   PROBNP pg/mL 5,350.0*     No results found for: HGBA1C  Lab Results   Component Value Date    TSH 6.790 (H) 11/03/2019    FREET4 1.23 11/03/2019     No results found for: PREGTESTUR, PREGSERUM, HCG, HCGQUANT  Pain Management Panel     There is no flowsheet data to display.                        ---------------------------------------------------------------------------------------------------------------------   Imaging Results (Last 7 Days)     Procedure Component Value Units Date/Time    XR Chest 1 View [[id number]] Collected:  11/03/19 1307     Updated:  11/03/19 1309    Narrative:       Technique: Frontal view the chest.     COMPARISON:  None     INDICATION:     soa      FINDINGS:    Bibasilar atelectasis. Cardiomegaly is noted. Prominent  interstitial markings are noted. There are small bilateral pleural  effusions.        Impression:       Radiographic changes of congestive failure.     This report was finalized on 11/3/2019 1:07 PM by Dr. Keith Loera MD.             I have personally reviewed the radiology images and read the final radiology report.        Assessment      1.  Persistent A. fib with RVR  2.  Anticoagulated on Eliquis  3.  Moderate calcified aortic stenosis  4.  Cardiomyopathy with EF-35% compensated   5.  Chronic kidney disease/hyperkalemia  6.  Minor left lower extremity bleed- stopped    Recommendations     1.  Metoprolol tartrate has been changed to metoprolol succinate  mg p.o. daily because of reduced EF for the control of ventricular rate.  Meanwhile, will continue IV Cardizem infusion for now.  2.  For reduced EF, continue metoprolol succinate.  Not a candidate for ACE inhibitor or Entresto and spironolactone because of chronic renal failure and hyperkalemia  3.  As CHF is clinically and radiologically compensated, no need of diuretics.  4.  Echocardiogram has been ordered to reassess LVEF  and severity of aortic stenosis  5.  I discussed my recommendations with the patient.    Thank you for the opportunity to participate in the care of your patient. Please do not hesitate to call with any questions or concerns.     Nadja Doan MD, FACC  11/03/19  4:03 PM

## 2019-11-03 NOTE — ED NOTES
Cleaned pt's L leg with chlorhexdine at this time bleeding noted to varicose vein on side of left knee pressure dressing applied. Will continue to monitor.      Kayy Herring RN  11/03/19 0906

## 2019-11-03 NOTE — PLAN OF CARE
Problem: Patient Care Overview  Goal: Plan of Care Review  Outcome: Ongoing (interventions implemented as appropriate)    Goal: Individualization and Mutuality  Outcome: Ongoing (interventions implemented as appropriate)    Goal: Discharge Needs Assessment  Outcome: Ongoing (interventions implemented as appropriate)    Goal: Interprofessional Rounds/Family Conf  Outcome: Ongoing (interventions implemented as appropriate)      Problem: Arrhythmia/Dysrhythmia (Symptomatic) (Adult)  Goal: Signs and Symptoms of Listed Potential Problems Will be Absent, Minimized or Managed (Arrhythmia/Dysrhythmia)  Outcome: Ongoing (interventions implemented as appropriate)      Problem: Fall Risk (Adult)  Goal: Identify Related Risk Factors and Signs and Symptoms  Outcome: Ongoing (interventions implemented as appropriate)    Goal: Absence of Fall  Outcome: Ongoing (interventions implemented as appropriate)      Problem: Skin Injury Risk (Adult)  Goal: Identify Related Risk Factors and Signs and Symptoms  Outcome: Ongoing (interventions implemented as appropriate)    Goal: Skin Health and Integrity  Outcome: Ongoing (interventions implemented as appropriate)

## 2019-11-04 NOTE — PROGRESS NOTES
[facility] HOSPITALIST PROGRESS NOTE     Patient Identification:  Name:  Hugo Colon  Age:  89 y.o.  Sex:  male  :  1930  MRN:  [id number]  Visit Number:  [id number]  Primary Care Provider:  Deysi Collier APRN    Length of stay:  1    Chief complaint: Follow-up for A. fib with RVR        Subjective:    Patient seen with nursing staff.  No family was present at bedside.  Patient states that he is doing better.  He denies any new complaints.  Patient states that his breathing is improved since yesterday.  He was able to ambulate without any shortness of breath.  Patient denies any chest pain or palpitations.  He denies any dizziness or lightheadedness.         Current Hospital Meds:    apixaban 2.5 mg Oral Q12H   furosemide 20 mg Intravenous Q12H   metoprolol succinate  mg Oral Q24H   multivitamin with minerals 1 tablet Oral Daily   sodium chloride 10 mL Intravenous Q12H       diltiaZEM 5-15 mg/hr Last Rate: 5 mg/hr (19)     ----------------------------------------------------------------------------------------------------------------------  Vital Signs:  Temp:  [97.2 °F (36.2 °C)-97.8 °F (36.6 °C)] 97.6 °F (36.4 °C)  Heart Rate:  [] 110  Resp:  [1528] 20  BP: ()/(65-98) 100/86      19  1213 19  1418 19  0400   Weight: 70.3 kg (155 lb) 69.4 kg (153 lb 1.6 oz) 68.9 kg (152 lb)     Body mass index is 23.81 kg/m².    Intake/Output Summary (Last 24 hours) at 2019 09  Last data filed at 2019 0400  Gross per 24 hour   Intake --   Output 2550 ml   Net -2550 ml     Diet Regular; Cardiac  ----------------------------------------------------------------------------------------------------------------------  Physical exam:  Constitutional: Elderly male, lying in bed, no acute distress.  HENT:  Head:  Normocephalic and atraumatic.  Mouth:  Moist mucous membranes.    Pupils are equal, round, and reactive to light.   Neck:  Neck supple.  No  JVD present.    Cardiovascular: Irregularly irregular rhythm.  S1+S2. No murmur, rubs or gallops.   Pulmonary/Chest: Clear to auscultation bilaterally.   Abdominal:  Soft. Non-tender. No viscera palpable.  Bowel sounds audible.   Musculoskeletal: No deformity or joint swelling.   Peripheral vascular: Bilateral dorsalis pedis palpable.  Minimal pitting edema bilaterally on ankles.  Neurological:  Alert and oriented to person, place, and time.  Cranial nerves grossly intact. Strength bilaterally symmetrical in upper and lower extremities.   Skin:  Skin is warm and dry. No rash noted. No pallor.   ----------------------------------------------------------------------------------------------------------------------  Tele: A. fib with heart rate fluctuating from .  ----------------------------------------------------------------------------------------------------------------------  Results from last 7 days   Lab Units 11/04/19  0218 11/03/19  1933 11/03/19  1448   TROPONIN T ng/mL 0.011 <0.010 <0.010     Results from last 7 days   Lab Units 11/04/19  0218 11/03/19  1229   WBC 10*3/mm3 10.10 10.81*   HEMOGLOBIN g/dL 13.5 13.5   HEMATOCRIT % 43.1 42.1   MCV fL 97.1* 96.1   MCHC g/dL 31.3* 32.1   PLATELETS 10*3/mm3 155 143   INR   --  1.37*         Results from last 7 days   Lab Units 11/04/19  0218 11/03/19  1526 11/03/19  1229   SODIUM mmol/L 141  --  141   POTASSIUM mmol/L 4.1 4.4 5.7*   MAGNESIUM mg/dL  --   --  2.2   CHLORIDE mmol/L 105  --  108*   CO2 mmol/L 23.9  --  24.7   BUN mg/dL 38*  --  36*   CREATININE mg/dL 1.56*  --  1.45*   EGFR IF NONAFRICN AM mL/min/1.73 42*  --  46*   CALCIUM mg/dL 9.0  --  8.9   GLUCOSE mg/dL 120*  --  145*   ALBUMIN g/dL 3.45*  --  3.51   BILIRUBIN mg/dL 0.9  --  1.0   ALK PHOS U/L 69  --  68   AST (SGOT) U/L 25  --  27   ALT (SGPT) U/L 25  --  27   Estimated Creatinine Clearance: 31.3 mL/min (A) (by C-G formula based on SCr of 1.56 mg/dL (H)).    No results found for: AMMONIA                     I have personally looked at the labs and they are summarized above.  ----------------------------------------------------------------------------------------------------------------------  Imaging Results (Last 24 Hours)     Procedure Component Value Units Date/Time    XR Chest 1 View [[id number]] Collected:  11/03/19 1307     Updated:  11/03/19 1309    Narrative:       Technique: Frontal view the chest.     COMPARISON:  None     INDICATION:     soa      FINDINGS:    Bibasilar atelectasis. Cardiomegaly is noted. Prominent  interstitial markings are noted. There are small bilateral pleural  effusions.        Impression:       Radiographic changes of congestive failure.     This report was finalized on 11/3/2019 1:07 PM by Dr. Keith Loera MD.           ----------------------------------------------------------------------------------------------------------------------  Assessment and Plan:    -Persistent A. Fib with RVR  Patient continues to remain on Cardizem drip.  Heart rate is improved since yesterday but still continues to fluctuate around 100.  Continue metoprolol and it has been changed to metoprolol succinate because of reduced ejection fraction.  Continue anticoagulation with Eliquis.  Transthoracic echocardiogram done and results pending.    Cardiology on board and appreciate help from Dr. Doan.    -Nonrheumatic aortic valve stenosis  Echocardiogram done and results pending to determine the severity of aortic stenosis.    -CKD stage III  Creatinine is up to 1.5 from 1.4 yesterday.  Continue to monitor renal function and avoid nephrotoxic agents.    -Cardiomyopathy, EF of 35% by echo on 7/19.  Repeat echocardiogram pending.  Continue beta-blocker.  Patient is not on ACE inhibitor, ARB or Entresto due to hyperkalemia and renal failure.    -Hyperkalemia  Potassium level improved and is within normal limits today.  Continue to monitor electrolytes.    -Elevated BNP  BNP remains elevated.   Troponins have been negative.  Repeat echocardiogram pending.    -Acute decompensation of chronic systolic CHF  Patient has diuresed well with Lasix since yesterday.  Creatinine has gone slightly up and will consider decreasing his Lasix.  Patient has diuresed 1.5 L since yesterday.  Continue to monitor intake and output.    Activity: Up with assist  Nutrition: Cardiac diet  Fluids: None  DVT prophylaxis: Anticoagulation with Eliquis  GI prophylaxis: Pepcid      The patient is high risk due to: A. fib with RVR, aortic stenosis, cardiomyopathy, chronic systolic CHF.    I discussed the patients findings and my recommendations with patient and nursing staff.    Abdifatah Hwang MD  11/04/19  9:29 AM

## 2019-11-04 NOTE — PROGRESS NOTES
"  Patient Identification:  Name:  Hugo Colon  Age:  89 y.o.  Sex:  male  :  1930  MRN:  [id number]  Visit Number:  [id number]  Primary care provider:  Deysi Collier APRN    Reason for follow-up:  Persistent A. fib with RVR    Subjective     Patient denied history of chest pain or palpitations.  Bleeding in the left thigh-stopped.  Telemetry-A. fib with ventricular rate-100s.  He is on IV Cardizem infusion 10 mg/h.      Medication Review:     apixaban 2.5 mg Oral Q12H   furosemide 20 mg Intravenous Q12H   metoprolol succinate  mg Oral Q24H   multivitamin with minerals 1 tablet Oral Daily   sodium chloride 10 mL Intravenous Q12H         Vital Sign Min/Max for last 24 hours  Temp  Min: 97.2 °F (36.2 °C)  Max: 97.8 °F (36.6 °C)   BP  Min: 86/68  Max: 126/96   Pulse  Min: 89  Max: 129   Resp  Min: 15  Max: 28   SpO2  Min: 90 %  Max: 100 %   No Data Recorded   Weight  Min: 68.9 kg (152 lb)  Max: 70.3 kg (155 lb)     Flowsheet Rows      First Filed Value   Admission Height  170.2 cm (67\") Documented at 2019 1213   Admission Weight  70.3 kg (155 lb) Documented at 2019 1213          Objective       Physical Exam:     General Appearance:    Alert, cooperative, in no acute distress   Head:    Normocephalic, without obvious abnormality, atraumatic   Eyes:            Lids and lashes normal, conjunctivae and sclerae normal, no   icterus, no pallor, corneas clear, PERRLA   Ears:    Ears appear intact with no abnormalities noted   Throat:   No oral lesions, no thrush, oral mucosa moist   Neck:   No adenopathy, supple, trachea midline, no thyromegaly, no   carotid bruit, no JVD   Back:     No kyphosis present, no scoliosis present, no skin lesions,      erythema or scars, no tenderness to percussion or                   palpation,   range of motion normal   Lungs:     Clear to auscultation,respirations regular, even and                  unlabored    Heart:   Tachycardia.  Irregular rhythm.  There " was a grade 2 x 6 ejection systolic murmur heard in the base of the precordium with no radiation   Chest Wall:    No abnormalities observed   Abdomen:     Normal bowel sounds, no masses, no organomegaly, soft        non-tender, non-distended, no guarding, no rebound                tenderness   Rectal:     Deferred   Extremities:  1-2+ bilateral pedal edema-pitting.  The bleeding site in the lateral aspect of lower part of left thigh has been dressed.   Pulses:   Pulses palpable and equal bilaterally          Telemetry:  A. fib.  Ventricular rate-100s          Labs  Results from last 7 days   Lab Units 11/04/19  0218 11/03/19  1229   WBC 10*3/mm3 10.10 10.81*   HEMOGLOBIN g/dL 13.5 13.5   HEMATOCRIT % 43.1 42.1   PLATELETS 10*3/mm3 155 143     Results from last 7 days   Lab Units 11/04/19  0218 11/03/19  1526 11/03/19  1229   SODIUM mmol/L 141  --  141   POTASSIUM mmol/L 4.1 4.4 5.7*   CHLORIDE mmol/L 105  --  108*   CO2 mmol/L 23.9  --  24.7   BUN mg/dL 38*  --  36*   CREATININE mg/dL 1.56*  --  1.45*   CALCIUM mg/dL 9.0  --  8.9   GLUCOSE mg/dL 120*  --  145*     Results from last 7 days   Lab Units 11/04/19  0218 11/03/19  1229   BILIRUBIN mg/dL 0.9 1.0   ALK PHOS U/L 69 68   AST (SGOT) U/L 25 27   ALT (SGPT) U/L 25 27     Results from last 7 days   Lab Units 11/03/19  1229   MAGNESIUM mg/dL 2.2     Results from last 7 days   Lab Units 11/03/19  1229   INR  1.37*         Results from last 7 days   Lab Units 11/04/19  0218 11/03/19  1933 11/03/19  1448   TROPONIN T ng/mL 0.011 <0.010 <0.010           Radiology: Xr Chest 1 View    Result Date: 11/3/2019  Radiographic changes of congestive failure.  This report was finalized on 11/3/2019 1:07 PM by Dr. Keith Loera MD.        Assessment      1.  Persistent A. fib with RVR  2.  Anticoagulated on Eliquis  3.  Moderate calcified aortic stenosis  4.  Cardiomyopathy with EF-35% compensated   5.  Chronic kidney disease/hyperkalemia  6.  Minor left lower extremity bleed-  stopped    Plan     1.  Continue metoprolol succinate  mg p.o. daily for ventricular rate control.  Continue IV Cardizem until the optimal effect of metoprolol is achieved.  2.  Continue Eliquis 2.5 mg p.o. twice daily  3.  Will review the echocardiographic study to assess the severity of aortic stenosis and LVEF  4.  Not a candidate for ACE inhibitor  or Entresto due to renal failure.      Nadja Doan MD [facility]  11/04/19  9:53 AM

## 2019-11-04 NOTE — PLAN OF CARE
Problem: Patient Care Overview  Goal: Plan of Care Review  Outcome: Ongoing (interventions implemented as appropriate)   11/04/19 0123   Plan of Care Review   Progress no change   Coping/Psychosocial   Plan of Care Reviewed With patient     Goal: Discharge Needs Assessment  Outcome: Ongoing (interventions implemented as appropriate)   11/03/19 1340 11/03/19 1419 11/03/19 1421   Discharge Needs Assessment   Readmission Within the Last 30 Days --  --  --    Concerns to be Addressed no discharge needs identified;denies needs/concerns at this time --  --    Patient/Family Anticipates Transition to --  --  home with family   Patient/Family Anticipated Services at Transition --  --  none   Transportation Anticipated --  --  family or friend will provide   Anticipated Changes Related to Illness none --  --    Equipment Needed After Discharge none --  --    Disability   Equipment Currently Used at Home --  cane, straight --     11/04/19 0123   Discharge Needs Assessment   Readmission Within the Last 30 Days no previous admission in last 30 days   Concerns to be Addressed --    Patient/Family Anticipates Transition to --    Patient/Family Anticipated Services at Transition --    Transportation Anticipated --    Anticipated Changes Related to Illness --    Equipment Needed After Discharge --    Disability   Equipment Currently Used at Home --        Problem: Arrhythmia/Dysrhythmia (Symptomatic) (Adult)  Goal: Signs and Symptoms of Listed Potential Problems Will be Absent, Minimized or Managed (Arrhythmia/Dysrhythmia)  Outcome: Ongoing (interventions implemented as appropriate)   11/04/19 0123   Goal/Outcome Evaluation   Problems Assessed (Arrhythmia/Dysrhythmia) all   Problems Present (Dysrhythmia) situational response       Problem: Fall Risk (Adult)  Goal: Absence of Fall  Outcome: Ongoing (interventions implemented as appropriate)   11/04/19 0123   Fall Risk (Adult)   Absence of Fall making progress toward outcome        Problem: Skin Injury Risk (Adult)  Goal: Skin Health and Integrity  Outcome: Ongoing (interventions implemented as appropriate)   11/04/19 0123   Skin Injury Risk (Adult)   Skin Health and Integrity making progress toward outcome

## 2019-11-04 NOTE — PHARMACY PATIENT ASSISTANCE
Pharmacy checked on the cost of home med Eliquis for this patient. Per his pharmacy, patient has a $0 copay. No issues noted at this time.    Thank you,  Gi Valenzuela RPh

## 2019-11-04 NOTE — PLAN OF CARE
Problem: Patient Care Overview  Goal: Plan of Care Review  Outcome: Ongoing (interventions implemented as appropriate)    Goal: Individualization and Mutuality  Outcome: Ongoing (interventions implemented as appropriate)    Goal: Discharge Needs Assessment  Outcome: Ongoing (interventions implemented as appropriate)      Problem: Arrhythmia/Dysrhythmia (Symptomatic) (Adult)  Goal: Signs and Symptoms of Listed Potential Problems Will be Absent, Minimized or Managed (Arrhythmia/Dysrhythmia)  Outcome: Ongoing (interventions implemented as appropriate)      Problem: Fall Risk (Adult)  Goal: Identify Related Risk Factors and Signs and Symptoms  Outcome: Ongoing (interventions implemented as appropriate)    Goal: Absence of Fall  Outcome: Ongoing (interventions implemented as appropriate)      Problem: Skin Injury Risk (Adult)  Goal: Identify Related Risk Factors and Signs and Symptoms  Outcome: Ongoing (interventions implemented as appropriate)    Goal: Skin Health and Integrity  Outcome: Ongoing (interventions implemented as appropriate)

## 2019-11-04 NOTE — PROGRESS NOTES
Discharge Planning Assessment   [place]     Patient Name: Hugo Colon  MRN: [id number]  Today's Date: 11/4/2019    Admit Date: 11/3/2019    Discharge Needs Assessment     Row Name 11/04/19 1050       Living Environment    Lives With  alone    Current Living Arrangements  home/apartment/condo    Primary Care Provided by  self;child(sam);other (see comments) [facility].    Provides Primary Care For  no one, unable/limited ability to care for self    Family Caregiver if Needed  child(sam), adult    Quality of Family Relationships  involved;helpful;supportive    Able to Return to Prior Arrangements  yes       Resource/Environmental Concerns    Transportation Concerns  car, none       Transition Planning    Patient/Family Anticipates Transition to  home;home with help/services    Transportation Anticipated  family or friend will provide       Discharge Needs Assessment    Equipment Currently Used at Home  cane, straight    Equipment Needed After Discharge  none        Discharge Plan     Row Name 11/04/19 1051       Plan    Plan  SS spoke with pt on this day. Pt lives at home alone, and receives [facility] services. Pt does not have a POA or a living will. Pt uses [facility], and his PCP is Deysi Collier. Pt plans to return home at discharge, with transportation provided by his daughter in law Unis.SS will follow and assist as needed.     Patient/Family in Agreement with Plan  yes          Demographic Summary     Row Name 11/04/19 1050       General Information    Admission Type  inpatient    Referral Source  nursing    Reason for Consult  discharge planning    Preferred Language  English     Used During This Interaction  no            JH Kendall

## 2019-11-05 NOTE — PROGRESS NOTES
[facility] HOSPITALIST PROGRESS NOTE     Patient Identification:  Name:  Hugo Colon  Age:  89 y.o.  Sex:  male  :  1930  MRN:  [id number]  Visit Number:  [id number]  Primary Care Provider:  Deysi Collier APRN    Length of stay:  2    Chief complaint: Follow-up for A. fib with RVR        Subjective:    Patient seen with nursing staff.  No family was present at bedside.  Patient states that he is doing better.  He denies any new complaints.  Patient states that his breathing is improved since yesterday.  Patient has good urine output.  Denies any complaints. Patient denies any chest pain or palpitations.  He denies any dizziness or lightheadedness.         Current Hospital Meds:    apixaban 2.5 mg Oral Q12H   furosemide 20 mg Intravenous Daily   metoprolol succinate  mg Oral Q24H   multivitamin with minerals 1 tablet Oral Daily   sodium chloride 10 mL Intravenous Q12H       diltiaZEM 5-15 mg/hr Last Rate: 5 mg/hr (19 1000)     ----------------------------------------------------------------------------------------------------------------------  Vital Signs:  Temp:  [97.1 °F (36.2 °C)-98 °F (36.7 °C)] 97.7 °F (36.5 °C)  Heart Rate:  [] 107  Resp:  [18-22] 18  BP: ()/() 122/93      19  1213 19  1418 19  0400   Weight: 70.3 kg (155 lb) 69.4 kg (153 lb 1.6 oz) 68.9 kg (152 lb)     Body mass index is 23.81 kg/m².    Intake/Output Summary (Last 24 hours) at 2019 1018  Last data filed at 2019 0800  Gross per 24 hour   Intake 2968 ml   Output 1250 ml   Net 1718 ml     Diet Regular; Cardiac  ----------------------------------------------------------------------------------------------------------------------  Physical exam:  Constitutional: Elderly male, lying in bed, no acute distress.  HENT:  Head:  Normocephalic and atraumatic.  Mouth:  Moist mucous membranes.    Pupils are equal, round, and reactive to light.   Neck:  Neck supple.   No JVD present.    Cardiovascular: Irregularly irregular rhythm.  S1+S2. No murmur, rubs or gallops.   Pulmonary/Chest: Clear to auscultation bilaterally.   Abdominal:  Soft. Non-tender. No viscera palpable.  Bowel sounds audible.   Musculoskeletal: No deformity or joint swelling.   Peripheral vascular: Bilateral dorsalis pedis palpable.  Minimal pitting edema bilaterally on ankles.  Neurological:  Alert and oriented to person, place, and time.  Cranial nerves grossly intact. Strength bilaterally symmetrical in upper and lower extremities.   Skin:  Skin is warm and dry. No rash noted. No pallor.   ----------------------------------------------------------------------------------------------------------------------  Tele: A. fib with heart rate fluctuating from .  ----------------------------------------------------------------------------------------------------------------------  Results from last 7 days   Lab Units 11/04/19  0218 11/03/19  1933 11/03/19  1448   TROPONIN T ng/mL 0.011 <0.010 <0.010     Results from last 7 days   Lab Units 11/04/19  0218 11/03/19  1229   WBC 10*3/mm3 10.10 10.81*   HEMOGLOBIN g/dL 13.5 13.5   HEMATOCRIT % 43.1 42.1   MCV fL 97.1* 96.1   MCHC g/dL 31.3* 32.1   PLATELETS 10*3/mm3 155 143   INR   --  1.37*         Results from last 7 days   Lab Units 11/04/19  0218 11/03/19  1526 11/03/19  1229   SODIUM mmol/L 141  --  141   POTASSIUM mmol/L 4.1 4.4 5.7*   MAGNESIUM mg/dL  --   --  2.2   CHLORIDE mmol/L 105  --  108*   CO2 mmol/L 23.9  --  24.7   BUN mg/dL 38*  --  36*   CREATININE mg/dL 1.56*  --  1.45*   EGFR IF NONAFRICN AM mL/min/1.73 42*  --  46*   CALCIUM mg/dL 9.0  --  8.9   GLUCOSE mg/dL 120*  --  145*   ALBUMIN g/dL 3.45*  --  3.51   BILIRUBIN mg/dL 0.9  --  1.0   ALK PHOS U/L 69  --  68   AST (SGOT) U/L 25  --  27   ALT (SGPT) U/L 25  --  27   Estimated Creatinine Clearance: 31.3 mL/min (A) (by C-G formula based on SCr of 1.56 mg/dL (H)).    No results found for:  AMMONIA                    I have personally looked at the labs and they are summarized above.  ----------------------------------------------------------------------------------------------------------------------  Imaging Results (Last 24 Hours)     ** No results found for the last 24 hours. **        ----------------------------------------------------------------------------------------------------------------------  Assessment and Plan:    -Persistent A. Fib with RVR  Patient continues to remain on Cardizem drip.  Patient still continues to have tachycardia and metoprolol has been increased 150 mg daily. Continue anticoagulation with Eliquis.    Echocardiogram showed moderately decreased LV SF, EF of 36 to 40%, moderate LV global hypokinesis, normal cardiac chambers, mild AR, moderate AS (peak gradient 28 mmHg), moderate MR, mild TR, moderately elevated RV systolic pressure, moderate pulmonary hypertension and no evidence of pericardial effusion.   Cardiology on board and appreciate help from Dr. Doan.    -Nonrheumatic aortic valve stenosis  Echocardiogram showed moderate AS with peak gradient of 28 mmHg and mean gradient of 18 mmHg.    -CKD stage III  Creatinine is improved and is down to 1.4 today from 1.5 yesterday.  Continue to monitor renal function and avoid nephrotoxic agents.    -Cardiomyopathy, EF of 35% by echo on 7/19.  Continue beta-blocker.  Patient is not on ACE inhibitor, ARB or Entresto due to hyperkalemia and renal failure.    -Hyperkalemia  Potassium level improved and is within normal limits today.  Continue to monitor electrolytes.    -Elevated BNP  BNP remains elevated but is improved since yesterday and is down to 3400 from 5400.  Troponins remain negative.  Echocardiogram shows systolic dysfunction as documented above.    -Acute decompensation of chronic systolic CHF  Patient has diuresed well with Lasix since yesterday.  That pain is improved and is down to 1.4 today.  Continue Lasix  and continue to monitor electrolytes, intake and output and renal function.    Activity: Up with assist  Nutrition: Cardiac diet  Fluids: None  DVT prophylaxis: Anticoagulation with Eliquis  GI prophylaxis: Pepcid      The patient is high risk due to: A. fib with RVR, aortic stenosis, cardiomyopathy, chronic systolic CHF.    I discussed the patients findings and my recommendations with patient and nursing staff.    Abdifatah Hwang MD  11/05/19  10:18 AM

## 2019-11-05 NOTE — PLAN OF CARE
Problem: Patient Care Overview  Goal: Plan of Care Review  Outcome: Ongoing (interventions implemented as appropriate)   11/04/19 2319   Plan of Care Review   Progress improving   Coping/Psychosocial   Plan of Care Reviewed With patient     Goal: Discharge Needs Assessment  Outcome: Ongoing (interventions implemented as appropriate)      Problem: Arrhythmia/Dysrhythmia (Symptomatic) (Adult)  Goal: Signs and Symptoms of Listed Potential Problems Will be Absent, Minimized or Managed (Arrhythmia/Dysrhythmia)  Outcome: Outcome(s) achieved Date Met: 11/04/19      Problem: Fall Risk (Adult)  Goal: Identify Related Risk Factors and Signs and Symptoms  Outcome: Outcome(s) achieved Date Met: 11/04/19    Goal: Absence of Fall  Outcome: Ongoing (interventions implemented as appropriate)      Problem: Skin Injury Risk (Adult)  Goal: Identify Related Risk Factors and Signs and Symptoms  Outcome: Outcome(s) achieved Date Met: 11/04/19    Goal: Skin Health and Integrity  Outcome: Ongoing (interventions implemented as appropriate)

## 2019-11-05 NOTE — PROGRESS NOTES
"  Patient Identification:  Name:  Hugo Colon  Age:  89 y.o.  Sex:  male  :  1930  MRN:  [id number]  Visit Number:  [id number]  Primary care provider:  Deysi Collier APRN    Reason for follow-up:  Persistent A. fib with RVR and moderate aortic stenosis    Subjective     Patient has no new complaints.  No history of chest pain or palpitations.  Telemetry showed A. fib with ventricular rate 100-110 bpm.  BP stable.  No recurrence of bleed from left thigh.      Medication Review:     apixaban 2.5 mg Oral Q12H   furosemide 20 mg Intravenous Daily   metoprolol succinate  mg Oral Q24H   multivitamin with minerals 1 tablet Oral Daily   sodium chloride 10 mL Intravenous Q12H         Vital Sign Min/Max for last 24 hours  Temp  Min: 97.1 °F (36.2 °C)  Max: 98 °F (36.7 °C)   BP  Min: 91/71  Max: 166/66   Pulse  Min: 71  Max: 122   Resp  Min: 18  Max: 22   SpO2  Min: 90 %  Max: 100 %   No Data Recorded   No Data Recorded     Flowsheet Rows      First Filed Value   Admission Height  170.2 cm (67\") Documented at 2019 1213   Admission Weight  70.3 kg (155 lb) Documented at 2019 1213          Objective       Physical Exam:     General Appearance:    Alert, cooperative, in no acute distress   Head:    Normocephalic, without obvious abnormality, atraumatic   Eyes:           Lids and lashes normal, conjunctivae and sclerae normal, no   icterus, no pallor, corneas clear, PERRLA   Ears:    Ears appear intact with no abnormalities noted   Throat:   No oral lesions, no thrush, oral mucosa moist   Neck:   No adenopathy, supple, trachea midline, no thyromegaly, no   carotid bruit, no JVD   Back:     No kyphosis present, no scoliosis present, no skin lesions,      erythema or scars, no tenderness to percussion or                   palpation,   range of motion normal   Lungs:     Clear to auscultation,respirations regular, even and                  unlabored    Heart:   Tachycardia.  Irregular rhythm.  There " was a grade 2 x 6 ejection systolic murmur heard in the base of the precordium with no radiation   Chest Wall:    No abnormalities observed   Abdomen:     Normal bowel sounds, no masses, no organomegaly, soft        non-tender, non-distended, no guarding, no rebound                tenderness   Rectal:     Deferred   Extremities:  1-2+ bilateral pedal edema-pitting.  The bleeding site in the lateral aspect of lower part of left thigh has been dressed.          Telemetry:  A. fib with RVR      Echocardiogram-  Interpretation Summary     · Left ventricular systolic function is moderately decreased.  · Estimated EF appears to be in the range of 36 - 40%.  · Moderateleft ventricular global hypokinesis is noted.  · Normal cardiac chamber dimensions.  · Mild aortic valve regurgitation is present.  · Moderate calcific aortic valve stenosis is present.  · (peak gradient = 28 mmHg and mean gradient = 18 mmHg and there under estimated due to the reduced LVEF.)  · Mild-to-moderate mitral valve regurgitation is present  · Mild tricuspid valve regurgitation is present.  · Estimated right ventricular systolic pressure from tricuspid regurgitation is moderately elevated (45-55 mmHg). Moderate pulmonary hypertension is present  · There is no evidence of pericardial effusion  · Evidence of left sided pleural effusion is present..  · Comparedto the study done 7/16/2019, severity of LV systolic dysfunction and I aortic stenosis is essentially unchanged.         Labs  Results from last 7 days   Lab Units 11/04/19  0218 11/03/19  1229   WBC 10*3/mm3 10.10 10.81*   HEMOGLOBIN g/dL 13.5 13.5   HEMATOCRIT % 43.1 42.1   PLATELETS 10*3/mm3 155 143     Results from last 7 days   Lab Units 11/04/19  0218 11/03/19  1526 11/03/19  1229   SODIUM mmol/L 141  --  141   POTASSIUM mmol/L 4.1 4.4 5.7*   CHLORIDE mmol/L 105  --  108*   CO2 mmol/L 23.9  --  24.7   BUN mg/dL 38*  --  36*   CREATININE mg/dL 1.56*  --  1.45*   CALCIUM mg/dL 9.0  --  8.9    GLUCOSE mg/dL 120*  --  145*     Results from last 7 days   Lab Units 11/04/19  0218 11/03/19  1229   BILIRUBIN mg/dL 0.9 1.0   ALK PHOS U/L 69 68   AST (SGOT) U/L 25 27   ALT (SGPT) U/L 25 27     Results from last 7 days   Lab Units 11/03/19  1229   MAGNESIUM mg/dL 2.2     Results from last 7 days   Lab Units 11/03/19  1229   INR  1.37*         Results from last 7 days   Lab Units 11/04/19  0218 11/03/19  1933 11/03/19  1448   TROPONIN T ng/mL 0.011 <0.010 <0.010           Radiology: Xr Chest 1 View    Result Date: 11/3/2019  Radiographic changes of congestive failure.  This report was finalized on 11/3/2019 1:07 PM by Dr. Keith Loera MD.        Assessment      1.  Persistent A. fib with RVR  2.  Anticoagulated on Eliquis  3.  Moderate calcified aortic stenosis  4.  Cardiomyopathy with EF-35- 40%.  Compensated.  5.  Chronic kidney disease/hyperkalemia  6.  Minor left lower extremity bleed- stopped     Plan     1.  Increase metoprolol succinate XL from 100 to 50 mg p.o. daily for ventricular rate control.  Will discontinue IV Cardizem infusion once ventricular rate <beats per minute  2.  Continue Eliquis 2.5 mg p.o. twice daily  3.  Not a candidate for ACE inhibitor or ARB because of renal failure.  No spironolactone is considered because of history of hyperkalemia.  Will consider Isordil and hydralazine combination if BP stable      Nadja Doan MD [facility]  11/05/19  8:14 AM

## 2019-11-06 NOTE — PROGRESS NOTES
"  Patient Identification:  Name:  Hugo Colon  Age:  89 y.o.  Sex:  male  :  1930  MRN:  [id number]  Visit Number:  [id number]  Primary care provider:  Deysi Collier APRN    Reason for follow-up:  Persistent atrial fibrillation and moderate aortic stenosis    Subjective     Patient denied history of any cardiovascular symptoms.  Telemetry showed A. fib and ventricular rate  bpm.  Hemodynamically stable.  Blood pressure is 140/82 mmHg.      Medication Review:     apixaban 2.5 mg Oral Q12H   furosemide 20 mg Intravenous Daily   [START ON 2019] metoprolol succinate  mg Oral Q24H   multivitamin with minerals 1 tablet Oral Daily   sodium chloride 10 mL Intravenous Q12H         Vital Sign Min/Max for last 24 hours  Temp  Min: 96.2 °F (35.7 °C)  Max: 98.6 °F (37 °C)   BP  Min: 81/66  Max: 142/93   Pulse  Min: 79  Max: 131   Resp  Min: 12  Max: 22   SpO2  Min: 63 %  Max: 100 %   No Data Recorded   No Data Recorded     Flowsheet Rows      First Filed Value   Admission Height  170.2 cm (67\") Documented at 2019 1213   Admission Weight  70.3 kg (155 lb) Documented at 2019 1213          Objective       Physical Exam:     General Appearance:    Alert, cooperative, in no acute distress   Head:    Normocephalic, without obvious abnormality, atraumatic   Eyes:           Lids and lashes normal, conjunctivae and sclerae normal, no   icterus, no pallor, corneas clear, PERRLA   Ears:    Ears appear intact with no abnormalities noted   Throat:   No oral lesions, no thrush, oral mucosa moist   Neck:   No adenopathy, supple, trachea midline, no thyromegaly, no   carotid bruit, no JVD   Back:     No kyphosis present, no scoliosis present, no skin lesions,      erythema or scars, no tenderness to percussion or                   palpation,   range of motion normal   Lungs:     Clear to auscultation,respirations regular, even and                  unlabored    Heart:   Tachycardia.  Irregular " rhythm.  There was a grade 2 x 6 ejection systolic murmur heard in the base of the precordium with no radiation   Chest Wall:    No abnormalities observed   Abdomen:     Normal bowel sounds, no masses, no organomegaly, soft        non-tender, non-distended, no guarding, no rebound                tenderness   Rectal:     Deferred   Extremities:  1+ bilateral pedal edema.          Telemetry:  A. fib.  Ventricular rate  bpm      Labs  Results from last 7 days   Lab Units 11/06/19  0956 11/05/19  1103 11/04/19  0218 11/03/19  1229   WBC 10*3/mm3 12.72* 11.73* 10.10 10.81*   HEMOGLOBIN g/dL 15.0 13.5 13.5 13.5   HEMATOCRIT % 46.6 40.6 43.1 42.1   PLATELETS 10*3/mm3 163 158 155 143     Results from last 7 days   Lab Units 11/05/19  1103 11/04/19  0218 11/03/19  1526 11/03/19  1229   SODIUM mmol/L 140 141  --  141   POTASSIUM mmol/L 3.8 4.1 4.4 5.7*   CHLORIDE mmol/L 103 105  --  108*   CO2 mmol/L 23.6 23.9  --  24.7   BUN mg/dL 35* 38*  --  36*   CREATININE mg/dL 1.48* 1.56*  --  1.45*   CALCIUM mg/dL 9.3 9.0  --  8.9   GLUCOSE mg/dL 91 120*  --  145*     Results from last 7 days   Lab Units 11/05/19  1103 11/04/19  0218 11/03/19  1229   BILIRUBIN mg/dL 1.2 0.9 1.0   ALK PHOS U/L 73 69 68   AST (SGOT) U/L 22 25 27   ALT (SGPT) U/L 22 25 27     Results from last 7 days   Lab Units 11/03/19  1229   MAGNESIUM mg/dL 2.2     Results from last 7 days   Lab Units 11/03/19  1229   INR  1.37*         Results from last 7 days   Lab Units 11/04/19  0218 11/03/19  1933 11/03/19  1448   TROPONIN T ng/mL 0.011 <0.010 <0.010           Radiology: No radiology results for the last day    Assessment      1.  Persistent A. fib with RVR  2.  Anticoagulated on Eliquis  3.  Moderate calcified aortic stenosis  4.  Cardiomyopathy with EF-35- 40%.  Compensated.  5.  Chronic kidney disease/hyperkalemia  6.  Minor left lower extremity bleed- stopped    Plan     1.  Increase Toprol-XL to 200 mg p.o. daily and patient will receive 50 mg x 1  now.  2.  Continue Eliquis 2.5 mg p.o. twice daily  3.  Not a candidate for ACE inhibitor or ARB due to renal failure.  If blood pressure is stable, consider hydralazine and Isordil combination as outpatient.  4.  Follow-up with Dr. Lyons, his primary cardiologist in 2 to 3 weeks.      Nadja Doan MD [facility]  11/06/19  3:31 PM

## 2019-11-06 NOTE — PLAN OF CARE
Problem: Patient Care Overview  Goal: Plan of Care Review  Outcome: Ongoing (interventions implemented as appropriate)   11/06/19 0143   Plan of Care Review   Progress no change   Coping/Psychosocial   Plan of Care Reviewed With patient     Goal: Discharge Needs Assessment  Outcome: Ongoing (interventions implemented as appropriate)   11/03/19 1340 11/03/19 1421 11/04/19 1050   Discharge Needs Assessment   Readmission Within the Last 30 Days --  --  --    Concerns to be Addressed no discharge needs identified;denies needs/concerns at this time --  --    Patient/Family Anticipates Transition to --  --  home;home with help/services   Patient/Family Anticipated Services at Transition --  none --    Transportation Concerns --  --  car, none   Transportation Anticipated --  --  family or friend will provide   Anticipated Changes Related to Illness none --  --    Equipment Needed After Discharge --  --  none   Disability   Equipment Currently Used at Home --  --  cane, straight    11/06/19 0143   Discharge Needs Assessment   Readmission Within the Last 30 Days no previous admission in last 30 days   Concerns to be Addressed --    Patient/Family Anticipates Transition to --    Patient/Family Anticipated Services at Transition --    Transportation Concerns --    Transportation Anticipated --    Anticipated Changes Related to Illness --    Equipment Needed After Discharge --    Disability   Equipment Currently Used at Home --        Problem: Fall Risk (Adult)  Goal: Absence of Fall  Outcome: Ongoing (interventions implemented as appropriate)      Problem: Skin Injury Risk (Adult)  Goal: Skin Health and Integrity  Outcome: Ongoing (interventions implemented as appropriate)   11/06/19 0143   Skin Injury Risk (Adult)   Skin Health and Integrity making progress toward outcome

## 2019-11-06 NOTE — PLAN OF CARE
Problem: Patient Care Overview  Goal: Plan of Care Review  Outcome: Ongoing (interventions implemented as appropriate)   11/06/19 0143   Plan of Care Review   Progress no change   Coping/Psychosocial   Plan of Care Reviewed With patient     Goal: Discharge Needs Assessment  Outcome: Ongoing (interventions implemented as appropriate)   11/03/19 1340 11/03/19 1421 11/04/19 1050   Discharge Needs Assessment   Readmission Within the Last 30 Days --  --  --    Concerns to be Addressed no discharge needs identified;denies needs/concerns at this time --  --    Patient/Family Anticipates Transition to --  --  home;home with help/services   Patient/Family Anticipated Services at Transition --  none --    Transportation Concerns --  --  car, none   Transportation Anticipated --  --  family or friend will provide   Anticipated Changes Related to Illness none --  --    Equipment Needed After Discharge --  --  none   Disability   Equipment Currently Used at Home --  --  cane, straight    11/06/19 0143   Discharge Needs Assessment   Readmission Within the Last 30 Days no previous admission in last 30 days   Concerns to be Addressed --    Patient/Family Anticipates Transition to --    Patient/Family Anticipated Services at Transition --    Transportation Concerns --    Transportation Anticipated --    Anticipated Changes Related to Illness --    Equipment Needed After Discharge --    Disability   Equipment Currently Used at Home --        Problem: Fall Risk (Adult)  Goal: Absence of Fall  Outcome: Ongoing (interventions implemented as appropriate)   11/06/19 0143   Fall Risk (Adult)   Absence of Fall making progress toward outcome       Problem: Skin Injury Risk (Adult)  Goal: Skin Health and Integrity  Outcome: Ongoing (interventions implemented as appropriate)   11/06/19 0143   Skin Injury Risk (Adult)   Skin Health and Integrity making progress toward outcome

## 2019-11-06 NOTE — DISCHARGE PLACEMENT REQUEST
"Isaiah Gillian STACY (89 y.o. Male)     Date of Birth Social Security Number Address Home Phone MRN    02/28/1930  [address] [place] [id number] [phone] [id number]    Scientologist Marital Status          Unknown        Admission Date Admission Type Admitting Provider Attending Provider Department, Room/Bed    11/3/19 Emergency Abdifatah Hwang MD Khan, Hafiz Sarfraz, MD [facility] PROGRESS CARE, P218/1P    Discharge Date Discharge Disposition Discharge Destination         Home or Self Care              Attending Provider:  Abdifatah Hwang MD    Allergies:  Levofloxacin, Penicillins    Isolation:  None   Infection:  None   Code Status:  CPR    Ht:  170.2 cm (67\")   Wt:  68.9 kg (152 lb)    Admission Cmt:  None   Principal Problem:  None                Active Insurance as of 11/3/2019     Primary Coverage     Payor Plan Insurance Group Employer/Plan Group    [facility] MEDICARE REPLACEMENT [facility] [id number]     Payor Plan Address Payor Plan Phone Number Payor Plan Fax Number Effective Dates    [address] [address]   1/1/2016 - None Entered    [facility] [id number]       Subscriber Name Subscriber Birth Date Member ID       GILLIAN HOWELL 2/28/1930 [id number]                 Emergency Contacts      (Rel.) Home Phone Work Phone Mobile Phone    MelodySaleem (Son) [phone] -- --    LAMONT MCDONALD (Relative) [phone] -- [phone]            Emergency Contact Information     Name Relation Home Work Mobile    Saleem Mcdonald Son [phone]      LAMONT MCDONALD Relative [phone]          Insurance Information                [place] [employer] MEDIC[employer] REPLACEMENT/[employer] Phone:     Subscriber: Gillian Howell Subscriber#: [id number]    Group#: [id number] Precert#:              History & Physical      Abdifatah Hwang MD at 11/03/19 1321            [facility]  History & " "Physical          Patient Identification:  Name:  Hugo Colon  Age:  89 y.o.  Sex:  male  :  1930  MRN:  [id number]   Visit Number:  [id number]  Primary Care Physician:  Deysi Collier APRN    I have seen the patient in conjunction with JULI Gustafson and I agree with the following statements:     Subjective     Chief complaint: left leg bleeding, dyspnea     History of presenting illness:    Mr. Colon is a 89 year old male patient who presented to [facility] ED on 11/3/19. He came to the ED because his left leg was bleeding and he couldn't get it to stop. He states his girlfriend tried to get him to go to the doctor about 1-2 weeks ago for worsening dyspnea on exertion but also present on rest. He states he does weigh himself and doesn't believe he has gained any weight. He has had a productive cough but they aren't sure what color. When asked if this is how swollen his legs usually are he states \"I guess\". He denies fevers. No recent hospitalizations. He states he has been taking his medications. He does not wear oxygen at home. He denies any chest pain. Please note Mr. Colon is a poor historian for his past medical history and HPI.     His work up in the ED showed a initial troponin T of <0.010, proBNP 5,350, glucose 145, sodium 141, potassium 5.7, chloride 108, bicarb 24.7, anion gap 8.3, creatinine 1.45, BUN 36, TSH 6.790, free T4 1.23, magnesium 2.2, INR 1.37, WBC 10.81, H&H 13.5 and 42.1, platelet count 143.  Chest x-ray done in the ED per radiology reading shows bibasilar atelectasis cardiomegaly noted, prominent interstitial markings noted small bilateral pleural effusion, radiographic changes of congestive failure.V/S in the ED were Temp 97.6, -129, /83, RR 20. Treatment in the ED included 20mg IV lasix and Cardizem gtt initiated.     His past medical history is significant for atrial fibrillation, hypertension and hyperlipidemia. He quit smoking around 30 years ago. He does " not have any adult children, he does not have any living siblings. He does have a girlfriend at bedside, Phoenix Kruger.       ---------------------------------------------------------------------------------------------------------------------   Review of Systems   Constitutional: Negative for chills, diaphoresis, fatigue and fever.   HENT: Negative for congestion and rhinorrhea.    Eyes: Negative for visual disturbance.   Respiratory: Positive for cough and shortness of breath.    Cardiovascular: Positive for leg swelling. Negative for chest pain.   Gastrointestinal: Negative for abdominal distention, constipation, diarrhea, nausea and vomiting.   Endocrine: Negative for cold intolerance and heat intolerance.   Genitourinary: Negative for difficulty urinating.   Musculoskeletal: Negative for arthralgias.   Skin: Negative for color change and pallor.   Neurological: Negative for dizziness, weakness and light-headedness.   Hematological: Negative for adenopathy.   Psychiatric/Behavioral: Negative for agitation, behavioral problems and confusion.      ---------------------------------------------------------------------------------------------------------------------   Past Medical History:   Diagnosis Date   • Atrial fibrillation (CMS/HCC)    • Hyperlipidemia    • Hypertension      Past Surgical History:   Procedure Laterality Date   • EXTERNAL EAR SURGERY      cancer   • HEMORRHOIDECTOMY     • HERNIA REPAIR       Family History   Problem Relation Age of Onset   • Hypertension Sister    • Hyperlipidemia Sister    • Hyperlipidemia Brother    • Hypertension Brother      Social History     Socioeconomic History   • Marital status:      Spouse name: Not on file   • Number of children: Not on file   • Years of education: Not on file   • Highest education level: Not on file   Tobacco Use   • Smoking status: Former Smoker     Packs/day: 1.00     Years: 17.00     Pack years: 17.00     Types: Cigarettes     Start  date:      Last attempt to quit: 1963     Years since quittin.8   • Smokeless tobacco: Never Used   Substance and Sexual Activity   • Alcohol use: No   • Drug use: No   • Sexual activity: Defer     Comment: derrell has step children     ---------------------------------------------------------------------------------------------------------------------   Allergies:  Levofloxacin and Penicillins  ---------------------------------------------------------------------------------------------------------------------     Medications below are reported home medications pulling from within the system; at this time, these medications have not been reconciled unless otherwise specified and are in the verification process for further verifcation as current home medications.    Prior to Admission Medications     Prescriptions Last Dose Informant Patient Reported? Taking?    apixaban (ELIQUIS) 2.5 MG tablet tablet   No No    Take 1 tablet by mouth Every 12 (Twelve) Hours.    metoprolol tartrate (LOPRESSOR) 50 MG tablet   No No    Take 1 tablet by mouth 2 (Two) Times a Day.    Multiple Vitamins-Minerals (MULTIVITAMIN ADULT PO)   Yes No    Take  by mouth Daily.        Hospital Scheduled Meds:       diltiaZEM 5-15 mg/hr Last Rate: 5 mg/hr (19 1339)     ---------------------------------------------------------------------------------------------------------------------   Objective       Vital Signs:  Temp:  [97.6 °F (36.4 °C)] 97.6 °F (36.4 °C)  Heart Rate:  [107-129] 124  Resp:  [20] 20  BP: (109-123)/(83-98) 115/87      19  1213   Weight: 70.3 kg (155 lb)     Body mass index is 24.28 kg/m².  ---------------------------------------------------------------------------------------------------------------------       Physical Exam    Physical Exam:  Constitutional:  Elderly male in no acute distress  HENT:  Head: Normocephalic and atraumatic.  Mouth:  Moist mucous membranes.    Eyes:  Pupils are equal, round, and  reactive to light.    Neck:  Neck supple.   Cardiovascular:  Normal rate, regular rhythm.  with no murmur.  Pulmonary/Chest:  No respiratory distress, no wheezes, no crackles, with normal breath sounds and good air movement.  Abdominal:  Soft.  Bowel sounds are present.  No distension and no tenderness.   Musculoskeletal:  1+ bilateral lower ext pitting edema, no tenderness, and no deformity.    Neurological:  Alert and oriented to person, place, and time.  No tongue deviation.  No facial droop.  No slurred speech.   Skin:  Skin is warm and dry.  No rash noted.  No pallor. Left leg lateral at knee bleeding varicose vein, now controlled.   Psychiatric:  Normal mood and affect.  Behavior is normal.  Judgment and thought content normal.     ---------------------------------------------------------------------------------------------------------------------  EKG:          ---------------------------------------------------------------------------------------------------------------------   Results from last 7 days   Lab Units 11/03/19  1229   WBC 10*3/mm3 10.81*   HEMOGLOBIN g/dL 13.5   HEMATOCRIT % 42.1   MCV fL 96.1   MCHC g/dL 32.1   PLATELETS 10*3/mm3 143   INR  1.37*         Results from last 7 days   Lab Units 11/03/19  1229   SODIUM mmol/L 141   POTASSIUM mmol/L 5.7*   MAGNESIUM mg/dL 2.2   CHLORIDE mmol/L 108*   CO2 mmol/L 24.7   BUN mg/dL 36*   CREATININE mg/dL 1.45*   EGFR IF NONAFRICN AM mL/min/1.73 46*   CALCIUM mg/dL 8.9   GLUCOSE mg/dL 145*   ALBUMIN g/dL 3.51   BILIRUBIN mg/dL 1.0   ALK PHOS U/L 68   AST (SGOT) U/L 27   ALT (SGPT) U/L 27   Estimated Creatinine Clearance: 34.3 mL/min (A) (by C-G formula based on SCr of 1.45 mg/dL (H)).  No results found for: AMMONIA  Results from last 7 days   Lab Units 11/03/19  1229   TROPONIN T ng/mL <0.010     Results from last 7 days   Lab Units 11/03/19  1229   PROBNP pg/mL 5,350.0*     No results found for: HGBA1C  Lab Results   Component Value Date    TSH 6.790 (H)  11/03/2019    FREET4 1.23 11/03/2019     No results found for: PREGTESTUR, PREGSERUM, HCG, HCGQUANT  Pain Management Panel     There is no flowsheet data to display.                        ---------------------------------------------------------------------------------------------------------------------  Imaging Results (Last 7 Days)     Procedure Component Value Units Date/Time    XR Chest 1 View [[id number]] Collected:  11/03/19 1307     Updated:  11/03/19 1309    Narrative:       Technique: Frontal view the chest.     COMPARISON:  None     INDICATION:     soa      FINDINGS:    Bibasilar atelectasis. Cardiomegaly is noted. Prominent  interstitial markings are noted. There are small bilateral pleural  effusions.        Impression:       Radiographic changes of congestive failure.     This report was finalized on 11/3/2019 1:07 PM by Dr. Keith Loera MD.             Cultures:none     ---------------------------------------------------------------------------------------------------------------------  Assessment / Plan       Assessment and Plan:      SIRS without significant source of infection: (leukocytosis (10.81), tachycardia (-129), LEVAR): chest xray does not appreciate any concern for infection, no fevers. Above Likely from a-fib with rvr. UA ordered, monitor off antibiotics for now, repeat labs in the am.     Persistent A-Fib with RVR, on chronic oral anticoagulation: continue eliquis, continue Cardizem per protocol. Rate is better controlled on my exam at .     Acute on Chronic Systolic HF, EF 35%:  Lasix given in the ED, monitor BP and renal tolerance. Daily weights, trend troponin. Echo ordered. Repeat BNP ordered for the am.     Severe aortic Valve stenosis: monitor fluid status closey.     LEVAR on CKD stage III with mild hyperkalemia: creatinine is 1.45, potassium is 5.7, lasix was given in the ED, repeat potassium ordered for 1600 ordered.     Small bilateral pleural effusions: diuresis as  noted above, monitor.    Bleeding lateral (left) varicose vein: bleeding now controlled, patient denies any injury or scratching it, monitor closely.      Hypothyroidism: TSH is 6.790, Free T4 is normal at 1.23, likely euthyroid sick syndrome, would recommended repeat TSH in 3-4 weeks outpatient.     Activity: bedrest, turn q2hr   Diet: Cardiac   Precautions: falls   DVT prophylaxis: continue PO eliquis     Code status: Full     Patient is high risk for the following reasons: Persistent A-Fib with RVR, Acute on chronic systolic HF, EF 35%    Karina Mortensen, APRN  11/03/19  1:49 PM  ---------------------------------------------------------------------------------------------------------------------   I have seen and examined the patient. I agree with the assessment and plan of Karina Mortensen, APRN.    89-year-old male with past medical history significant for persistent atrial fibrillation, chronic kidney disease and chronic systolic CHF presented to the emergency department today with bleeding from his left leg.  Patient also admits that he has been having shortness of breath for last 2 weeks.  Patient states that since last 2 weeks he is unable to walk around the house due to exertional dyspnea which is new.  Patient denies any fevers or chills.  He denies any cough, sputum production.  He denies any chest pain or palpitations.  Patient denies any dizziness or lightheadedness.  He woke up this morning and was bleeding from his left leg and therefore presented to the emergency department.    Constitutional: Elderly male, lying in bed, no acute distress.  HEENT: Pupils are equal and reactive to light.  Fundi not seen.   Neck: Supple.  No JVD  Lungs: Clear to auscultation bilaterally.  CVS: Irregularly irregular rhythm, tachycardic S1 + S2, no murmurs, rubs or gallops.  Abdomen: Soft, nontender, no viscera was palpable, bowel sounds audible.  Peripheral vascular: Dorsalis pedis palpable and no edema.  It has  varicose veins and has bandage in his left leg from previous punctate bleeding.  Neurologic: Awake, alert and oriented to place, person and time.  Cranial nerves grossly intact.  Strength bilaterally symmetrical in upper and lower extremities.      A/P:  -Persistent A. fib  -Nonrheumatic aortic valve stenosis  -CKD stage III  -Cardiomyopathy, EF of 35% by echo on 7/19.  -Hyperkalemia  -Elevated BNP  -Acute decompensation of chronic systolic CHF  -Hypertension    Patient has been started on Cardizem drip for rate control and will continue.  Patient denies missing any doses.  Continue metoprolol.  Continue anticoagulation with Eliquis for stroke prophylaxis.  Patient had an echocardiogram done in July that showed EF of 35%.  I will repeat an echocardiogram to evaluate LV SF.  Will consult cardiology for expert opinion and management.  Continue patient beta-blocker and start gentle diuresis.  Monitor intake and output and daily weight.  Will hold ACE inhibitor due to chronic kidney disease.  Monitor renal function and avoid nephrotoxic agents.  TSH is elevated but free T4 is within normal limits.     Electronically signed by Abdifatah Hwang MD at 11/03/19 [address] [place] [id number] Medications (active)       Dose Frequency Start End    apixaban (ELIQUIS) tablet 2.5 mg 2.5 mg Every 12 Hours Scheduled 11/3/2019     Sig - Route: Take 1 tablet by mouth Every 12 (Twelve) Hours. - Oral    diltiaZEM (CARDIZEM) 125mg/125 mL (1 mg/mL) infusion 5-15 mg/hr Titrated 11/3/2019     Sig - Route: Infuse 5-15 mg/hr into a venous catheter Dose Adjusted By Provider As Needed. - Intravenous    furosemide (LASIX) injection 20 mg 20 mg Daily 11/5/2019     Sig - Route: Infuse 2 mL into a venous catheter Daily. - Intravenous    metoprolol succinate XL (TOPROL-XL) 24 hr tablet 150 mg 150 mg Every 24 Hours Scheduled 11/5/2019     Sig - Route: Take 3 tablets by mouth Daily. - Oral    multivitamin with minerals (MULTILEX-T&M) 1 tablet 1 tablet  "Daily 11/4/2019     Sig - Route: Take 1 tablet by mouth Daily. - Oral    sodium chloride 0.9 % flush 10 mL 10 mL As Needed 11/3/2019     Sig - Route: Infuse 10 mL into a venous catheter As Needed for Line Care. - Intravenous    Linked Group 1:  \"And\" Linked Group Details        sodium chloride 0.9 % flush 10 mL 10 mL Every 12 Hours Scheduled 11/3/2019     Sig - Route: Infuse 10 mL into a venous catheter Every 12 (Twelve) Hours. - Intravenous    sodium chloride 0.9 % flush 10 mL 10 mL As Needed 11/3/2019     Sig - Route: Infuse 10 mL into a venous catheter As Needed for Line Care. - Intravenous            Lab Results (last 24 hours)     Procedure Component Value Units Date/Time    CBC & Differential [[id number]] Collected:  11/06/19 0956    Specimen:  Blood Updated:  11/06/19 1049    Narrative:       The following orders were created for panel order CBC & Differential.  Procedure                               Abnormality         Status                     ---------                               -----------         ------                     CBC Auto Differential[[id number]]        Abnormal            Final result                 Please view results for these tests on the individual orders.    CBC Auto Differential [[id number]]  (Abnormal) Collected:  11/06/19 0956    Specimen:  Blood Updated:  11/06/19 1049     WBC 12.72 10*3/mm3      RBC 4.92 10*6/mm3      Hemoglobin 15.0 g/dL      Hematocrit 46.6 %      MCV 94.7 fL      MCH 30.5 pg      MCHC 32.2 g/dL      RDW 14.1 %      RDW-SD 48.6 fl      MPV 12.4 fL      Platelets 163 10*3/mm3      Neutrophil % 73.5 %      Lymphocyte % 11.6 %      Monocyte % 11.6 %      Eosinophil % 2.0 %      Basophil % 0.9 %      Immature Grans % 0.4 %      Neutrophils, Absolute 9.36 10*3/mm3      Lymphocytes, Absolute 1.47 10*3/mm3      Monocytes, Absolute 1.47 10*3/mm3      Eosinophils, Absolute 0.25 10*3/mm3      Basophils, Absolute 0.12 10*3/mm3      Immature Grans, Absolute 0.05 10*3/mm3  "     nRBC 0.0 /100 WBC     BNP [[id number]]  (Abnormal) Collected:  11/05/19 1103    Specimen:  Blood Updated:  11/05/19 1202     proBNP 3,438.0 pg/mL     Narrative:       Among patients with dyspnea, NT-proBNP is highly sensitive for the detection of acute congestive heart failure. In addition NT-proBNP of <300 pg/ml effectively rules out acute congestive heart failure with 99% negative predictive value.    Comprehensive Metabolic Panel [[id number]]  (Abnormal) Collected:  11/05/19 1103    Specimen:  Blood Updated:  11/05/19 1158     Glucose 91 mg/dL      BUN 35 mg/dL      Creatinine 1.48 mg/dL      Sodium 140 mmol/L      Potassium 3.8 mmol/L      Chloride 103 mmol/L      CO2 23.6 mmol/L      Calcium 9.3 mg/dL      Total Protein 6.3 g/dL      Albumin 3.74 g/dL      ALT (SGPT) 22 U/L      AST (SGOT) 22 U/L      Alkaline Phosphatase 73 U/L      Total Bilirubin 1.2 mg/dL      eGFR Non African Amer 45 mL/min/1.73      Globulin 2.6 gm/dL      A/G Ratio 1.5 g/dL      BUN/Creatinine Ratio 23.6     Anion Gap 13.4 mmol/L     Narrative:       GFR Normal >60  Chronic Kidney Disease <60  Kidney Failure <15    CBC & Differential [[id number]] Collected:  11/05/19 1103    Specimen:  Blood Updated:  11/05/19 1157    Narrative:       The following orders were created for panel order CBC & Differential.  Procedure                               Abnormality         Status                     ---------                               -----------         ------                     CBC Auto Differential[[id number]]        Abnormal            Final result                 Please view results for these tests on the individual orders.    CBC Auto Differential [[id number]]  (Abnormal) Collected:  11/05/19 1103    Specimen:  Blood Updated:  11/05/19 1157     WBC 11.73 10*3/mm3      RBC 4.39 10*6/mm3      Hemoglobin 13.5 g/dL      Hematocrit 40.6 %      MCV 92.5 fL      MCH 30.8 pg      MCHC 33.3 g/dL      RDW 14.1 %      RDW-SD 47.8 fl      MPV 12.2  fL      Platelets 158 10*3/mm3      Neutrophil % 75.3 %      Lymphocyte % 10.1 %      Monocyte % 12.0 %      Eosinophil % 1.2 %      Basophil % 0.6 %      Immature Grans % 0.8 %      Neutrophils, Absolute 8.83 10*3/mm3      Lymphocytes, Absolute 1.19 10*3/mm3      Monocytes, Absolute 1.41 10*3/mm3      Eosinophils, Absolute 0.14 10*3/mm3      Basophils, Absolute 0.07 10*3/mm3      Immature Grans, Absolute 0.09 10*3/mm3      nRBC 0.0 /100 WBC         Imaging Results (Last 24 Hours)     ** No results found for the last 24 hours. **           Physician Progress Notes (most recent note)      Abdifatah Hwang MD at 19 1017              [facility]     Patient Identification:  Name:  Hugo Colon  Age:  89 y.o.  Sex:  male  :  1930  MRN:  [id number]  Visit Number:  [id number]  Primary Care Provider:  Deysi Collier APRN    Length of stay:  2    Chief complaint: Follow-up for A. fib with RVR        Subjective:    Patient seen with nursing staff.  No family was present at bedside.  Patient states that he is doing better.  He denies any new complaints.  Patient states that his breathing is improved since yesterday.  Patient has good urine output.  Denies any complaints. Patient denies any chest pain or palpitations.  He denies any dizziness or lightheadedness.         Current Hospital Meds:    apixaban 2.5 mg Oral Q12H   furosemide 20 mg Intravenous Daily   metoprolol succinate  mg Oral Q24H   multivitamin with minerals 1 tablet Oral Daily   sodium chloride 10 mL Intravenous Q12H       diltiaZEM 5-15 mg/hr Last Rate: 5 mg/hr (19 1000)     ----------------------------------------------------------------------------------------------------------------------  Vital Signs:  Temp:  [97.1 °F (36.2 °C)-98 °F (36.7 °C)] 97.7 °F (36.5 °C)  Heart Rate:  [] 107  Resp:  [18-22] 18  BP: ()/() 122/93      19  1213 19  1418 19  0400    Weight: 70.3 kg (155 lb) 69.4 kg (153 lb 1.6 oz) 68.9 kg (152 lb)     Body mass index is 23.81 kg/m².    Intake/Output Summary (Last 24 hours) at 11/5/2019 1018  Last data filed at 11/5/2019 0800  Gross per 24 hour   Intake 2968 ml   Output 1250 ml   Net 1718 ml     Diet Regular; Cardiac  ----------------------------------------------------------------------------------------------------------------------  Physical exam:  Constitutional: Elderly male, lying in bed, no acute distress.  HENT:  Head:  Normocephalic and atraumatic.  Mouth:  Moist mucous membranes.    Pupils are equal, round, and reactive to light.   Neck:  Neck supple.  No JVD present.    Cardiovascular: Irregularly irregular rhythm.  S1+S2. No murmur, rubs or gallops.   Pulmonary/Chest: Clear to auscultation bilaterally.   Abdominal:  Soft. Non-tender. No viscera palpable.  Bowel sounds audible.   Musculoskeletal: No deformity or joint swelling.   Peripheral vascular: Bilateral dorsalis pedis palpable.  Minimal pitting edema bilaterally on ankles.  Neurological:  Alert and oriented to person, place, and time.  Cranial nerves grossly intact. Strength bilaterally symmetrical in upper and lower extremities.   Skin:  Skin is warm and dry. No rash noted. No pallor.   ----------------------------------------------------------------------------------------------------------------------  Tele: SAMAN lea with heart rate fluctuating from .  ----------------------------------------------------------------------------------------------------------------------  Results from last 7 days   Lab Units 11/04/19 0218 11/03/19  1933 11/03/19  1448   TROPONIN T ng/mL 0.011 <0.010 <0.010     Results from last 7 days   Lab Units 11/04/19 0218 11/03/19  1229   WBC 10*3/mm3 10.10 10.81*   HEMOGLOBIN g/dL 13.5 13.5   HEMATOCRIT % 43.1 42.1   MCV fL 97.1* 96.1   MCHC g/dL 31.3* 32.1   PLATELETS 10*3/mm3 155 143   INR   --  1.37*         Results from last 7 days   Lab  Units 11/04/19  0218 11/03/19  1526 11/03/19  1229   SODIUM mmol/L 141  --  141   POTASSIUM mmol/L 4.1 4.4 5.7*   MAGNESIUM mg/dL  --   --  2.2   CHLORIDE mmol/L 105  --  108*   CO2 mmol/L 23.9  --  24.7   BUN mg/dL 38*  --  36*   CREATININE mg/dL 1.56*  --  1.45*   EGFR IF NONAFRICN AM mL/min/1.73 42*  --  46*   CALCIUM mg/dL 9.0  --  8.9   GLUCOSE mg/dL 120*  --  145*   ALBUMIN g/dL 3.45*  --  3.51   BILIRUBIN mg/dL 0.9  --  1.0   ALK PHOS U/L 69  --  68   AST (SGOT) U/L 25  --  27   ALT (SGPT) U/L 25  --  27   Estimated Creatinine Clearance: 31.3 mL/min (A) (by C-G formula based on SCr of 1.56 mg/dL (H)).    No results found for: AMMONIA                    I have personally looked at the labs and they are summarized above.  ----------------------------------------------------------------------------------------------------------------------  Imaging Results (Last 24 Hours)     ** No results found for the last 24 hours. **        ----------------------------------------------------------------------------------------------------------------------  Assessment and Plan:    -Persistent A. Fib with RVR  Patient continues to remain on Cardizem drip.  Patient still continues to have tachycardia and metoprolol has been increased 150 mg daily. Continue anticoagulation with Eliquis.    Echocardiogram showed moderately decreased LV SF, EF of 36 to 40%, moderate LV global hypokinesis, normal cardiac chambers, mild AR, moderate AS (peak gradient 28 mmHg), moderate MR, mild TR, moderately elevated RV systolic pressure, moderate pulmonary hypertension and no evidence of pericardial effusion.   Cardiology on board and appreciate help from Dr. Doan.    -Nonrheumatic aortic valve stenosis  Echocardiogram showed moderate AS with peak gradient of 28 mmHg and mean gradient of 18 mmHg.    -CKD stage III  Creatinine is improved and is down to 1.4 today from 1.5 yesterday.  Continue to monitor renal function and avoid nephrotoxic  agents.    -Cardiomyopathy, EF of 35% by echo on .  Continue beta-blocker.  Patient is not on ACE inhibitor, ARB or Entresto due to hyperkalemia and renal failure.    -Hyperkalemia  Potassium level improved and is within normal limits today.  Continue to monitor electrolytes.    -Elevated BNP  BNP remains elevated but is improved since yesterday and is down to 3400 from 5400.  Troponins remain negative.  Echocardiogram shows systolic dysfunction as documented above.    -Acute decompensation of chronic systolic CHF  Patient has diuresed well with Lasix since yesterday.  That pain is improved and is down to 1.4 today.  Continue Lasix and continue to monitor electrolytes, intake and output and renal function.    Activity: Up with assist  Nutrition: Cardiac diet  Fluids: None  DVT prophylaxis: Anticoagulation with Eliquis  GI prophylaxis: Pepcid      The patient is high risk due to: A. fib with RVR, aortic stenosis, cardiomyopathy, chronic systolic CHF.    I discussed the patients findings and my recommendations with patient and nursing staff.    Abdifatah Hwang MD  19  10:18 AM    Electronically signed by Abdifatah Hwang MD at 19 1653       Medical Student Notes (most recent note)     No notes of this type exist for this encounter.           Consult Notes (most recent note)      Nadja Doan MD at 19 1603              Cardiology  CONSULT NOTE    Consults    Patient Identification:  Name:  Hugo Colon  Age:  89 y.o.  Sex:  male  :  1930  MRN:  [id number]  Visit Number:  [id number]  Primary care provider:  Deysi Collier APRN    Subjective     Referring provider- JULI Clemons    Reason for the consult:  A. fib with RVR    Chief complaints:  Bleeding in the left leg      History of presenting illness:    89-year-old male was brought to ED in an ambulance because of history of bleed from a small wound in the lateral aspect of lower part of left eye which has been  stopped now.    He has history of dyspnea on exertion-functional class I-II.  He reported that he could walk 1 mile at a slow pace without any difficulty.  He has history of intermittent leg edema.  proBNP level is elevated but chest x-ray showed no evidence of CHF.  Small bilateral pleural effusion was present and patient received Lasix x1 IV in ER.  Patient has history of CKD.    He has history of persistent A. fib diagnosed 5/2019.  Patient was seen by a physician assistant belonged to a EP specialist, Dr. Antony.  Recommended Lopressor 100 mg p.o. twice daily.  Toprol-XL 50 mg was stopped.  Patient has been anticoagulated on Eliquis 2.5 mg p.o. twice daily.    Patient has history of moderate to severe calcific left ear.  LVEF was 35%.  Echocardiogram done 5/2019.  No history of known CAD.    He denied history of hypertension, DM, lipid disorder or smoking.      --------------------------------------------------------------------------------------------------------------------  Review of Systems:    Constitutional- fatigue   ENT-none  Cardiovascular-as above  Respiratory-none  Integumentary-bleeding from left thigh-stopped  Other organ system involvement-negative    ---------------------------------------------------------------------------------------------------------------------   Past History:  Family History   Problem Relation Age of Onset   • Hypertension Sister    • Hyperlipidemia Sister    • Hyperlipidemia Brother    • Hypertension Brother      Past Medical History:   Diagnosis Date   • Atrial fibrillation (CMS/HCC)    • Hyperlipidemia    • Hypertension      Past Surgical History:   Procedure Laterality Date   • EXTERNAL EAR SURGERY      cancer   • HEMORRHOIDECTOMY     • HERNIA REPAIR       Social History     Socioeconomic History   • Marital status:      Spouse name: Not on file   • Number of children: Not on file   • Years of education: Not on file   • Highest education level: Not on file    Tobacco Use   • Smoking status: Former Smoker     Packs/day: 1.00     Years: 17.00     Pack years: 17.00     Types: Cigarettes     Start date:      Last attempt to quit: 1963     Years since quittin.8   • Smokeless tobacco: Never Used   Substance and Sexual Activity   • Alcohol use: No   • Drug use: No   • Sexual activity: Defer     Comment: , has step children     ---------------------------------------------------------------------------------------------------------------------   Allergies:  Levofloxacin and Penicillins  ---------------------------------------------------------------------------------------------------------------------   Prior to Admission Medications     Prescriptions Last Dose Informant Patient Reported? Taking?    apixaban (ELIQUIS) 2.5 MG tablet tablet Past Week Self No Yes    Take 1 tablet by mouth Every 12 (Twelve) Hours.    metoprolol tartrate (LOPRESSOR) 50 MG tablet Past Week Self No Yes    Take 1 tablet by mouth 2 (Two) Times a Day.    Multiple Vitamins-Minerals (MULTIVITAMIN ADULT PO) Past Week Self Yes Yes    Take 1 tablet by mouth Daily.        Hospital Meds:    apixaban 2.5 mg Oral Q12H   influenza vaccine 0.5 mL Intramuscular Once   metoprolol succinate  mg Oral Q24H   sodium chloride 10 mL Intravenous Q12H       diltiaZEM 5-15 mg/hr Last Rate: 5 mg/hr (19 2489)     ---------------------------------------------------------------------------------------------------------------------     Objective     Vital Signs:  Temp:  [97.6 °F (36.4 °C)] 97.6 °F (36.4 °C)  Heart Rate:  [107-129] 123  Resp:  [15-20] 16  BP: (109-126)/(83-98) 116/96      19  1213 19  1418   Weight: 70.3 kg (155 lb) 69.4 kg (153 lb 1.6 oz)     Body mass index is 23.98 kg/m².  ---------------------------------------------------------------------------------------------------------------------   Physical exam:  General Appearance:    Alert, cooperative, in no acute distress    Head:    Normocephalic, without obvious abnormality, atraumatic   Eyes:            Lids and lashes normal, conjunctivae and sclerae normal, no   icterus, no pallor, corneas clear, PERRLA   Ears:    Ears appear intact with no abnormalities noted   Throat:   No oral lesions, no thrush, oral mucosa moist   Neck:   No adenopathy, supple, trachea midline, no thyromegaly, no   carotid bruit, no JVD   Back:     No kyphosis present, no scoliosis present, no skin lesions,      erythema or scars, no tenderness to percussion or                   palpation,   range of motion normal   Lungs:     Clear to auscultation,respirations regular, even and                  unlabored    Heart:   Tachycardia.  Irregular rhythm.  There was a grade 2 x 6 ejection systolic murmur heard in the base of the precordium with no radiation   Chest Wall:    No abnormalities observed   Abdomen:     Normal bowel sounds, no masses, no organomegaly, soft        non-tender, non-distended, no guarding, no rebound                tenderness   Rectal:     Deferred   Extremities:  1-2+ bilateral pedal edema-pitting.  The bleeding site in the lateral aspect of lower part of left thigh has been dressed.   Pulses:   Pulses palpable and equal bilaterally   Skin:   No bleeding, bruising or rash       Neurologic:   Cranial nerves 2 - 12 grossly intact, sensation intact, DTR       present and equal bilaterally         Telemetry:  SAMAN lea.  Regular rate-100s    ---------------------------------------------------------------------------------------------------------------------   EKG:   SAMAN lea with RVR    Echocardiogram 5/2019-    Interpretation Summary     · Normal left ventricular cavity size and wall thickness noted. There is left ventricular global hypokinesis  · Left ventricular systolic function is moderately decreased. Estimated EF appears to be around 35%.  · There is moderate calcification of the aortic valve, Mild aortic valve regurgitation is present.  Severe aortic valve stenosis is noted.Ao max PG 25.2 mmHg, Ao mean PG 14.4 mmHg, JOSE(I,D) 1.14 cm^2  · Mild-to-moderate mitral valve regurgitation is present  · Mild tricuspid valve regurgitation is present. Mild pulmonary hypertension is present,(35-45 mmHg).  · Mild pulmonic valve regurgitation is present.  · There is no evidence of pericardial effusion.          I   ---------------------------------------------------------------------------------------------------------------------   Results from last 7 days   Lab Units 11/03/19  1229   WBC 10*3/mm3 10.81*   HEMOGLOBIN g/dL 13.5   HEMATOCRIT % 42.1   MCV fL 96.1   MCHC g/dL 32.1   PLATELETS 10*3/mm3 143   INR  1.37*         Results from last 7 days   Lab Units 11/03/19  1526 11/03/19  1229   SODIUM mmol/L  --  141   POTASSIUM mmol/L 4.4 5.7*   MAGNESIUM mg/dL  --  2.2   CHLORIDE mmol/L  --  108*   CO2 mmol/L  --  24.7   BUN mg/dL  --  36*   CREATININE mg/dL  --  1.45*   EGFR IF NONAFRICN AM mL/min/1.73  --  46*   CALCIUM mg/dL  --  8.9   GLUCOSE mg/dL  --  145*   ALBUMIN g/dL  --  3.51   BILIRUBIN mg/dL  --  1.0   ALK PHOS U/L  --  68   AST (SGOT) U/L  --  27   ALT (SGPT) U/L  --  27   Estimated Creatinine Clearance: 33.9 mL/min (A) (by C-G formula based on SCr of 1.45 mg/dL (H)).  No results found for: AMMONIA  Results from last 7 days   Lab Units 11/03/19  1448 11/03/19  1229   TROPONIN T ng/mL <0.010 <0.010     Results from last 7 days   Lab Units 11/03/19  1229   PROBNP pg/mL 5,350.0*     No results found for: HGBA1C  Lab Results   Component Value Date    TSH 6.790 (H) 11/03/2019    FREET4 1.23 11/03/2019     No results found for: PREGTESTUR, PREGSERUM, HCG, HCGQUANT  Pain Management Panel     There is no flowsheet data to display.                        ---------------------------------------------------------------------------------------------------------------------   Imaging Results (Last 7 Days)     Procedure Component Value Units Date/Time    XR Chest  1 View [[id number]] Collected:  11/03/19 1307     Updated:  11/03/19 1309    Narrative:       Technique: Frontal view the chest.     COMPARISON:  None     INDICATION:     soa      FINDINGS:    Bibasilar atelectasis. Cardiomegaly is noted. Prominent  interstitial markings are noted. There are small bilateral pleural  effusions.        Impression:       Radiographic changes of congestive failure.     This report was finalized on 11/3/2019 1:07 PM by Dr. Keith Loera MD.             I have personally reviewed the radiology images and read the final radiology report.        Assessment      1.  Persistent A. fib with RVR  2.  Anticoagulated on Eliquis  3.  Moderate calcified aortic stenosis  4.  Cardiomyopathy with EF-35% compensated   5.  Chronic kidney disease/hyperkalemia  6.  Minor left lower extremity bleed- stopped    Recommendations     1.  Metoprolol tartrate has been changed to metoprolol succinate  mg p.o. daily because of reduced EF for the control of ventricular rate.  Meanwhile, will continue IV Cardizem infusion for now.  2.  For reduced EF, continue metoprolol succinate.  Not a candidate for ACE inhibitor or Entresto and spironolactone because of chronic renal failure and hyperkalemia  3.  As CHF is clinically and radiologically compensated, no need of diuretics.  4.  Echocardiogram has been ordered to reassess LVEF and severity of aortic stenosis  5.  I discussed my recommendations with the patient.    Thank you for the opportunity to participate in the care of your patient. Please do not hesitate to call with any questions or concerns.     Nadja Doan MD, [facility]  11/03/19  4:03 PM     Electronically signed by Nadja Doan MD at 11/03/19 6086

## 2019-11-06 NOTE — PROGRESS NOTES
Discharge Planning Assessment  LISA Mederos     Patient Name: Hugo Colon  MRN: [id number]  Today's Date: 11/6/2019    Admit Date: 11/3/2019      Discharge Plan     Row Name 11/06/19 1233       Plan    Final Discharge Disposition Code     Final Note  Pt is being discharged home on this date. Pt utilizes [facility]. SS contacted  on this date to inform of discharge. SS faxed pt's information. Nursing to call report. No further needs at this time.     SS recevied call from [facility], stating they do not have pt active. SS reviewed the chart. Pt does not have home health services at this time. No order for home health. No further needs at this time.         Home Medical Care - Selection Complete      Service Provider Request Status Selected Services Address Phone Number Fax Number    [facility] [address] [place] [id number] [phone]      Therapy      No service coordination in this encounter.      Community Resources      No service coordination in this encounter.        Expected Discharge Date and Time     Expected Discharge Date Expected Discharge Time    Nov 6, 2019               LIEN JorgeW

## 2019-11-06 NOTE — DISCHARGE SUMMARY
[facility] HOSPITALIST MEDICINE DISCHARGE SUMMARY    Patient Identification:  Name:  uHgo Colon  Age:  89 y.o.  Sex:  male  :  1930  MRN:  [id number]  Visit Number:  [id number]    Date of Admission: 11/3/2019  Date of Discharge:  2019     PCP: Deysi Collier APRN    DISCHARGE DIAGNOSIS  · A. fib with RVR, improved  · Nonrheumatic aortic valve stenosis  · Kidney stage III, stable  · Cardiomyopathy, EF of 36 to 40% on echocardiogram  · Hyperkalemia, improved  · Elevated BNP, improved  · Acute decompensation of chronic systolic CHF, improved      CONSULTS   Cardiology      PROCEDURES PERFORMED      REASON FOR ADMISSION  Patient is a 89 y.o. male presented to [facility] complaining of dyspnea and left leg bleeding.  Please see the admitting history and physical for further details.       HOSPITAL COURSE   Hugo Colon 89 y.o. male with PMH significant for CKD, chronic systolic CHF and persistent A. fib presented to the emergency department complaints of shortness of breath and was found to have A. fib with RVR.  Patient was admitted to progressive care unit for further evaluation and management.    Patient was started on Cardizem drip due to rapid ventricular response.  He was continued on his home dose of metoprolol and it was increased to control his heart rate.  Patient had improvement in his heart rate and was taken off of Cardizem.  Her metoprolol was increased to 200 mg daily.  He also has history of nonrheumatic aortic valve stenosis and echocardiogram was done that showed moderately decreased LV SF, EF of 36 to 40%, moderate LV global hypokinesis, normal cardiac chambers, mild AR, moderate AS (peak gradient 28 mmHg), moderate MR, mild TR, moderately elevated RV systolic pressure, moderate pulmonary hypertension and no evidence of pericardial effusion.  Patient has history of chronic kidney disease and his creatinine remained stable during the course of  hospitalization.  Patient also had elevated BNP and evidence of CHF decompensation.  He was continued on Lasix and diuresed well.  Net fluid balance was -1600 mL.  Patient had hyperkalemia on admission which improved.  He was continued on beta-blocker and not started on ACE inhibitor, ARB or Entresto due to hyperkalemia and renal failure.  Patient was feeling much better, his breathing had improved.  Patient had a small punctate lesion on his leg from his varicose veins where he was bleeding from on presentation but bleeding stopped in the emergency department and patient did not have any further bleeding.  He is felt to have achieved maximum benefit of hospitalization and is being discharged home in stable condition.      Vitals:    11/06/19 1524   BP: 142/93   Pulse: 115   Resp:    Temp:    SpO2:        PHYSICIAL EXAMINATION:  Constitutional: Elderly male, lying in bed, no acute distress.  HENT:  Head:  Normocephalic and atraumatic.  Mouth:  Moist mucous membranes.    Pupils are equal, round, and reactive to light.   Neck:  Neck supple.  No JVD present.    Cardiovascular: Irregularly irregular rhythm.  S1+S2. No murmur, rubs or gallops.   Pulmonary/Chest: Clear to auscultation bilaterally.   Abdominal:  Soft. Non-tender. No viscera palpable.  Bowel sounds audible.   Musculoskeletal: No deformity or joint swelling.   Peripheral vascular: Bilateral dorsalis pedis palpable.  Minimal pitting edema bilaterally on ankles.  Neurological:  Alert and oriented to person, place, and time.  Cranial nerves grossly intact. Strength bilaterally symmetrical in upper and lower extremities.   Skin:  Skin is warm and dry. No rash noted. No pallor.     DISCHARGE DISPOSITION   Home        DISCHARGE MEDICATIONS:     Discharge Medications      New Medications      Instructions Start Date   atorvastatin 10 MG tablet  Commonly known as:  LIPITOR   10 mg, Oral, Daily      furosemide 20 MG tablet  Commonly known as:  LASIX   20 mg, Oral, 2  Times Daily      metoprolol succinate  MG 24 hr tablet  Commonly known as:  TOPROL-XL   200 mg, Oral, Every 24 Hours Scheduled   Start Date:  11/7/2019        Continue These Medications      Instructions Start Date   apixaban 2.5 MG tablet tablet  Commonly known as:  ELIQUIS   2.5 mg, Oral, Every 12 Hours Scheduled      MULTIVITAMIN ADULT PO   1 tablet, Oral, Daily         Stop These Medications    metoprolol tartrate 50 MG tablet  Commonly known as:  LOPRESSOR            Diet Instructions     Diet: Regular, Cardiac      Discharge Diet:   Regular  Cardiac           Your Scheduled Appointments    Nov 13, 2019  9:15 AM EST  Hospital Follow Up with JULI Haddad  [facility] FAMILY MEDICINE (--) [address] [facility] [address] [phone]   Dec 20, 2019  9:15 AM EST  Follow Up with Samantha Iniguez MD  [facility] CARDIOLOGY (--) [address] [place] [address] [phone]   Arrive 15 minutes prior to appointment.   Fredrick 10, 2020 11:00 AM EST  Follow Up with Brandt Arias MD  [facility] CARDIOLOGY (--) [address]  [facility] [address] [phone]   Arrive 15 minutes prior to appointment.        Activity Instructions     Activity as Tolerated          Additional Instructions for the Follow-ups that You Need to Schedule     Discharge Follow-up with PCP   As directed       Currently Documented PCP:    Deysi Collier APRN    PCP Phone Number:    [phone]     Follow Up Details:  1 week         Discharge Follow-up with Specified Provider: cardiology; 1 Week   As directed      To:  cardiology    Follow Up:  1 Week            Contact information for follow-up providers     Deysi Collier APRN .    Specialty:  Family Medicine  Why:  1 week  Contact information:  [address] [facility] [address] [phone]                   Contact information for after-discharge  care     Home Medical Care     [facility] .    Service:  Home Health Services  Contact information:  [address] [facility] [address] [phone]                             TEST  RESULTS PENDING AT DISCHARGE       Abdifatah Hwang MD  11/06/19  3:39 PM    Please note that this discharge summary required more than 30 minutes to complete.    Please send a copy of this dictation to the following providers:    Deysi Collier APRN

## 2019-11-07 NOTE — OUTREACH NOTE
Prep Survey      Responses   Facility patient discharged from?  [facility]   Is patient eligible?  Yes   Discharge diagnosis  A. fib with RVR, improved   Does the patient have one of the following disease processes/diagnoses(primary or secondary)?  CHF   Does the patient have Home health ordered?  Yes   [facility]     Medication alerts for this patient  Lasix    Prep survey completed?  Yes          Yumiko Damico RN

## 2019-11-07 NOTE — OUTREACH NOTE
"[facility] call completed. Please see flow sheet for additional details.  Pt states he's doing \"fine\". Denies chest pain, palps, tachycardia, SOA, cough, N/V, edema, F/C/S, D/C. No further LLE bleeding. Confirms taking new RXs as per AVS. He says he thinks HH called him, but he hasn't gotten back to them.  He was advised to return their call for initial evaluation & he verb understanding. Confirms 11/13/19 OMAR and 11/14/19 cardio f/up. Denies questions/immediate needs & verb understanding re sx requiring immediate medical care.  "

## 2019-11-11 NOTE — OUTREACH NOTE
CHF Week 1 Survey      Responses   Facility patient discharged from?  [facility]   Does the patient have one of the following disease processes/diagnoses(primary or secondary)?  CHF   Is there a successful TCM telephone encounter documented?  Yes          Shaina Hendrickson RN

## 2019-11-13 NOTE — PROGRESS NOTES
Subjective   Hugo Colon is a 89 y.o. male.     Chief Complaint   Patient presents with   • Shortness of Breath     hospital follow up        He presents for hospital follow up of afib with rvr. He states he doesn't know of any issues with his heart while he was in the hospital. He reports he had some bleeding from a little place on his left leg that he couldn't get to stop bleeding and he went to the emergency room. He states they got it to stop bleeding. Discharge summary reports the bleeding stopped in the ER and he was treated for afib with RVR. He was put on cardizem drip and his metoprolol was increased to 200mg po daily. Potassium was high but was normal by discharge. Kidney function was stable. WBC was slightly elevated. He brought his medicine from home and has the new dose of metoprolol. He denies any further bleeding. He has a follow up with cardiology tomorrow. He also has history of chf that was treated with lasix during his hospital stay.          The following portions of the patient's history were reviewed and updated as appropriate: allergies, current medications, past family history, past medical history, past social history, past surgical history and problem list.    Review of Systems   Constitutional: Negative for fatigue, fever and unexpected weight change.   HENT: Positive for rhinorrhea. Negative for ear pain, sore throat and trouble swallowing.    Eyes: Negative for discharge, itching and visual disturbance.   Respiratory: Negative for cough, shortness of breath and wheezing.    Cardiovascular: Negative for chest pain and palpitations.   Gastrointestinal: Negative for abdominal pain, blood in stool, constipation, diarrhea, nausea and vomiting.   Genitourinary: Negative for dysuria and hematuria.   Musculoskeletal: Negative for arthralgias and myalgias.   Skin: Negative for color change.   Allergic/Immunologic: Negative for environmental allergies.   Neurological: Negative for dizziness and  "headaches.   Hematological: Negative for adenopathy.   Psychiatric/Behavioral: Negative for sleep disturbance and suicidal ideas. The patient is not nervous/anxious.        Objective     /68 (BP Location: Right arm, Patient Position: Sitting, Cuff Size: Adult)   Pulse 52   Temp 97.2 °F (36.2 °C)   Ht 170.2 cm (67\")   Wt 66.4 kg (146 lb 4.8 oz)   SpO2 99%   BMI 22.91 kg/m²     Physical Exam   Constitutional: He is oriented to person, place, and time. He appears well-developed and well-nourished. No distress.   HENT:   Head: Normocephalic and atraumatic.   Cardiovascular: Normal heart sounds and intact distal pulses. An irregularly irregular rhythm present. Tachycardia present. Exam reveals no gallop and no friction rub.   No murmur heard.  Pulmonary/Chest: Effort normal and breath sounds normal. No respiratory distress.   Abdominal: Soft. Bowel sounds are normal. He exhibits no distension. There is no tenderness.   Neurological: He is alert and oriented to person, place, and time.   Skin: Skin is warm and dry. He is not diaphoretic.   Psychiatric: He has a normal mood and affect. His behavior is normal. Judgment and thought content normal.   Vitals reviewed.      Assessment/Plan     Problem List Items Addressed This Visit        Cardiovascular and Mediastinum    Persistent atrial fibrillation - Primary    Relevant Medications    metoprolol succinate XL (TOPROL-XL) 200 MG 24 hr tablet    atorvastatin (LIPITOR) 10 MG tablet    Other Relevant Orders    ECG 12 Lead      Other Visit Diagnoses     Combined systolic and diastolic congestive heart failure, unspecified HF chronicity (CMS/HCC)        Relevant Medications    metoprolol succinate XL (TOPROL-XL) 200 MG 24 hr tablet    furosemide (LASIX) 20 MG tablet    Hyperkalemia        Relevant Orders    Comprehensive Metabolic Panel    Leukocytosis, unspecified type        Relevant Orders    CBC & Differential    CBC Auto Differential          Plan: Repeat cmp to " recheck potassium and renal function. Repeat cbc to recheck wbc. Get EKG to further assess irregular heart rhythm. Office EKG machine is broken, sent to [facility] for EKG. Continue metoprolol 200mg po daily. Follow up pending EKG results. Pulse ox was 79 upon entering office but came up to 99% after several minutes. It is very cold outside this morning, it could have taken a few minutes to get an accurate reading. Pulse is difficult to determine due to arrhythmia. Keep follow up with cardiology tomorrow.    Patient's Body mass index is 22.91 kg/m². BMI is within normal parameters. No follow-up required..   (Normal BMI:  18.5-24.9, OW 25-29.9, Obesity 30 or greater)        Understands disease processes and need for medications.  Understands reasons for urgent and emergent care.  Patient (& family) verbalized agreement for treatment plan.   Emotional support and active listening provided.  Patient provided time to verbalize feelings.               This document has been electronically signed by JULI Card   November 13, 2019 10:06 AM

## 2019-11-14 NOTE — PROGRESS NOTES
1subjective     Chief Complaint   Patient presents with   • Atrial Fibrillation     S/P [facility] 11-03-19     History of Present Illness  Patient is 89 years old elderly gentleman who has history of chronic atrial fibrillation.  Recently he was admitted to the hospital on November 3, 2019 with atrial fibrillation with rapid ventricular rate.  LV ejection fraction was 36 to 40%.  He also had elevated BNP and was felt that he has decompensated heart failure he diuresed very well and was discharged home.  Patient does have renal failure however he is taking furosemide .  Renal functions will need to be watched.    Patient went to see his PCP yesterday and was found to have atrial fibrillation with rapid ventricular rate of around 130 bpm.  Patient is here for cardiology follow-up.  Patient had seen EP cardiologist in [place] and was told to take metoprolol tartrate 100 twice daily.  Patient apparently never started that.  He was discharged from the hospital with metoprolol  mg daily.  His blood pressure is low today.  Otherwise he has no specific symptoms    Past Surgical History:   Procedure Laterality Date   • EXTERNAL EAR SURGERY      cancer   • HEMORRHOIDECTOMY     • HERNIA REPAIR       Family History   Problem Relation Age of Onset   • Hypertension Sister    • Hyperlipidemia Sister    • Hyperlipidemia Brother    • Hypertension Brother      Past Medical History:   Diagnosis Date   • Atrial fibrillation (CMS/HCC)    • Hyperlipidemia    • Hypertension      Patient Active Problem List   Diagnosis   • Diverticulosis of large intestine   • Dyssomnia   • Persistent atrial fibrillation   • Advanced age   • Other specified hypothyroidism   • Chronic anticoagulation with low-dose Eliquis   • Chronic renal failure, stage 2 (mild)   • Nonrheumatic aortic valve stenosis   • Atrial fibrillation (CMS/HCC)       Social History     Tobacco Use   • Smoking status: Former Smoker     Packs/day: 1.00     Years: 17.00     " Pack years: 17.00     Types: Cigarettes     Start date:      Last attempt to quit: 1963     Years since quittin.9   • Smokeless tobacco: Never Used   Substance Use Topics   • Alcohol use: No   • Drug use: No       Allergies   Allergen Reactions   • Levofloxacin    • Penicillins Other (See Comments)     Made arms blue       Current Outpatient Medications on File Prior to Visit   Medication Sig   • atorvastatin (LIPITOR) 10 MG tablet Take 1 tablet by mouth Every Night.   • furosemide (LASIX) 20 MG tablet Take 1 tablet by mouth 2 (Two) Times a Day.   • Multiple Vitamins-Minerals (MULTIVITAMIN ADULT PO) Take 1 tablet by mouth Daily.   • [DISCONTINUED] apixaban (ELIQUIS) 2.5 MG tablet tablet Take 1 tablet by mouth Every 12 (Twelve) Hours.   • [DISCONTINUED] metoprolol succinate XL (TOPROL-XL) 200 MG 24 hr tablet Take 1 tablet by mouth Daily.     No current facility-administered medications on file prior to visit.          The following portions of the patient's history were reviewed and updated as appropriate: allergies, current medications, past family history, past medical history, past social history, past surgical history and problem list.    Review of Systems   Constitution: Negative.   HENT: Negative.  Negative for congestion.    Eyes: Negative.    Cardiovascular: Positive for palpitations. Negative for chest pain, cyanosis, dyspnea on exertion, irregular heartbeat, leg swelling, near-syncope, orthopnea, paroxysmal nocturnal dyspnea and syncope.   Respiratory: Negative.  Negative for shortness of breath.    Hematologic/Lymphatic: Negative.    Musculoskeletal: Negative.    Gastrointestinal: Negative.    Neurological: Negative.  Negative for headaches.          Objective:     /60   Pulse 82   Ht 170.2 cm (67\")   Wt 66.2 kg (146 lb)   SpO2 98%   BMI 22.87 kg/m²   Physical Exam   Constitutional: He appears well-developed and well-nourished. No distress.   HENT:   Head: Normocephalic and atraumatic. "   Mouth/Throat: Oropharynx is clear and moist. No oropharyngeal exudate.   Eyes: Conjunctivae and EOM are normal. Pupils are equal, round, and reactive to light. No scleral icterus.   Neck: Normal range of motion. Neck supple. No JVD present. No tracheal deviation present. No thyromegaly present.   Cardiovascular: Normal heart sounds and intact distal pulses. An irregularly irregular rhythm present. Tachycardia present. PMI is not displaced. Exam reveals no gallop, no friction rub and no decreased pulses.   No murmur heard.  Pulses:       Carotid pulses are 3+ on the right side, and 3+ on the left side.       Radial pulses are 3+ on the right side, and 3+ on the left side.   Pulmonary/Chest: Effort normal and breath sounds normal. No respiratory distress. He has no wheezes. He has no rales. He exhibits no tenderness.   Abdominal: Soft. Bowel sounds are normal. He exhibits no distension, no abdominal bruit and no mass. There is no splenomegaly or hepatomegaly. There is no tenderness. There is no rebound and no guarding.   Musculoskeletal: Normal range of motion. He exhibits no edema, tenderness or deformity.   Lymphadenopathy:     He has no cervical adenopathy.   Neurological: He is alert. He has normal reflexes. No cranial nerve deficit. He exhibits normal muscle tone. Coordination normal.   Skin: Skin is warm and dry. No rash noted. He is not diaphoretic. No erythema.   Psychiatric: He has a normal mood and affect. His behavior is normal. Judgment and thought content normal.         Lab Review  Lab Results   Component Value Date     11/13/2019    K 4.4 11/13/2019     11/13/2019    BUN 48 (H) 11/13/2019    CREATININE 1.80 (H) 11/13/2019    GLUCOSE 103 (H) 11/13/2019    CALCIUM 9.1 11/13/2019    ALT 22 11/13/2019    ALKPHOS 76 11/13/2019    LABIL2 1.7 11/23/2015     No results found for: CKTOTAL  Lab Results   Component Value Date    WBC 8.15 11/13/2019    HGB 14.1 11/13/2019    HCT 41.8 11/13/2019    PLT  169 11/13/2019     Lab Results   Component Value Date    INR 1.37 (H) 11/03/2019     Lab Results   Component Value Date    MG 2.2 11/03/2019     Lab Results   Component Value Date    PSA 1.870 11/16/2018    TSH 6.790 (H) 11/03/2019     No results found for: BNP  Lab Results   Component Value Date    CHLPL 118 11/23/2015    CHOL 135 05/16/2019    TRIG 62 05/16/2019    HDL 40 05/16/2019    VLDL 12.4 05/16/2019    LDLHDL 2.07 05/16/2019     Lab Results   Component Value Date    LDL 83 05/16/2019    LDL 76 11/16/2018         ECG 12 Lead  Date/Time: 11/14/2019 5:25 PM  Performed by: Samantha Iniguez MD  Authorized by: Samantha Iniguez MD   Comparison: compared with previous ECG from 11/13/2019  Similar to previous ECG  Rhythm: atrial fibrillation  Rate: tachycardic  BPM: 130  Conduction: conduction normal  QRS axis: normal  Other findings: non-specific ST-T wave changes    Clinical impression: abnormal EKG               I personally viewed and interpreted the patient's LAB data         Assessment:     1. Nonrheumatic aortic valve stenosis    2. Persistent atrial fibrillation    3. Chronic anticoagulation with low-dose Eliquis    4. Chronic renal failure, stage 2 (mild)          Plan:     Patient has atrial fibrillation with rapid ventricular rate.    Patient never started metoprolol tartrate 100 twice daily as instructed.  Now he is taking Toprol- once a day.  His blood pressure is low and he has a lot of Toprol-XL available.  He was advised to take Toprol- twice daily    Lanoxin 0.125 mg was added.  Patient has renal failure and advanced age digitoxicity will need to be monitored and digoxin will be discontinued in 2 weeks.  Patient will also need to decrease his Lasix 20 mg 1 a day instead of 2 a day renal functions have significantly worsened.    Eliquis was continued.  Patient will be reevaluated in 2 weeks          No Follow-up on file.

## 2019-11-15 NOTE — OUTREACH NOTE
CHF Week 2 Survey      Responses   Facility patient discharged from?  [facility]   Does the patient have one of the following disease processes/diagnoses(primary or secondary)?  CHF   Week 2 attempt successful?  Yes   Call start time  1048   Call end time  1051   Is patient permission given to speak with other caregiver?  Yes   List who call center can speak with  anyone   Person spoke with today (if not patient) and relationship  anyone   Meds reviewed with patient/caregiver?  Yes   Is the patient taking all medications as directed (includes completed medication regime)?  Yes   Has the patient kept scheduled appointments due by today?  Yes   What is the patient's perception of their health status since discharge?  Improving   Is the patient weighing daily?  No   Does the patient have scales?  Yes   Daily weight interventions  Education provided on importance of daily weight   Is the patient able to teach back Heart Failure Zones?  Yes   CHF Week 2 call completed?  Yes          Aretha Delacruz RN

## 2019-11-22 NOTE — OUTREACH NOTE
CHF Week 3 Survey      Responses   Facility patient discharged from?  [facility]   Does the patient have one of the following disease processes/diagnoses(primary or secondary)?  CHF   Week 3 attempt successful?  No   Unsuccessful attempts  Attempt 1          Malka Lakhani RN

## 2019-11-25 NOTE — OUTREACH NOTE
CHF Week 3 Survey      Responses   Facility patient discharged from?  [facility]   Does the patient have one of the following disease processes/diagnoses(primary or secondary)?  CHF   Week 3 attempt successful?  Yes   Call start time  1225   Call end time  1225   Discharge diagnosis  A. fib with RVR, improved   Is patient permission given to speak with other caregiver?  Yes   List who call center can speak with  son- Saleem   Person spoke with today (if not patient) and relationship  son- Saleem   What is the patient's perception of their health status since discharge?  Improving   Additional teach back comments  Per son, pt is doing well, limited information available.   CHF Week 3 call completed?  Yes   Wrap up additional comments  quick call          Colleen Kruger RN

## 2019-11-26 PROBLEM — N18.30 CHRONIC RENAL FAILURE, STAGE 3 (MODERATE) (HCC): Status: ACTIVE | Noted: 2019-01-01

## 2019-11-26 NOTE — PROGRESS NOTES
subjective     Chief Complaint   Patient presents with   • Atrial Fibrillation   • Med Management     pt states no symptoms from med change. doing well   • Med Refill     pending     History of Present Illness  Patient is 89 years old white male who has history of atrial fibrillation with rapid ventricular rate.  He has been taking Lanoxin 125 mcg every other day, Lasix 20 mg daily metoprolol  twice daily and Eliquis 2.5 twice daily.  He is here for follow-up he has no specific complaints but his heart rate is still quite fast at 120 bpm.  Patient did not bring his medications but he insists that he is taking his medications regularly.  There are no drug side effects.    Past Surgical History:   Procedure Laterality Date   • EXTERNAL EAR SURGERY      cancer   • HEMORRHOIDECTOMY     • HERNIA REPAIR       Family History   Problem Relation Age of Onset   • Hypertension Sister    • Hyperlipidemia Sister    • Hyperlipidemia Brother    • Hypertension Brother      Past Medical History:   Diagnosis Date   • Atrial fibrillation (CMS/HCC)    • Hyperlipidemia    • Hypertension      Patient Active Problem List   Diagnosis   • Diverticulosis of large intestine   • Dyssomnia   • Persistent atrial fibrillation   • Advanced age   • Other specified hypothyroidism   • Chronic anticoagulation with low-dose Eliquis   • Chronic renal failure, stage 3 (moderate) (CMS/HCC)   • Nonrheumatic aortic valve stenosis   • Atrial fibrillation (CMS/HCC)       Social History     Tobacco Use   • Smoking status: Former Smoker     Packs/day: 1.00     Years: 17.00     Pack years: 17.00     Types: Cigarettes     Start date:      Last attempt to quit: 1963     Years since quittin.9   • Smokeless tobacco: Never Used   Substance Use Topics   • Alcohol use: No   • Drug use: No       Allergies   Allergen Reactions   • Penicillins Other (See Comments)     Made arms blue   • Levofloxacin Unknown - Low Severity       Current Outpatient  "Medications on File Prior to Visit   Medication Sig   • Multiple Vitamins-Minerals (MULTIVITAMIN ADULT PO) Take 1 tablet by mouth Daily.   • [DISCONTINUED] apixaban (ELIQUIS) 2.5 MG tablet tablet Take 1 tablet by mouth Every 12 (Twelve) Hours.   • [DISCONTINUED] atorvastatin (LIPITOR) 10 MG tablet Take 1 tablet by mouth Every Night.   • [DISCONTINUED] digoxin (LANOXIN) 125 MCG tablet Take 1 tablet by mouth Every Other Day.   • [DISCONTINUED] furosemide (LASIX) 20 MG tablet Take 1 tablet by mouth 2 (Two) Times a Day. (Patient taking differently: Take 20 mg by mouth Daily.)   • [DISCONTINUED] metoprolol succinate XL (TOPROL-XL) 100 MG 24 hr tablet Take 2 tablets by mouth Daily.     No current facility-administered medications on file prior to visit.          The following portions of the patient's history were reviewed and updated as appropriate: allergies, current medications, past family history, past medical history, past social history, past surgical history and problem list.    Review of Systems   Constitution: Negative.   HENT: Negative.  Negative for congestion.    Eyes: Negative.    Cardiovascular: Negative for chest pain, cyanosis, dyspnea on exertion, irregular heartbeat, leg swelling, near-syncope, orthopnea, palpitations, paroxysmal nocturnal dyspnea and syncope.        Patient has no symptoms, he is not even aware of his heartbeat    Respiratory: Negative.  Negative for shortness of breath.    Hematologic/Lymphatic: Negative.    Musculoskeletal: Negative.    Gastrointestinal: Negative.    Neurological: Negative.  Negative for headaches.          Objective:     /78   Pulse 73   Ht 170.2 cm (67\")   Wt 65.8 kg (145 lb)   SpO2 97%   BMI 22.71 kg/m²   Physical Exam   Constitutional: He appears well-developed and well-nourished. No distress.   HENT:   Head: Normocephalic and atraumatic.   Mouth/Throat: Oropharynx is clear and moist. No oropharyngeal exudate.   Eyes: Conjunctivae and EOM are normal. " Pupils are equal, round, and reactive to light. No scleral icterus.   Neck: Normal range of motion. Neck supple. No JVD present. No tracheal deviation present. No thyromegaly present.   Cardiovascular: Normal heart sounds and intact distal pulses. An irregularly irregular rhythm present. Tachycardia present. PMI is not displaced. Exam reveals no gallop, no friction rub and no decreased pulses.   No murmur heard.  Pulses:       Carotid pulses are 3+ on the right side, and 3+ on the left side.       Radial pulses are 3+ on the right side, and 3+ on the left side.   Pulmonary/Chest: Effort normal and breath sounds normal. No respiratory distress. He has no wheezes. He has no rales. He exhibits no tenderness.   Abdominal: Soft. Bowel sounds are normal. He exhibits no distension, no abdominal bruit and no mass. There is no splenomegaly or hepatomegaly. There is no tenderness. There is no rebound and no guarding.   Musculoskeletal: Normal range of motion. He exhibits no edema, tenderness or deformity.   Lymphadenopathy:     He has no cervical adenopathy.   Neurological: He is alert. He has normal reflexes. No cranial nerve deficit. He exhibits normal muscle tone. Coordination normal.   Skin: Skin is warm and dry. No rash noted. He is not diaphoretic. No erythema.   Psychiatric: He has a normal mood and affect. His behavior is normal. Judgment and thought content normal.         Lab Review  Lab Results   Component Value Date     11/13/2019    K 4.4 11/13/2019     11/13/2019    BUN 48 (H) 11/13/2019    CREATININE 1.80 (H) 11/13/2019    GLUCOSE 103 (H) 11/13/2019    CALCIUM 9.1 11/13/2019    ALT 22 11/13/2019    ALKPHOS 76 11/13/2019    LABIL2 1.7 11/23/2015     No results found for: CKTOTAL  Lab Results   Component Value Date    WBC 8.15 11/13/2019    HGB 14.1 11/13/2019    HCT 41.8 11/13/2019     11/13/2019     Lab Results   Component Value Date    INR 1.37 (H) 11/03/2019     Lab Results   Component  Value Date    MG 2.2 11/03/2019     Lab Results   Component Value Date    PSA 1.870 11/16/2018    TSH 6.790 (H) 11/03/2019     No results found for: BNP  Lab Results   Component Value Date    CHLPL 118 11/23/2015    CHOL 135 05/16/2019    TRIG 62 05/16/2019    HDL 40 05/16/2019    VLDL 12.4 05/16/2019    LDLHDL 2.07 05/16/2019     Lab Results   Component Value Date    LDL 83 05/16/2019    LDL 76 11/16/2018         ECG 12 Lead  Date/Time: 11/26/2019 2:52 PM  Performed by: Samantha Iniguez MD  Authorized by: Samantha Iniguez MD   Comparison: compared with previous ECG from 11/14/2019  Similar to previous ECG  Rhythm: atrial fibrillation  Rate: tachycardic  BPM: 122  Conduction: conduction normal  QRS axis: normal  Other findings: non-specific ST-T wave changes    Clinical impression: abnormal EKG               I personally viewed and interpreted the patient's LAB data         Assessment:     1. Persistent atrial fibrillation    2. Combined systolic and diastolic congestive heart failure, unspecified HF chronicity (CMS/HCC)    3. Nonrheumatic aortic valve stenosis    4. Chronic anticoagulation with low-dose Eliquis    5. Chronic renal failure, stage 3 (moderate) (CMS/HCC)          Plan:     Patient has persistent atrial fibrillation with rapid ventricular rate around 120 bpm.  Patient did not bring his medicines with him but he says that he is taking metoprolol  twice daily and Lanoxin 125 mcg daily  Creatinine is quite high for that reason Lanoxin was discontinued and Lasix was discontinued    Patient is totally asymptomatic however her heartbeat is fast and he needs to be slow down.  Patient was advised to take metoprolol tartrate 150 mg in the morning and 100 mg in the evening.  He will also continue Eliquis 2.5 twice daily.  All other medications were discontinued.  Follow-up in 1 month.        No Follow-up on file.

## 2019-12-02 NOTE — OUTREACH NOTE
CHF Week 4 Survey      Responses   Facility patient discharged from?  [facility]   Does the patient have one of the following disease processes/diagnoses(primary or secondary)?  CHF   Week 4 attempt successful?  Yes   Call start time  1502   Call end time  1503   Is patient permission given to speak with other caregiver?  Yes   List who call center can speak with  cyn- Saleem   Person spoke with today (if not patient) and relationship  cyn- Saleem   Meds reviewed with patient/caregiver?  Yes   Is the patient taking all medications as directed (includes completed medication regime)?  Yes   Has the patient kept scheduled appointments due by today?  Yes   Is the patient still receiving Home Health Services?  Yes   What is the patient's perception of their health status since discharge?  Improving   Is the patient weighing daily?  No   Is the patient able to teach back Heart Failure Zones?  Yes   Is the patient able to teach back signs and symptoms of worsening condition? (i.e. weight gain, shortness of air, etc.)  Yes   Is the patient/caregiver able to teach back the hierarchy of who to call/visit for symptoms/problems? PCP, Specialist, Home health nurse, Urgent Care, ED, 911  Yes   Week 4 Call Completed?  Yes   Would the patient like one additional call?  No   Graduated  Yes   Wrap up additional comments  Son reports he is doing well, f/u with doctors, denies any questions at this time.           Sultana Poole RN

## 2019-12-04 PROBLEM — Z79.01 CHRONIC ANTICOAGULATION: Chronic | Status: ACTIVE | Noted: 2019-01-01

## 2019-12-04 PROBLEM — N18.30 CHRONIC RENAL FAILURE, STAGE 3 (MODERATE) (HCC): Chronic | Status: ACTIVE | Noted: 2019-01-01

## 2019-12-04 PROBLEM — I48.91 ATRIAL FIBRILLATION (HCC): Chronic | Status: ACTIVE | Noted: 2019-01-01

## 2019-12-04 PROBLEM — R54 ADVANCED AGE: Chronic | Status: ACTIVE | Noted: 2019-01-01

## 2019-12-04 PROBLEM — I48.91 A-FIB (HCC): Status: ACTIVE | Noted: 2019-01-01

## 2019-12-05 PROBLEM — I48.19 ATRIAL FIBRILLATION, PERSISTENT (HCC): Chronic | Status: ACTIVE | Noted: 2019-01-01

## 2019-12-05 PROBLEM — R65.10 SIRS (SYSTEMIC INFLAMMATORY RESPONSE SYNDROME) (HCC): Status: ACTIVE | Noted: 2019-01-01

## 2019-12-05 PROBLEM — I48.91 A-FIB (HCC): Chronic | Status: ACTIVE | Noted: 2019-01-01

## 2019-12-05 PROBLEM — E03.8 OTHER SPECIFIED HYPOTHYROIDISM: Chronic | Status: ACTIVE | Noted: 2019-01-01

## 2019-12-05 PROBLEM — I48.19 ATRIAL FIBRILLATION, PERSISTENT (HCC): Status: ACTIVE | Noted: 2019-01-01

## 2019-12-05 PROBLEM — I35.0 NONRHEUMATIC AORTIC VALVE STENOSIS: Chronic | Status: ACTIVE | Noted: 2019-01-01

## 2019-12-05 NOTE — ED NOTES
Pt is leaving the ER at this time en route to [facility]. Pt is leaving A&Ox4, RR even and unlabored, skin WPD. Pt is leaving denying SOA. Pt is leaving with vitals WNL noted to be a-fib on the monitor with HR 88. Pt is leaving with #20 gauge to right ac clean dry, intact, and saline locked. Pt is leaving not appearing in any acute distress with Pt family at bedside.      Erum Ydoer RN  12/04/19 6521

## 2019-12-05 NOTE — ED PROVIDER NOTES
Subjective   Patient is an 89-year-old male who presents the emergency department complaining of shortness of breath, coughing and weakness for the past week.  The patient states that he has not had any fever.  The patient is a very reluctant historian is difficult to obtain the full history.  He states that his symptoms started a week ago and when I asked him about it he says that they are getting progressively worse.  He also mentions that he has had a cough but states that he has not had any sputum production.  The patient denies any changes in appetite, chest pain, abdominal pain or dizziness.  The patient does have history of atrial fibrillation and is on Eliquis 2.5 mg twice daily.  He does mention that he has had some chills over the past couple of days.  Currently he has no complaints.  Laying down does not seem to make his symptoms better or worse.  Strenuous activity does make his shortness of breath slightly worse.  He reports no other symptoms at this time.            Review of Systems   Constitutional: Negative for activity change, appetite change, chills, diaphoresis, fatigue and fever.   HENT: Negative for congestion, postnasal drip, rhinorrhea, sinus pressure, sinus pain, sneezing and sore throat.    Eyes: Negative for pain, discharge, redness and itching.   Respiratory: Positive for cough and shortness of breath. Negative for apnea, choking, chest tightness, wheezing and stridor.    Cardiovascular: Negative for chest pain, palpitations and leg swelling.   Gastrointestinal: Negative for abdominal distention, abdominal pain, constipation, diarrhea, nausea and vomiting.   Genitourinary: Negative for difficulty urinating and flank pain.   Musculoskeletal: Negative for arthralgias and back pain.   Neurological: Negative for dizziness, facial asymmetry and headaches.   Psychiatric/Behavioral: Negative for agitation, behavioral problems, confusion and decreased concentration.       Past Medical History:    Diagnosis Date   • Atrial fibrillation (CMS/HCC)    • Chronic anticoagulation with low-dose Eliquis 2019   • Chronic renal failure, stage 3 (moderate) (CMS/HCC) 2019   • Diverticulosis of large intestine 2016   • Dyssomnia 2016   • Hyperlipidemia    • Hypertension    • Nonrheumatic aortic valve stenosis 2019   • Systolic CHF (CMS/HCC)        Allergies   Allergen Reactions   • Penicillins Other (See Comments)     Made arms blue   • Levofloxacin Unknown - Low Severity       Past Surgical History:   Procedure Laterality Date   • EXTERNAL EAR SURGERY      cancer   • HEMORRHOIDECTOMY     • HERNIA REPAIR         Family History   Problem Relation Age of Onset   • Hypertension Sister    • Hyperlipidemia Sister    • Hyperlipidemia Brother    • Hypertension Brother        Social History     Socioeconomic History   • Marital status:      Spouse name: Not on file   • Number of children: Not on file   • Years of education: Not on file   • Highest education level: Not on file   Tobacco Use   • Smoking status: Former Smoker     Packs/day: 1.00     Years: 17.00     Pack years: 17.00     Types: Cigarettes     Start date:      Last attempt to quit: 1963     Years since quittin.9   • Smokeless tobacco: Never Used   Substance and Sexual Activity   • Alcohol use: No   • Drug use: No   • Sexual activity: Defer     Comment: , has step children           Objective   Physical Exam   Constitutional: He is oriented to person, place, and time. He appears well-developed and well-nourished.  Non-toxic appearance. He does not appear ill. No distress. He is not intubated.   Elderly and frail   HENT:   Head: Normocephalic and atraumatic.   Mouth/Throat: Oropharynx is clear and moist. No oropharyngeal exudate or posterior oropharyngeal edema.   Eyes: EOM are normal. Pupils are equal, round, and reactive to light.   Neck: Normal range of motion. Neck supple. No hepatojugular reflux and no JVD present. No  tracheal deviation present. No thyromegaly present.   Cardiovascular: Normal rate, regular rhythm, normal heart sounds and intact distal pulses.  No extrasystoles are present. Exam reveals no gallop, no friction rub and no decreased pulses.   No murmur heard.  Pulmonary/Chest: Effort normal. No accessory muscle usage or stridor. No apnea, no tachypnea and no bradypnea. He is not intubated. No respiratory distress. He has decreased breath sounds in the right upper field, the right middle field, the right lower field, the left upper field, the left middle field and the left lower field. He has no wheezes. He has no rhonchi. He has no rales. He exhibits no mass, no tenderness, no laceration, no crepitus, no edema, no deformity, no swelling and no retraction.   Abdominal: Soft. Bowel sounds are normal. He exhibits no distension, no ascites and no mass. There is no splenomegaly or hepatomegaly. There is no tenderness. There is no rebound and no guarding.   Musculoskeletal: Normal range of motion.        Right lower leg: Normal. He exhibits no tenderness and no edema.        Left lower leg: Normal. He exhibits no tenderness and no edema.   Lymphadenopathy:     He has no cervical adenopathy.   Neurological: He is alert and oriented to person, place, and time. He is not disoriented. No cranial nerve deficit.   Skin: Skin is warm and dry. Capillary refill takes less than 2 seconds. No rash noted. He is not diaphoretic. No erythema. No pallor.   Psychiatric: He has a normal mood and affect. His behavior is normal. His mood appears not anxious. He is not agitated.   Nursing note and vitals reviewed.      Procedures           ED Course  ED Course as of Dec 04 2240   Wed Dec 04, 2019   2208 Impression    Improvement in CHF but persistent changes of edema are noted  with small pleural effusions.     XR Chest 2 View [ES]      ED Course User Index  [ES] Rinku Gomez MD                  MDM  Number of Diagnoses or  Management Options  Acute on chronic systolic congestive heart failure (CMS/HCC): new and requires workup  Atrial fibrillation with RVR (CMS/HCC): new and requires workup     Amount and/or Complexity of Data Reviewed  Clinical lab tests: ordered and reviewed  Tests in the radiology section of CPT®: ordered and reviewed  Tests in the medicine section of CPT®: ordered and reviewed  Decide to obtain previous medical records or to obtain history from someone other than the patient: yes  Discuss the patient with other providers: yes  Independent visualization of images, tracings, or specimens: yes    Risk of Complications, Morbidity, and/or Mortality  Presenting problems: high  Diagnostic procedures: high  Management options: high    Critical Care  Total time providing critical care: < 30 minutes    Patient Progress  Patient progress: stable      Final diagnoses:   Atrial fibrillation with RVR (CMS/HCC)   Acute on chronic systolic congestive heart failure (CMS/HCC)              Nadiya Mendoza DO  12/04/19 1591

## 2019-12-05 NOTE — PROGRESS NOTES
Patient seen and examined, currently he is eating lunch, he feels much better, he is diuresed after diuretics.  Patient is admitted because of atrial fibrillation with rapid ventricular response.    -Very early acute on chronic systolic and diastolic heart failure improved ejection fraction of 36 to 40%  -Chronic persistent atrial fibrillation with RVR now rate controlled  -Moderate aortic valve stenosis  -CKD stage III  -Chronic anticoagulation with chads vas score at least 4  -Moderate pulmonary hypertension    Lopressor has been increased to 150 mg twice daily, continue telemetry, gentle diuresis PRN, continue telemetry.  Activity as tolerated.

## 2019-12-05 NOTE — H&P
[facility] Medicine Services  HISTORY & PHYSICAL    Patient Identification:  Name:  Hugo Colon  Age:  89 y.o.  Sex:  male  :  1930  MRN:  [id number]   Visit Number:  [id number]  Primary Care Physician:  Sincere Jeffries MD     Subjective     Chief complaint:   Chief Complaint   Patient presents with   • Shortness of Breath     History of presenting illness:   Patient is a 89 y.o. male with past medical history significant for persistent atrial fibrillation chronically anticoagulated on Eliquis, systolic and diastolic congestive heart failure (EF 36-40%), chronic kidney disease stage III, hypothyroidism, essential hypertension, and hyperlipidemia, that presented to the [facility] emergency department for evaluation of shortness of breath.  Patient states that he has been experiencing shortness of breath for at least 2 weeks that has been getting progressively worse.  He states that it is made worse by ambulating and relieved with rest.  He also admits to intermittent chills, leg edema, lightheadedness when going from a sitting to standing position, fatigue, constipation and palpitations.  He denies change in activity, decreased appetite, fever, congestion, sore throat, chest tightness, choking on foods, cough, orthopnea, wheezing, chest pain, abdominal pain, nausea, vomiting, diarrhea or dysuria, difficulty with gait/ambulation, wounds, dizziness.  Patient has a history of persistent atrial fibrillation chronically anticoagulated on Eliquis.  He recently saw Dr. Iniguez on 2019 who started the patient on metoprolol  twice a day and Lanoxin 0.125 added.  Dr. Iniguez additionally decreased the patient's Lasix to 20 mg once a day instead of twice a day in the setting of renal failure and advanced age, Eliquis was continued.  The patient has an appointment coming up on 2019 with Dr. Iniguez and another appointment on 1/10/2020 with Dr. Arias, EP  cardiology, [facility].  He denies any chest pain at time of evaluation and states that his breathing is much improved since arriving to the hospital.    Upon arrival to the ED, vitals were temperature 97.5 F, pulse 109, RR 20, /77, SpO2 97% on room air. ABG with pH 7.426, pCO2 26.3, pO2 110.0, HCO3 17.3. Troponin negative x 2. proBNP 9847.0. CMP with glucose 120, potassium 5.3, CO2 17.2, anion gap 18.8, creatinine 1.69, BUN 46, BUN/creatinine ratio 27.2, GFR 38, ALT 81, , total bilirubin 1.3.  TSH 9.650.  CRP 0.92, lactate 2.0.  Magnesium 2.6.  Pro time 22.6, INR 1.89.  CBC with hematocrit 53.5, MCV 98.3, MCHC 31.2.  Influenza A and B-.  Blood cultures pending x2.  X-ray shows improvement in CHF but persistent changes of edema are noted with small pleural effusions.  CT of chest without contrast shows bilateral pleural effusions right greater than left with probable compressive atelectasis in both lung bases, no evidence of consolidative pneumonia.  EKG shows A. fib with RVR, right axis deviation, nonspecific ST and T wave abnormalities, heart rate 106, QTc 499.  Patient received IV Bumex 1 mg, IV Cardizem 5 mg, in emergency department.    Patient has been admitted to the telemetry floor for further evaluation and treatment.   ---------------------------------------------------------------------------------------------------------------------   Review of Systems   Constitutional: Positive for chills and fatigue. Negative for activity change, appetite change and fever.        Denies sick contacts   HENT: Negative for congestion and sore throat.    Eyes: Negative for visual disturbance.   Respiratory: Positive for shortness of breath (With ambulation). Negative for cough, choking, chest tightness and wheezing.    Cardiovascular: Positive for palpitations and leg swelling (Intermittent). Negative for chest pain.   Gastrointestinal: Negative for abdominal pain, constipation, diarrhea, nausea  and vomiting.   Genitourinary: Negative for dysuria and frequency.   Musculoskeletal: Negative for arthralgias and gait problem.   Skin: Negative for wound.   Allergic/Immunologic: Negative for food allergies.   Neurological: Negative for dizziness and light-headedness.   Hematological: Bruises/bleeds easily (Chronically anticoagulated on Eliquis).   Psychiatric/Behavioral: Negative for confusion.      ---------------------------------------------------------------------------------------------------------------------   Past Medical History:   Diagnosis Date   • Atrial fibrillation (CMS/[facility])    • Chronic anticoagulation with low-dose Eliquis 2019   • Chronic renal failure, stage 3 (moderate) (CMS/[facility]) 2019   • Diverticulosis of large intestine 2016   • Dyssomnia 2016   • Hyperlipidemia    • Hypertension    • Nonrheumatic aortic valve stenosis 2019   • Systolic CHF (CMS/[facility])      Past Surgical History:   Procedure Laterality Date   • EXTERNAL EAR SURGERY      cancer   • HEMORRHOIDECTOMY     • HERNIA REPAIR       Family History   Problem Relation Age of Onset   • Hypertension Sister    • Hyperlipidemia Sister    • Hyperlipidemia Brother    • Hypertension Brother      Social History     Socioeconomic History   • Marital status:      Spouse name: Not on file   • Number of children: Not on file   • Years of education: Not on file   • Highest education level: Not on file   Tobacco Use   • Smoking status: Former Smoker     Packs/day: 1.00     Years: 17.00     Pack years: 17.00     Types: Cigarettes     Start date:      Last attempt to quit: 1963     Years since quittin.9   • Smokeless tobacco: Never Used   Substance and Sexual Activity   • Alcohol use: No   • Drug use: No   • Sexual activity: Defer     Comment: , has step children     ---------------------------------------------------------------------------------------------------------------------   Allergies:  Penicillins and  Levofloxacin  ---------------------------------------------------------------------------------------------------------------------   Medications below are reported home medications pulling from within the system; at this time, these medications have not been reconciled unless otherwise specified and are in the verification process for further verifcation as current home medications.    Prior to Admission Medications     Prescriptions Last Dose Informant Patient Reported? Taking?    apixaban (ELIQUIS) 2.5 MG tablet tablet 12/4/2019 Self No Yes    Take 1 tablet by mouth Every 12 (Twelve) Hours.    atorvastatin (LIPITOR) 10 MG tablet 12/3/2019 Self No Yes    Take 1 tablet by mouth Every Night.    metoprolol tartrate (LOPRESSOR) 100 MG tablet 12/4/2019 Self No Yes    Take 1.5 tablets by mouth 2 (Two) Times a Day.    Multiple Vitamins-Minerals (MULTIVITAMIN ADULT PO) 12/4/2019 Self Yes Yes    Take 1 tablet by mouth Daily.        ---------------------------------------------------------------------------------------------------------------------    Objective     Hospital Scheduled Meds:    apixaban 2.5 mg Oral Q12H   aspirin 325 mg Oral Once   atorvastatin 10 mg Oral Nightly   I-kenya 1 tablet Oral Daily   metoprolol tartrate 150 mg Oral BID   sodium chloride 10 mL Intravenous Q12H          Current listed hospital scheduled medications may not yet reflect those currently placed in orders that are signed and held, awaiting patient's arrival to floor/unit.    ---------------------------------------------------------------------------------------------------------------------   Vital Signs:  Temp:  [97.4 °F (36.3 °C)-97.8 °F (36.6 °C)] 97.8 °F (36.6 °C)  Heart Rate:  [] 99  Resp:  [18-20] 18  BP: (100-129)/() 129/80  Mean Arterial Pressure (Non-Invasive) for the past 24 hrs (Last 3 readings):   Noninvasive MAP (mmHg)   12/04/19 2340 102   12/04/19 2301 92   12/04/19 2247 85     SpO2 Percentage    12/04/19 2247  12/04/19 2301 12/04/19 2340   SpO2: 97% 95% 99%     SpO2:  [95 %-100 %] 99 %  on   ;        Body mass index is 23.27 kg/m².  Wt Readings from Last 3 Encounters:   12/04/19 67.4 kg (148 lb 9.6 oz)   11/26/19 65.8 kg (145 lb)   11/14/19 66.2 kg (146 lb)     ---------------------------------------------------------------------------------------------------------------------   Physical Exam:  Physical Exam   Constitutional: He is oriented to person, place, and time. He appears well-developed and well-nourished. He is cooperative.   Upon evaluation patient lying in bed in no acute distress, sleeping, easily aroused.   HENT:   Head: Normocephalic and atraumatic.   Nose: Nose normal. No nasal deformity.   Eyes: Conjunctivae and lids are normal. Right eye exhibits no discharge. Left eye exhibits no discharge. Right conjunctiva is not injected. Left conjunctiva is not injected.   Neck: No JVD present. Carotid bruit is not present.   Cardiovascular: Normal heart sounds, intact distal pulses and normal pulses. An irregularly irregular rhythm present. Tachycardia present. Exam reveals no decreased pulses.   Pulses:       Dorsalis pedis pulses are 2+ on the right side, and 2+ on the left side.        Posterior tibial pulses are 2+ on the right side, and 2+ on the left side.   Pulmonary/Chest: Effort normal and breath sounds normal. No tachypnea. He has no decreased breath sounds. He has no wheezes. He has no rhonchi. He has no rales.   Abdominal: Soft. Normal appearance and bowel sounds are normal. He exhibits no distension and no mass. There is no tenderness.   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        Right lower leg: He exhibits no edema.        Left lower leg: He exhibits no edema.     Vascular Status -  His right foot exhibits normal foot vasculature  and no edema. His left foot exhibits normal foot vasculature  and no edema.  Skin  Integrity  -  His right foot skin is intact.His left foot skin is intact..  Neurological: He is alert and oriented to person, place, and time. He has normal strength. He is not disoriented. He displays no atrophy. No sensory deficit.   No focal neurological deficits.  Patient alert and oriented to time, place, himself, president.  Station intact bilaterally in upper and lower extremities.  Strength intact bilaterally in upper and lower extremities.  Patient able to move arms and legs equal bilaterally.   Skin: Ecchymosis (Bilateral forearms) noted. No lesion and no rash noted.   Psychiatric: He has a normal mood and affect. His speech is normal. Cognition and memory are normal. Cognition and memory are not impaired.     ---------------------------------------------------------------------------------------------------------------------  EKG:    Pending cardiology read, per my evaluation atrial fibrillation with RVR, right axis deviation, non-specific ST and T wave abnormalities, no ischemia, , QTc 499.     Telemetry:    Atrial fibrillation with PVCs, HR 95 bpm.     I have personally reviewed the EKG/Telemetry strip  ---------------------------------------------------------------------------------------------------------------------   Results from last 7 days   Lab Units 12/04/19 2044 12/04/19  1845   TROPONIN T ng/mL <0.010 <0.010     Results from last 7 days   Lab Units 12/04/19  1845   PROBNP pg/mL 9,847.0*       Results from last 7 days   Lab Units 12/04/19 2053   PH, ARTERIAL pH units 7.426   PO2 ART mm Hg 110.0*   PCO2, ARTERIAL mm Hg 26.3*   HCO3 ART mmol/L 17.3*     Results from last 7 days   Lab Units 12/04/19 2107 12/04/19 2044 12/04/19  1845   CRP mg/dL  --   --  0.92*   LACTATE mmol/L  --  2.0  --    WBC 10*3/mm3  --   --  10.32   HEMOGLOBIN g/dL  --   --  16.7   HEMATOCRIT %  --   --  53.5*   MCV fL  --   --  98.3*   MCHC g/dL  --   --  31.2*   PLATELETS 10*3/mm3  --   --  223   INR  1.89*  --    --      Results from last 7 days   Lab Units 12/04/19  1845   SODIUM mmol/L 140   POTASSIUM mmol/L 5.3*   MAGNESIUM mg/dL 2.6*   CHLORIDE mmol/L 104   CO2 mmol/L 17.2*   BUN mg/dL 46*   CREATININE mg/dL 1.69*   EGFR IF NONAFRICN AM mL/min/1.73 38*   CALCIUM mg/dL 10.0   GLUCOSE mg/dL 120*   ALBUMIN g/dL 4.46   BILIRUBIN mg/dL 1.3*   ALK PHOS U/L 117   AST (SGOT) U/L 110*   ALT (SGPT) U/L 81*   Estimated Creatinine Clearance: 28.2 mL/min (A) (by C-G formula based on SCr of 1.69 mg/dL (H)).  No results found for: AMMONIA    No results found for: HGBA1C, POCGLU  No results found for: HGBA1C  Lab Results   Component Value Date    TSH 9.650 (H) 12/04/2019    FREET4 1.23 11/03/2019        Pain Management Panel     There is no flowsheet data to display.        I have personally reviewed the above laboratory results.   ---------------------------------------------------------------------------------------------------------------------  Imaging Results (Last 7 Days)     Procedure Component Value Units Date/Time    CT Chest Without Contrast [[id number]] Collected:  12/04/19 2159     Updated:  12/04/19 2202    Narrative:       CT Chest WO    INDICATION:   Shortness of air and cough. Evaluate for pneumonia.    TECHNIQUE:   CT of the thorax without IV contrast. Coronal and sagittal reconstructions were obtained.  Radiation dose reduction techniques included automated exposure control or exposure modulation based on body size. Count of known CT and cardiac nuc med studies  performed in previous 12 months: 0.     COMPARISON:   Two-view chest 12/4/2019    FINDINGS:  Small left and moderate sized right pleural effusion with fluid layering to the depth of close to 5 cm on the right and about 2.5 cm on the left. Compressive atelectasis in both lung bases. Some associated pneumonitis not excluded. Mild background  centrilobular emphysema.    Cardiomegaly without pericardial effusion. Coronary artery calcifications. Aorta and pulmonary  vessels unremarkable. Calcified left hilar nodes. Small amount of noncalcified mediastinal lymphadenopathy. Upper abdomen unremarkable. Osseous structures and  soft tissues are unremarkable.      Impression:       Bilateral pleural effusions right greater than left with probable compressive atelectasis in both lung bases. No convincing evidence of consolidative pneumonia.    Background emphysema.    Signer Name: KRISHNA Boss MD   Signed: 12/4/2019 9:59 PM   Workstation Name: [facility]    [facility]    XR Chest 2 View [[id number]] Collected:  12/04/19 1836     Updated:  12/04/19 1838    Narrative:       EXAMINATION: XR CHEST 2 VW-      CLINICAL INDICATION:     Chest Pain Triage Protocol     TECHNIQUE:  XR CHEST 2 VW-      COMPARISON: 11/03/2019      FINDINGS:   Improvement in CHF. Persistent basilar airspace disease with small  pleural effusions. Cardiomegaly. No significant interstitial edema.  Pulmonary vascularity appears within normal limits.       Impression:       Improvement in CHF but persistent changes of edema are noted  with small pleural effusions.     This report was finalized on 12/4/2019 6:36 PM by Dr. Satish Quinn MD.           I have personally reviewed the above radiology results.     Last Echocardiogram:  Results for orders placed during the hospital encounter of 11/03/19   Adult Transthoracic Echo Complete W/ Cont if Necessary Per Protocol    Narrative · Left ventricular systolic function is moderately decreased.  · Estimated EF appears to be in the range of 36 - 40%.  · Moderateleft ventricular global hypokinesis is noted.  · Normal cardiac chamber dimensions.  · Mild aortic valve regurgitation is present.  · Moderate calcific aortic valve stenosis is present.  · (peak gradient = 28 mmHg and mean gradient = 18 mmHg and there under   estimated due to the reduced LVEF.)  · Mild-to-moderate mitral valve regurgitation is present  · Mild tricuspid valve regurgitation  is present.  · Estimated right ventricular systolic pressure from tricuspid   regurgitation is moderately elevated (45-55 mmHg). Moderate pulmonary   hypertension is present  · There is no evidence of pericardial effusion  · Evidence of left sided pleural effusion is present..  · Comparedto the study done 7/16/2019, severity of LV systolic dysfunction   and I aortic stenosis is essentially unchanged.        ---------------------------------------------------------------------------------------------------------------------    Assessment & Plan      Active Hospital Problems    Diagnosis POA   • **Atrial fibrillation, persistent with RVR  [I48.19] Yes   • SIRS (systemic inflammatory response syndrome) (CMS/HCC) [R65.10] Yes   • Nonrheumatic aortic valve stenosis [I35.0] Yes   • Chronic anticoagulation with low-dose Eliquis [Z79.01] Not Applicable   • Chronic renal failure, stage 3 (moderate) (CMS/HCC) [N18.3] Yes   • Other specified hypothyroidism [E03.8] Yes   • Advanced age [R54] Yes     · SIRS with persistent atrial fibrillation with RVR, chronically anticoagulated on Eliquis (, RR 20, lactate 2.0, no source of infection): [id number]. Patient currently in atrial fibrillation with PVCs, HR 95 bpm, chest pain free. Continue patient home metoprolol. Patient received IV cardizem in ED and HR 80s with improved symptoms. If HR >100, consider restarting IV cardizem. Continue home Eliquis.  Cardiology consulted, assistance is much appreciated.  Repeat a.m. CRP and lactate improved.  (CRP 0.82, lactate 1.7).  Monitor closely on telemetry.     · Hyperglycemia with no known history of diabetes mellitus: obtain hemoglobin A1c. Anion gap elevated at 18.8, now improved at 16.0.  Blood acetone levels negative.  Will add SSI if necessary. Continue to monitor closely.     · Respiratory alkalosis with metabolic acidosis: anion gap 18.0 on admission, improved at 16.0 now. Blood acetone levels negative.     · Mild  hyperkalemia (5.3): repeat AM CMP. Monitor closely. If continue to increase, will give kayexlate or other potassium lower agents depending on the level.     · Mild hypermagnesemia (2.6): repeat AM mag. If continue to increase, will consider magnesium lower agents. Continue to monitor closely.     · Transaminitis: acute hepatitis panel ordered. Avoid hepatotoxic agents as much as possible. Repeat AM CMP. Continue to monitor closely.     · Prolonged QTc (499 ms): avoid QTc prolonging agents. AM mag, phos. Monitor closely.     · History of systolic and diastolic CHF with elevated proBNP on admission (9847.0): appears compensated. Echo on 11/3/19 showed EF 36-40%. CXR and CT shows small bilateral pleural effusions, right greater than left, no pneumonia present.  Daily weights, strict I's and O's.  Watch for volume overload.  Continue to monitor closely.     · Macrocytosis without anemia: H&H stable, repeat AM CBC, monitor H&H closely. Obtain B12, folate, replace as necessary. If hemoglobin <7 will transfuse.     · Chronic kidney disease stage III: baseline creatinine 1.4-1.8, creatine on admission 1.64. Avoid nephrotoxic agents as much as possible. Repeat am CMP. Continue to monitor closely.     · Hypothyroidism: TSH 9.650. Obtain free T4. Patient not currently taking medication. Recommend outpatient follow up with PCP.     · Essential hypertension, appears controlled: continue home metoprolol.  Dosing of metoprolol recently changed by Dr. Iniguez 200 mg twice daily in setting of hypotension.  Continue to monitor blood pressure.     · Hyperlipidemia: continue home statin.     · Advanced age: fall precautions in place, up with assistance     · F/E/N: No IV fluids. Replace electrolytes as necessary per protocol. Cardiac diet.     ---------------------------------------------------  DVT Prophylaxis: Eliquis  GI Prophylaxis: Protonix   Activity: up with assistance, fall precautions     The patient is considered to be a high  risk patient due to: Sirs criteria, atrial fibrillation with persistent RVR, hyperglycemia, mild hyperkalemia, mild hypomagnesemia, transaminitis.     INPATIENT status due to the need for care which can only be reasonably provided in an hospital setting such as aggressive/expedited ancillary services and/or consultation services, the necessity for IV medications, close physician monitoring and/or the possible need for procedures.  In such, I feel patient’s risk for adverse outcomes and need for care warrant INPATIENT evaluation and predict the patient’s care encounter to likely last beyond 2 midnights.      Code Status: FULL CODE     I have discussed the patient's assessment and plan with the patient, TABITHA Dumont, and attending physician.       Ludmila España PA-C  Hospitalist Service -- [facility]       12/05/19  3:59 AM    Attending Physician: Marc Jaramillo MD

## 2019-12-05 NOTE — CONSULTS
Inpatient Cardiology Consult  Consult performed by: Ovidio Paris MD  Consult ordered by: Ludmila España PA-C        Date of Admit: 12/4/2019  Date of Consult: 12/05/19  No ref. provider found  Hugo Colon  2/28/1930    Consulting Physician: Ovidio Paris MD    Cardiology consultation    Reason for consultation: Atrial fibrillation with RVR    Assessment:  1. Persistent atrial fibrillation with RVR  2. Chronic systolic and diastolic heart failure  3. Moderate aortic valve stenosis, needs evaluation for low-flow low gradient  4. Chronic kidney disease, Stage III      Recommendations:  1. With regard to atrial fibrillation with RVR, dose of metoprolol tartrate has been increased to 150 mg twice daily.  Pt  Is not a candidate for digoxin due to kidney function.    2. CHADsVASc is at least 4 for hypertension, heart failure and age.  He is chronically anticoagulated on Eliquis 2.5 mg twice daily.  Dose adjusted for creatinine > 1.5 and age.  3. With regard to patient's systolic and diastolic heart failure, he does appear to have a mild exacerbation, perhaps secondary to his elevated heart rate.  Agree with gentle diuresis, monitor kidney function.        History of Present Illness      Subjective     Chief Complaint   Patient presents with   • Shortness of Breath       Hugo Colon is a 89 y.o. male with past medical history significant for persistent atrial fibrillation, chronic anticoagulation on Eliquis, systolic and diastolic heart failure with LVEF of 36 to 40%, moderate aortic valve stenosis, essential hypertension, hyperlipidemia, stage II chronic kidney disease, and hypothyroidism.  He presented to the ED on 12/4/2019 with report of increasing worsening shortness of breath.  He was admitted with atrial fibrillation with RVR.  Cardiology was consulted for same.    Pt. Seen and examined.  He states that he is breathing better this morning. He denies chest pain.    He reports that over  the last several days he became more and more short of breath.  He then went to the ED for evaluation.      He denies past medical history of CAD, he reports long standing history of atrial fibrillation.       Last Echo: 11/4/2019    Interpretation Summary     · Left ventricular systolic function is moderately decreased.  · Estimated EF appears to be in the range of 36 - 40%.  · Moderateleft ventricular global hypokinesis is noted.  · Normal cardiac chamber dimensions.  · Mild aortic valve regurgitation is present.  · Moderate calcific aortic valve stenosis is present.  · (peak gradient = 28 mmHg and mean gradient = 18 mmHg and there under estimated due to the reduced LVEF.)  · Mild-to-moderate mitral valve regurgitation is present  · Mild tricuspid valve regurgitation is present.  · Estimated right ventricular systolic pressure from tricuspid regurgitation is moderately elevated (45-55 mmHg). Moderate pulmonary hypertension is present  · There is no evidence of pericardial effusion  · Evidence of left sided pleural effusion is present..  · Comparedto the study done 7/16/2019, severity of LV systolic dysfunction and I aortic stenosis is essentially unchanged.     Last Stress:     Last Cath:      Past Medical History:   Diagnosis Date   • Atrial fibrillation (CMS/HCC)    • Chronic anticoagulation with low-dose Eliquis 7/1/2019   • Chronic renal failure, stage 3 (moderate) (CMS/HCC) 7/1/2019   • Diverticulosis of large intestine 7/19/2016   • Dyssomnia 7/19/2016   • Hyperlipidemia    • Hypertension    • Nonrheumatic aortic valve stenosis 7/29/2019   • Systolic CHF (CMS/HCC)      Past Surgical History:   Procedure Laterality Date   • EXTERNAL EAR SURGERY      cancer   • HEMORRHOIDECTOMY     • HERNIA REPAIR       Family History   Problem Relation Age of Onset   • Hypertension Sister    • Hyperlipidemia Sister    • Hyperlipidemia Brother    • Hypertension Brother      Social History     Tobacco Use   • Smoking status:  Former Smoker     Packs/day: 1.00     Years: 17.00     Pack years: 17.00     Types: Cigarettes     Start date:      Last attempt to quit: 1963     Years since quittin.9   • Smokeless tobacco: Never Used   Substance Use Topics   • Alcohol use: No   • Drug use: No     Medications Prior to Admission   Medication Sig Dispense Refill Last Dose   • apixaban (ELIQUIS) 2.5 MG tablet tablet Take 1 tablet by mouth Every 12 (Twelve) Hours. 180 tablet 3 2019 at 0700   • atorvastatin (LIPITOR) 10 MG tablet Take 1 tablet by mouth Every Night. 90 tablet 3 12/3/2019 at Unknown time   • metoprolol tartrate (LOPRESSOR) 100 MG tablet Take 1.5 tablets by mouth 2 (Two) Times a Day. 180 tablet 3 2019 at 0700   • Multiple Vitamins-Minerals (MULTIVITAMIN ADULT PO) Take 1 tablet by mouth Daily.   2019 at 0700     Allergies:  Penicillins and Levofloxacin    Review of Systems   Constitutional: Positive for fatigue.   Respiratory: Positive for shortness of breath.    Cardiovascular: Negative for chest pain, palpitations and leg swelling.   Gastrointestinal: Negative for blood in stool.   Neurological: Negative for dizziness, syncope, weakness and light-headedness.   Hematological: Does not bruise/bleed easily.   All other systems reviewed and are negative.        Objective      Vital Signs  Temp:  [97.4 °F (36.3 °C)-98.1 °F (36.7 °C)] 98.1 °F (36.7 °C)  Heart Rate:  [] 120  Resp:  [18-20] 18  BP: (100-129)/() 129/75  Body mass index is 23.27 kg/m².    Intake/Output Summary (Last 24 hours) at 2019 0915  Last data filed at 2019 0300  Gross per 24 hour   Intake --   Output 700 ml   Net -700 ml       Physical Exam   Constitutional: He is oriented to person, place, and time. He appears well-developed and well-nourished.   HENT:   Head: Normocephalic and atraumatic.   Eyes: Pupils are equal, round, and reactive to light.   Neck: No JVD present.   Cardiovascular: Intact distal pulses. An irregularly  irregular rhythm present. Tachycardia present. Exam reveals no gallop and no friction rub.   No murmur heard.  No lower extremity edema   Pulmonary/Chest: Effort normal. No respiratory distress. He has decreased breath sounds (light crackles noted in bases bilaterally). He has no wheezes. He has no rales.   Abdominal: Soft. He exhibits no mass. There is no tenderness. No hernia.   Neurological: He is alert and oriented to person, place, and time.   Skin: Skin is warm and dry.   Psychiatric: He has a normal mood and affect.   Vitals reviewed.      Telemetry:  A fib 100-120s    Results Review:   I reviewed the patient's new clinical results.  Results from last 7 days   Lab Units 12/04/19  2044 12/04/19  1845   TROPONIN T ng/mL <0.010 <0.010     Results from last 7 days   Lab Units 12/04/19  1845   WBC 10*3/mm3 10.32   HEMOGLOBIN g/dL 16.7   PLATELETS 10*3/mm3 223     Results from last 7 days   Lab Units 12/05/19  0411 12/04/19  1845   SODIUM mmol/L 141 140   POTASSIUM mmol/L 4.7 5.3*   CHLORIDE mmol/L 107 104   CO2 mmol/L 18.0* 17.2*   BUN mg/dL 48* 46*   CREATININE mg/dL 1.67* 1.69*   CALCIUM mg/dL 8.8 10.0   GLUCOSE mg/dL 84 120*   ALT (SGPT) U/L 72* 81*   AST (SGOT) U/L 73* 110*     Lab Results   Component Value Date    INR 1.89 (H) 12/04/2019    INR 1.37 (H) 11/03/2019     Lab Results   Component Value Date    MG 2.6 (H) 12/04/2019    MG 2.2 11/03/2019     Lab Results   Component Value Date    TSH 9.650 (H) 12/04/2019    PSA 1.870 11/16/2018    CHLPL 118 11/23/2015    TRIG 50 12/05/2019    HDL 31 (L) 12/05/2019    LDL 32 12/05/2019      No results found for: BNP     EKG:         Imaging Results (Last 72 Hours)     Procedure Component Value Units Date/Time    CT Chest Without Contrast [[id number]] Collected:  12/04/19 2159     Updated:  12/04/19 2202    Narrative:       CT Chest WO    INDICATION:   Shortness of air and cough. Evaluate for pneumonia.    TECHNIQUE:   CT of the thorax without IV contrast. Coronal and  sagittal reconstructions were obtained.  Radiation dose reduction techniques included automated exposure control or exposure modulation based on body size. Count of known CT and cardiac nuc med studies  performed in previous 12 months: 0.     COMPARISON:   Two-view chest 12/4/2019    FINDINGS:  Small left and moderate sized right pleural effusion with fluid layering to the depth of close to 5 cm on the right and about 2.5 cm on the left. Compressive atelectasis in both lung bases. Some associated pneumonitis not excluded. Mild background  centrilobular emphysema.    Cardiomegaly without pericardial effusion. Coronary artery calcifications. Aorta and pulmonary vessels unremarkable. Calcified left hilar nodes. Small amount of noncalcified mediastinal lymphadenopathy. Upper abdomen unremarkable. Osseous structures and  soft tissues are unremarkable.      Impression:       Bilateral pleural effusions right greater than left with probable compressive atelectasis in both lung bases. No convincing evidence of consolidative pneumonia.    Background emphysema.    Signer Name: KRISHNA Boss MD   Signed: 12/4/2019 9:59 PM   Workstation Name: [facility]    [facility]    XR Chest 2 View [[id number]] Collected:  12/04/19 1836     Updated:  12/04/19 1838    Narrative:       EXAMINATION: XR CHEST 2 VW-      CLINICAL INDICATION:     Chest Pain Triage Protocol     TECHNIQUE:  XR CHEST 2 VW-      COMPARISON: 11/03/2019      FINDINGS:   Improvement in CHF. Persistent basilar airspace disease with small  pleural effusions. Cardiomegaly. No significant interstitial edema.  Pulmonary vascularity appears within normal limits.       Impression:       Improvement in CHF but persistent changes of edema are noted  with small pleural effusions.     This report was finalized on 12/4/2019 6:36 PM by Dr. Satish Quinn MD.            Thank you very much for asking us to be involved in this patient's care.  We will  follow along with you.    Jigna Smiht, APRN   12/05/19  9:15 AM    Patient seen and examined, face-to-face.  Patient follows up with Dr. Lyons, primary cardiologist, he has seen him in May 2019 for A. fib with RVR and was started him on metoprolol, his heart rate has been persistently in the 100s to 120s.  He was also on digoxin and Lasix at home, he had recent labs and due to elevated creatinine his digoxin and Lasix was held and he presented this time with mild acute systolic and diastolic CHF.  He appears clinically euvolemic now.  His heart rate is still in 100s to 120s.  Increase her Lopressor to 150 twice daily and watch for his heart rate response.  Since his EF is decreased to 35 to 40% he will need a better rate control, preferably rhythm control back into sinus rhythm and initiation of antiarrhythmic medication, due to his advanced age, CKD, cardiomyopathy have limited options here, he is amiodarone if his LFTs normalizes.  Closely monitor his renal functions, liver function and electrolytes.

## 2019-12-05 NOTE — PHARMACY PATIENT ASSISTANCE
Pharmacy checked on pricing for eliquis.  He has a $0 copay.  No other issues at this time.    Thank You;  Sandra Villarreal [facility]  12/05/19  3:12 PM

## 2019-12-05 NOTE — PROGRESS NOTES
Discharge Planning Assessment   [place]     Patient Name: Hugo Colon  MRN: [id number]  Today's Date: 12/5/2019    Admit Date: 12/4/2019    Discharge Needs Assessment     Row Name 12/05/19 1156       Living Environment    Lives With  alone    Current Living Arrangements  home/apartment/condo    Primary Care Provided by  self;other (see comments) [facility]    Provides Primary Care For  no one    Family Caregiver if Needed  child(sam), adult    Quality of Family Relationships  involved    Able to Return to Prior Arrangements  yes       Resource/Environmental Concerns    Transportation Concerns  car, none       Transition Planning    Patient/Family Anticipates Transition to  home with family    Transportation Anticipated  family or friend will provide       Discharge Needs Assessment    Equipment Currently Used at Home  cane, straight    Equipment Needed After Discharge  none        Discharge Plan     Row Name 12/05/19 6832       Plan    Plan  SS spoke with pt on this day. Pt lives at home alone. Pt receives [facility] services. [facility] to be contacted at discharge. Pt has a cane. Pt does not have a POA or a living will. Pt's PCP is Sincere Jeffries, and he uses [facility]. Pt plans to return home at discharge, with transportation provided  by family. SS will follow and assist as needed.     Patient/Family in Agreement with Plan  yes            Demographic Summary     Row Name 12/05/19 1156       General Information    Admission Type  inpatient    Referral Source  nursing    Reason for Consult  discharge planning    Preferred Language  English     Used During This Interaction  no          JH Kendall

## 2019-12-06 NOTE — PROGRESS NOTES
[facility] HOSPITALIST PROGRESS NOTE     Patient Identification:  Name:  Hugo Colon  Age:  89 y.o.  Sex:  male  :  1930  MRN:  [id number]  Visit Number:  [id number]  Primary Care Provider:  Sincere Jeffries MD    Length of stay:  2    Subjective: Patient seen and examined assisted by Deann Loera RN.  Patient currently in bed he is no longer orthopneic, he claims he is ambulating mildly dyspneic but felt much better.  Diuresing fairly well, heart rate is around upper 80s to 90s at rest it goes as high as 170 with ambulation but goes down below 100 after ambulation.  Blood pressures tolerating well with 150 mg of Lopressor twice a day.  2D echo last November 3 reveals ejection fraction of 36 to 40% with moderate ventricular global hypokinesis there is also moderate aortic stenosis and pulmonary pretension moderate.    Chief Complaint: Dyspnea on exertion  ----------------------------------------------------------------------------------------------------------------------  Current Hospital Meds:    apixaban 2.5 mg Oral Q12H   aspirin 325 mg Oral Once   atorvastatin 10 mg Oral Nightly   I-kenya 1 tablet Oral Daily   metoprolol tartrate 150 mg Oral BID   multivitamin 1 tablet Oral Daily   pantoprazole 40 mg Oral Q AM   sodium chloride 10 mL Intravenous Q12H        ----------------------------------------------------------------------------------------------------------------------  Vital Signs:  Temp:  [97.3 °F (36.3 °C)-97.7 °F (36.5 °C)] 97.3 °F (36.3 °C)  Heart Rate:  [] 106  Resp:  [18-20] 18  BP: (107-127)/(65-89) 107/65       Tele: Atrial fibrillation rate 109 bpm      19  2346 19  0506 19  0500   Weight: 67.4 kg (148 lb 9.6 oz) 67.4 kg (148 lb 9.6 oz) 66.3 kg (146 lb 3.2 oz)     Body mass index is 22.9 kg/m².    Intake/Output Summary (Last 24 hours) at 2019 1436  Last data filed at 2019 0346  Gross per 24 hour   Intake 120 ml   Output 250 ml    Net -130 ml     Diet Regular; Cardiac  ----------------------------------------------------------------------------------------------------------------------  Physical exam:  General: Comfortable,awake, alert, oriented to self, place, and time, well-developed, chronically ill-appearing,  No respiratory distress.    Skin:  Skin is warm and dry. No rash noted. No pallor.    HENT:  Head:  Normocephalic and atraumatic.  Mouth:  Moist mucous membranes.    Eyes:  Conjunctivae and EOM are normal.  Pupils are equal, round, and reactive to light.  No scleral icterus.    Neck:  Neck supple.  No JVD present.  Mild hepatojugular reflux  Pulmonary/Chest:  No respiratory distress, no wheezes, no crackles, with normal breath sounds and good air movement.  Cardiovascular: Irregular irregular, slightly tachycardic, distant heart sounds, I could not hear murmur or gallop.   rate, regular rhythm and normal heart sounds with no murmur on my exam  Abdominal:  Soft.  Bowel sounds are normal.  No distension and no tenderness.   Extremities:  No edema, no tenderness, and no deformity.  No red or swollen joints anywhere.  Strong pulses in all 4 extremities with no clubbing, no cyanosis, no edema.  Neurological:  Motor strength equal no obvious deficit, sensory grossly intact.   No cranial nerve deficit.  No tongue deviation.  No facial droop.  No slurred speech.    Genitourinary: No Medrano catheter    ----------------------------------------------------------------------------------------------------------------------  ----------------------------------------------------------------------------------------------------------------------  Results from last 7 days   Lab Units 12/04/19 2044 12/04/19 1845   TROPONIN T ng/mL <0.010 <0.010     Results from last 7 days   Lab Units 12/05/19  0411 12/04/19 2107 12/04/19 2044 12/04/19 1845   CRP mg/dL 0.82*  --   --  0.92*   LACTATE mmol/L 1.7  --  2.0  --    WBC 10*3/mm3  --   --   --  10.32    HEMOGLOBIN g/dL  --   --   --  16.7   HEMATOCRIT %  --   --   --  53.5*   MCV fL  --   --   --  98.3*   MCHC g/dL  --   --   --  31.2*   PLATELETS 10*3/mm3  --   --   --  223   INR   --  1.89*  --   --      Results from last 7 days   Lab Units 12/04/19  2053   PH, ARTERIAL pH units 7.426   PO2 ART mm Hg 110.0*   PCO2, ARTERIAL mm Hg 26.3*   HCO3 ART mmol/L 17.3*     Results from last 7 days   Lab Units 12/05/19  0411 12/04/19  1845   SODIUM mmol/L 141 140   POTASSIUM mmol/L 4.7 5.3*   MAGNESIUM mg/dL  --  2.6*   CHLORIDE mmol/L 107 104   CO2 mmol/L 18.0* 17.2*   BUN mg/dL 48* 46*   CREATININE mg/dL 1.67* 1.69*   EGFR IF NONAFRICN AM mL/min/1.73 39* 38*   CALCIUM mg/dL 8.8 10.0   GLUCOSE mg/dL 84 120*   ALBUMIN g/dL 3.36* 4.46   BILIRUBIN mg/dL 1.1 1.3*   ALK PHOS U/L 85 117   AST (SGOT) U/L 73* 110*   ALT (SGPT) U/L 72* 81*   Estimated Creatinine Clearance: 28.1 mL/min (A) (by C-G formula based on SCr of 1.67 mg/dL (H)).    No results found for: AMMONIA  Results from last 7 days   Lab Units 12/05/19 0411   CHOLESTEROL mg/dL 73   TRIGLYCERIDES mg/dL 50   HDL CHOL mg/dL 31*   LDL CHOL mg/dL 32     Blood Culture   Date Value Ref Range Status   12/04/2019 No growth at 24 hours  Preliminary   12/04/2019 No growth at 24 hours  Preliminary                I have personally looked at the labs and they are summarized above.  ----------------------------------------------------------------------------------------------------------------------  Imaging Results (Last 24 Hours)     ** No results found for the last 24 hours. **        ----------------------------------------------------------------------------------------------------------------------  Assessment and Plan:  -Acute on chronic systolic and diastolic heart failure now compensated with ejection fraction of 36 to 40%  -Moderate pulmonary hypertension  -Moderate aortic stenosis  -CKD stage III  -Chronic anticoagulation for stroke prevention  -Essential  hypertension    Monitor electrolytes and renal function, Lasix as needed, cardiology contemplating of starting the patient on digoxin if renal function is stable, patient is scheduled to follow-up with EP cardiologist.      Johanne Fry MD  12/06/19  2:36 PM

## 2019-12-06 NOTE — PROGRESS NOTES
Discharge Planning Assessment   [place]     Patient Name: Hugo Colon  MRN: [id number]  Today's Date: 12/6/2019    Admit Date: 12/4/2019        Discharge Plan     Row Name 12/06/19 1341       Plan    Plan  SS spoke with pt on this day. Pt lives at home alone. Pt receives [facility] services. [facility] to be contacted at discharge. Pt has a cane.  Pt plans to return home at discharge, with transportation provided  by family. SS will follow and assist as needed.     Patient/Family in Agreement with Plan  yes           JH Kendall

## 2019-12-06 NOTE — PROGRESS NOTES
"   LOS: 2 days     Name: Hugo Colon  Age/Sex: 89 y.o. male  :  1930        PCP: Sincere Jeffries MD    Principal Problem:    Atrial fibrillation, persistent with RVR   Active Problems:    Advanced age    Other specified hypothyroidism    Chronic anticoagulation with low-dose Eliquis    Chronic renal failure, stage 3 (moderate) (CMS/[facility])    Nonrheumatic aortic valve stenosis    SIRS (systemic inflammatory response syndrome) (CMS/[facility])      Admission Information: Hugo Colon is a 89 y.o. male with a past medical history significant for persistent atrial fibrillation, chronic anticoagulation on Eliquis, systolic and diastolic heart failure with LVEF of 36 to 40%, moderate aortic valve stenosis, essential hypertension, hyperlipidemia, stage II chronic kidney disease, and hypothyroidism.  He presented to the ED on 2019 with report of increasing worsening shortness of breath.  He was admitted with atrial fibrillation with RVR.  Cardiology was consulted for same.    Chief Complaint: follow up atrial fibrillation with RVR    Interval history: Pt seen and examined.  Mr Colon denies chest pain and palpitations.  He has been up in his room. Ambulated to the bathroom.  He does states that he does get \"a little\" short of breath.      Subjective     Review of Systems   Constitution: Negative for weakness and malaise/fatigue.   Cardiovascular: Negative for chest pain, dyspnea on exertion, irregular heartbeat, leg swelling, near-syncope, orthopnea, palpitations, paroxysmal nocturnal dyspnea and syncope.   Respiratory: Positive for shortness of breath.    Hematologic/Lymphatic: Does not bruise/bleed easily.   Neurological: Negative for dizziness and light-headedness.       Vital Signs  Vital Signs (last 72 hrs)        0700  -   0659  0700  -   0659  07  -   0659  07  -   0854   Most Recent    Temp (°F)   97.4 -  97.8    97.4 -  98.1       97.4 (36.3)    Heart Rate   " 86 -  (!)126    91 -  120       99    Resp   18 -  20    18 -  20       18    BP   100/86 -  129/80    100/69 -  129/75       108/73    SpO2 (%)   95 -  100    92 -  97       93        Temp:  [97.4 °F (36.3 °C)-97.8 °F (36.6 °C)] 97.4 °F (36.3 °C)  Heart Rate:  [] 99  Resp:  [18-20] 18  BP: (100-127)/(69-89) 108/73  Body mass index is 22.9 kg/m².      Intake/Output Summary (Last 24 hours) at 12/6/2019 0854  Last data filed at 12/6/2019 0346  Gross per 24 hour   Intake 360 ml   Output 650 ml   Net -290 ml       Physical Exam   Constitutional: He is oriented to person, place, and time. He appears well-developed and well-nourished.   Neck: No JVD present. Carotid bruit is not present.   Cardiovascular: Intact distal pulses. An irregularly irregular rhythm present. Tachycardia present.   No lower extremity edema   Pulmonary/Chest: Effort normal and breath sounds normal. No respiratory distress. He has no wheezes. He has no rales.   Abdominal: Soft. Bowel sounds are normal. He exhibits no distension. There is no tenderness.   Neurological: He is alert and oriented to person, place, and time.   Psychiatric: He has a normal mood and affect. Cognition and memory are normal.   Vitals reviewed.      Telemetry: A fib 100-130s       Results Review:     Results from last 7 days   Lab Units 12/04/19  1845   WBC 10*3/mm3 10.32   HEMOGLOBIN g/dL 16.7   PLATELETS 10*3/mm3 223     Results from last 7 days   Lab Units 12/05/19  0411 12/04/19  1845   SODIUM mmol/L 141 140   POTASSIUM mmol/L 4.7 5.3*   CHLORIDE mmol/L 107 104   CO2 mmol/L 18.0* 17.2*   BUN mg/dL 48* 46*   CREATININE mg/dL 1.67* 1.69*   CALCIUM mg/dL 8.8 10.0   GLUCOSE mg/dL 84 120*     Results from last 7 days   Lab Units 12/04/19  2044 12/04/19  1845   TROPONIN T ng/mL <0.010 <0.010       Results from last 7 days   Lab Units 12/04/19  2107   INR  1.89*       I reviewed the patient's new clinical results.  I reviewed the patient's new imaging results and agree  with the interpretation.  I personally viewed and interpreted the patient's EKG/Telemetry data      Medication Review:     apixaban 2.5 mg Oral Q12H   aspirin 325 mg Oral Once   atorvastatin 10 mg Oral Nightly   I-kenya 1 tablet Oral Daily   metoprolol tartrate 150 mg Oral BID   multivitamin 1 tablet Oral Daily   pantoprazole 40 mg Oral Q AM   sodium chloride 10 mL Intravenous Q12H          Assessment:  1. Persistent atrial fibrillation with RVR  2. Chronic systolic and diastolic heart failure  3. Moderate aortic valve stenosis  4. Chronic kidney disease, stage 3      Recommendations:  1. With regard to atrial fibrillation, the patient's heart rate is showing some improvement in rate with the increased dose of metoprolol.  He is not a candidate for digoxin secondary to decreased kidney function, he cannot be placed on amiodarone secondary to elevated LFTs, and cardizem is not a good choice for him either due to his decreased LVEF.  Will continued to monitor heart rate and blood pressure.  Since he has been difficult to control, he will benefit from seeing EP as scheduled in January.    2. Pt is chronically anticoagulated on Eliquis for a CHADsVASc of at least 4 for hypertension, heartfailure and age.  3. With regard to patient's heart failure, he appears compensated.      I discussed the patients findings and my recommendations with patient and family    JULI Groves  12/06/19  8:54 AM      Patient seen and examined.  His heart rate still continues to be in the lower 100s on ambulation goes up to 130.  He is on Lopressor 150 mg twice daily.  We will check his kidney and renal function today.  If his kidney functions are stable and if his electrolytes were stable we will start him on digoxin 125 mg daily to get better heart rate control.  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  Patient follows up with Dr. Lyons,  primary cardiologist and he also has an appointment with Dr. Antony, EP physician at the [facility], I did I explained to the patient regarding rhythm control because of his cardiomyopathy.  He has not had ischemic evaluation but he denies any chest pain or other signs of CAD right now.  He will need at least a stress test at some point.  Also has a moderate aortic stenosis, he needs evaluation for possible low-flow low gradient aortic stenosis due to decreased LV systolic function.

## 2019-12-06 NOTE — PLAN OF CARE
Problem: Fall Risk (Adult)  Goal: Identify Related Risk Factors and Signs and Symptoms  Outcome: Ongoing (interventions implemented as appropriate)    Goal: Absence of Fall  Outcome: Ongoing (interventions implemented as appropriate)      Problem: Patient Care Overview  Goal: Plan of Care Review  Outcome: Ongoing (interventions implemented as appropriate)    Goal: Discharge Needs Assessment  Outcome: Ongoing (interventions implemented as appropriate)      Problem: Arrhythmia/Dysrhythmia (Symptomatic) (Adult)  Goal: Signs and Symptoms of Listed Potential Problems Will be Absent, Minimized or Managed (Arrhythmia/Dysrhythmia)  Outcome: Ongoing (interventions implemented as appropriate)

## 2019-12-07 PROBLEM — R65.10 SIRS (SYSTEMIC INFLAMMATORY RESPONSE SYNDROME) (HCC): Status: RESOLVED | Noted: 2019-01-01 | Resolved: 2019-01-01

## 2019-12-07 NOTE — PLAN OF CARE
Problem: Patient Care Overview  Goal: Plan of Care Review  Outcome: Ongoing (interventions implemented as appropriate)  Flowsheets (Taken 12/7/2019 1012)  Progress: improving  Plan of Care Reviewed With: patient  Outcome Summary: Pt has no complaints this morning, states he feels better

## 2019-12-07 NOTE — DISCHARGE SUMMARY
[facility] HOSPITALIST MEDICINE DISCHARGE SUMMARY    Patient Identification:  Name:  Hugo Colon  Age:  89 y.o.  Sex:  male  :  1930  MRN:  [id number]  Visit Number:  [id number]    Date of Admission: 2019  Date of Discharge:  2019   DISCHARGE DISPOSITION   Stable  PCP: Sincere Jeffries MD    DISCHARGE DIAGNOSIS : Persistent atrial fibrillation with rapid vascular response, SIRS, aortic valve stenosis moderate, CKD stage III, acquired hypothyroidism, acute on chronic systolic and diastolic heart failure, chronic anticoagulation for stroke prophylaxis, moderate pulmonary pretension.  Dyslipidemia.  Mild transaminase elevation improved.    HOSPITAL COURSE  Patient is a 89 y.o. male presented to [facility] complaining of progressive shortness of breath and dyspnea on exertion, on arrival to emergency room he was noted to have atrial fibrillation rapid ventricular response, he received IV bolus of Cardizem 5 mg with improvement he was started on Lopressor 150 mg twice a day, also received Lasix for CHF based on exam, x-ray and history.  After above treatment his heart rate has improved his dyspnea on exertion and CHF improved.  Cardiac enzymes are negative.  Cardiology service was consulted and Dr. Fowler was able to participate with the care of the patient.  Day 2 his heart rate is still slightly but is better controlled.  We will closely monitor the patient including her renal function and electrolytes cardiology was contemplating of adding digoxin but because of his renal failure it was held.  On his third day his heart rate has been on the upper 80s, blood pressures tolerating well with 150 mg of Lopressor twice a day, his heart rate does go up to maximum 117 bpm on ambulation but decreases immediately to high 80s on his resting.  Cardiology cleared the patient for discharge, Dr. Fowler would like to follow-up patient in 2 weeks time, Lasix 20 mg as  needed was also prescribed per cardiologist recommendation for weight gain of 3 pounds or more.  Patient has an appointment with EP specialty in [facility], he will also follow-up with Dr. Aviles, Dr. Fowler recommended ischemic work-up, he plans to follow-up the patient in 2 weeks time.        VITAL SIGNS:      12/05/19  0506 12/06/19  0500 12/07/19  0500   Weight: 67.4 kg (148 lb 9.6 oz) 66.3 kg (146 lb 3.2 oz) 67 kg (147 lb 12.8 oz)     Body mass index is 23.15 kg/m².  Vitals:    12/07/19 1027   BP: 105/82   Pulse: 103   Resp: 18   Temp: 97.5 °F (36.4 °C)   SpO2: 93%     PHYSICAL EXAM:  General: Comfortable,awake, alert, oriented to self, place, and time, well-developed, chronically ill-appearing,  No respiratory distress.  He just came back from ambulation.  Skin:  Skin is warm and dry. No rash noted. No pallor.    HENT:  Head:  Normocephalic and atraumatic.  Mouth:  Moist mucous membranes.    Eyes:  Conjunctivae and EOM are normal.  Pupils are equal, round, and reactive to light.  No scleral icterus.    Neck:  Neck supple.  No JVD present. No hepatojugular reflux  Pulmonary/Chest:  No respiratory distress, no wheezes, no crackles, with normal breath sounds and good air movement.  Cardiovascular: Irregular irregular, not tachycardic, distant heart sounds, I could not hear murmur or gallop.   rate, regular rhythm and normal heart sounds with no murmur on my exam  Abdominal:  Soft.  Bowel sounds are normal.  No distension and no tenderness.   Extremities:  No edema, no tenderness, and no deformity.  No red or swollen joints anywhere.  Strong pulses in all 4 extremities with no clubbing, no cyanosis, no edema.  Neurological:  Motor strength equal no obvious deficit, sensory grossly intact.   No cranial nerve deficit.  No tongue deviation.  No facial droop.  No slurred speech.    Genitourinary: No Medrano catheter       ----------    DISCHARGE MEDICATIONS:     Discharge Medications      New Medications       Instructions Start Date   aspirin 81 MG EC tablet   81 mg, Oral, Daily      furosemide 20 MG tablet  Commonly known as:  LASIX   Take 1 tablet daily as needed for weight gain 3 lbs or more      multivitamin tablet tablet   1 tablet, Oral, Daily   Start Date:  December 8, 2019     nitroglycerin 0.4 MG SL tablet  Commonly known as:  NITROSTAT   0.4 mg, Sublingual, Every 5 Minutes PRN      pantoprazole 40 MG EC tablet  Commonly known as:  PROTONIX   40 mg, Oral, Daily      polyethylene glycol pack packet  Commonly known as:  MIRALAX   17 g, Oral, Daily         Continue These Medications      Instructions Start Date   apixaban 2.5 MG tablet tablet  Commonly known as:  ELIQUIS   2.5 mg, Oral, Every 12 Hours Scheduled      atorvastatin 10 MG tablet  Commonly known as:  LIPITOR   10 mg, Oral, Nightly      metoprolol tartrate 100 MG tablet  Commonly known as:  LOPRESSOR   150 mg, Oral, 2 Times Daily      MULTIVITAMIN ADULT PO   1 tablet, Oral, Daily             Diet Instructions     Resume diet as tolerated.             Your Scheduled Appointments    Dec 20, 2019  9:15 AM EST  Follow Up with Samantha Iniguez MD  [facility] CARDIOLOGY (--) [address] [place] [address] [phone]   Arrive 15 minutes prior to appointment.     Fredrick 10, 2020 11:00 AM EST  Follow Up with Brandt Arias MD  [facility] CARDIOLOGY (--) [address] [place] [address] [place] [address] [phone]   Arrive 15 minutes prior to appointment.          Activity Instructions     Resume activity as tolerated.              Additional Instructions for the Follow-ups that You Need to Schedule     Discharge Follow-up with PCP   As directed       Currently Documented PCP:    Sincere Jeffries MD    PCP Phone Number:    [phone]     Follow Up Details:  Sincere Read         Discharge Follow-up with Specified Provider: Dr. Gilbert; 2 Weeks   As directed      To:    Ofelia    Follow Up:  2 Weeks           Follow-up Information     Sincere Jeffries MD Follow up in 1 week(s).    Specialty:  Family Medicine  Why:  PATIENT WILL BE NOTIFIED ABOUT APPT ON MONDAY 12/9  [facility] information:  [address] [facility] [address] [phone]             Ovidio Paris MD Follow up in 2 week(s).    Specialties:  Interventional Cardiology, Cardiology  Why:  PATIENT WILL BE NOTIFIED ABOUT APPT ON MONDAY 12/9  Contact information:  [address]  [address] [address]  [phone]                   Johanne Fry MD  12/07/19  2:03 PM    Please note that this discharge summary required more than 30 minutes to complete.

## 2019-12-08 NOTE — OUTREACH NOTE
Prep Survey      Responses   Facility patient discharged from?  [place]   Is patient eligible?  Yes   Discharge diagnosis  Atrial fibrillation, persistent with RVR , SIRS, CHF   Does the patient have one of the following disease processes/diagnoses(primary or secondary)?  CHF   Does the patient have Home health ordered?  Yes   What is the Home health agency?   [facility].     Is there a DME ordered?  No   Prep survey completed?  Yes          Ijeoma Barba RN

## 2019-12-08 NOTE — PAYOR COMM NOTE
"[facility]  [place]  PHONE  [phone]  FAX  [phone]  NPI:  [id number]    PATIENT D/C 12/7/19      Gillian Howell (89 y.o. Male)     Date of Birth Social Security Number Address Home Phone MRN    02/28/1930  [address] [place] [id number] [phone] [id number]    [employer] Marital Status          Unknown        Admission Date Admission Type Admitting Provider Attending Provider Department, Room/Bed    12/4/19 Emergency Marc Jaramillo MD  [facility], 3315/1S    Discharge Date Discharge Disposition Discharge Destination        12/7/2019 Home-Health Care Svc              Attending Provider:  (none)   Allergies:  Penicillins, Levofloxacin    Isolation:  None   Infection:  None   Code Status:  Prior    Ht:  170.2 cm (67\")   Wt:  67 kg (147 lb 12.8 oz)    Admission Cmt:  None   Principal Problem:  Atrial fibrillation, persistent with RVR  [I48.19]                 Active Insurance as of 12/4/2019     Primary Coverage     Payor Plan Insurance Group Employer/Plan Group    [facility] MEDICARE REPLACEMENT [facility] MEDICARE REPLACEMENT [id number]     Payor Plan Address Payor Plan Phone Number Payor Plan Fax Number Effective Dates    [address] [address]   1/1/2016 - None Entered    [facility] [id number]       Subscriber Name Subscriber Birth Date Member ID       GILLIAN HOWELL 2/28/1930 [id number]                 Emergency Contacts      (Rel.) Home Phone Work Phone Mobile Phone    Saleem Mcdonald (Son) [phone] -- --    LAMONT MCDONALD (Relative) [phone] -- [phone]              "

## 2019-12-08 NOTE — PROGRESS NOTES
Discharge Planning Assessment   [facility]     Patient Name: Hugo Colon  MRN: [id number]  Today's Date: 12/8/2019    Admit Date: 12/4/2019    Discharge Needs Assessment    No documentation.       Discharge Plan     Row Name 12/08/19 1458       Plan    Final Discharge Disposition Code  06 - home with home health care    Final Note  Patient discharged home with [facility].  RN to call report at discharge.          Destination      Coordination has not been started for this encounter.      Durable Medical Equipment      Coordination has not been started for this encounter.      Dialysis/Infusion      Coordination has not been started for this encounter.      Home Medical Care      Service Provider Request Status Selected Services Address Phone Number Fax Number    [facility] No Request Sent N/A [address] [place] [id number] [phone]      Therapy      Coordination has not been started for this encounter.      Community Resources      Coordination has not been started for this encounter.        Expected Discharge Date and Time     Expected Discharge Date Expected Discharge Time    Dec 7, 2019         Demographic Summary    No documentation.       Functional Status    No documentation.       Psychosocial    No documentation.       Abuse/Neglect    No documentation.       Legal    No documentation.       Substance Abuse    No documentation.       Patient Forms    No documentation.           Cassandra Car RN

## 2019-12-10 NOTE — OUTREACH NOTE
CHF Week 1 Survey      Responses   Facility patient discharged from?  [facility]   Does the patient have one of the following disease processes/diagnoses(primary or secondary)?  CHF   Is there a successful TCM telephone encounter documented?  No   CHF Week 1 attempt successful?  Yes   Call start time  1656   Call end time  1701   Discharge diagnosis  Atrial fibrillation, persistent with RVR , SIRS, CHF   Is patient permission given to speak with other caregiver?  Yes   List who call center can speak with  courtney Monge   Meds reviewed with patient/caregiver?  Yes   Is the patient having any side effects they believe may be caused by any medication additions or changes?  No   Does the patient have all medications ordered at discharge?  Yes   Is the patient taking all medications as directed (includes completed medication regime)?  Yes   Medication comments  family uses pill organizer for pt.    Does the patient have a primary care provider?   Yes   Does the patient have an appointment with their PCP within 7 days of discharge?  Yes   Has the patient kept scheduled appointments due by today?  N/A   What is the Home health agency?   [facility]     Has home health visited the patient within 72 hours of discharge?  No   Psychosocial issues?  No   Comments  Pt c/o SOA is better but still present.    Did the patient receive a copy of their discharge instructions?  Yes   Nursing interventions  Reviewed instructions with patient   What is the patient's perception of their health status since discharge?  Improving   Is the patient weighing daily?  No   Does the patient have scales?  Yes   Daily weight interventions  Education provided on importance of daily weight   Is the patient able to teach back Heart Failure diet management?  Yes   Is the patient able to teach back signs and symptoms of worsening condition? (i.e. weight gain, shortness of air, etc.)  Yes    CHF Week 1 call completed?  Yes          Lydia Bill RN

## 2019-12-18 NOTE — OUTREACH NOTE
CHF Week 2 Survey      Responses   Facility patient discharged from?  [facility]   Does the patient have one of the following disease processes/diagnoses(primary or secondary)?  CHF   Week 2 attempt successful?  Yes   Call start time  1559   Call end time  1600   Discharge diagnosis  Atrial fibrillation, persistent with RVR , SIRS, CHF   Meds reviewed with patient/caregiver?  Yes   Is the patient having any side effects they believe may be caused by any medication additions or changes?  No   Does the patient have all medications ordered at discharge?  Yes   Is the patient taking all medications as directed (includes completed medication regime)?  Yes   Does the patient have a primary care provider?   Yes   Does the patient have an appointment with their PCP within 7 days of discharge?  Yes   Has the patient kept scheduled appointments due by today?  Yes   What is the Home health agency?   [facility].     Has home health visited the patient within 72 hours of discharge?  Yes   Psychosocial issues?  No   Did the patient receive a copy of their discharge instructions?  Yes   Nursing interventions  Reviewed instructions with patient   What is the patient's perception of their health status since discharge?  Same   Nursing interventions  Nurse provided patient education   Daily weight interventions  Education provided on importance of daily weight   CHF Week 2 call completed?  Yes          Roslyn Price, RN

## 2019-12-19 NOTE — PROGRESS NOTES
Subjective   Hugo Colon is a 89 y.o. male.     History of Present Illness follow-up from recent hospitalization regarding cardiac arrhythmia rapid heart rate dyspnea.  Was evaluated emergently and maintained rate control with medication intervention.  Has generally been back to prior baseline.    The following portions of the patient's history were reviewed and updated as appropriate: allergies, past family history, past medical history, past social history, past surgical history and problem list.    Review of Systems  See history of Present Illness     Objective     Physical Exam   Constitutional: He is oriented to person, place, and time. He appears well-developed and well-nourished.   HENT:   Head: Normocephalic.   Mouth/Throat: Oropharynx is clear and moist.   Eyes: Conjunctivae and EOM are normal.   Neck: Normal range of motion. Neck supple. No tracheal deviation present. No thyromegaly present.   Cardiovascular: Normal rate and normal heart sounds.   No murmur heard.  Irregularly  irregular rhythm   Pulmonary/Chest: Effort normal and breath sounds normal.   Abdominal: Soft. There is no tenderness.   Musculoskeletal: He exhibits no edema.   Lymphadenopathy:     He has no cervical adenopathy.   Neurological: He is alert and oriented to person, place, and time.   Skin: Skin is warm and dry.   Psychiatric: He has a normal mood and affect.   Vitals reviewed.      PHQ-9 Total Score:      Patient's Body mass index is 24.6 kg/m². BMI is within normal parameters. No follow-up required..   (Normal BMI:  18.5-24.9, OW 25-29.9, Obesity 30 or greater)      Assessment/Plan     Hugo was seen today for atrial fibrillation.    Diagnoses and all orders for this visit:    Persistent atrial fibrillation  -     Basic Metabolic Panel    Chronic renal failure, stage 3 (moderate) (CMS/HCC)  -     Basic Metabolic Panel    Nonrheumatic aortic valve stenosis    In regards to medication reconciliation I encouraged to use the Lasix only  if there is weight gain.  Metoprolol should be 150 mg twice daily.  Will notify of lab results regarding renal function.  Stay safely active.  Maintain reasonably balanced diet.  I believe you should be in contact with family or friends on a daily basis.  It is likely that you will be needing more assistance in the near term.  Keep follow-up as scheduled with cardiology.  Recheck here in 2 weeks or as needed.                     This document has been electronically signed by Sincere Jeffries MD   December 19, 2019 2:04 PM

## 2019-12-23 NOTE — PROGRESS NOTES
subjective     Chief Complaint   Patient presents with   • Atrial Fibrillation     History of Present Illness    Patient is 89 years old white male who is here for follow-up.  He is taking his medications differently.  He was supposed to be taking metoprolol tartrate 150 twice daily but he is taking 150 in the morning and 50 in the evening.  In the hospital apparently he was started on Lasix however patient has significant renal failure and Lasix had to be discontinued.    He denies any chest pain or shortness of breath but still complains of palpitations.    He is anticoagulated with Eliquis 2.5 twice daily.  Heart rate is fast.  Past Surgical History:   Procedure Laterality Date   • EXTERNAL EAR SURGERY      cancer   • HEMORRHOIDECTOMY     • HERNIA REPAIR       Family History   Problem Relation Age of Onset   • Hypertension Sister    • Hyperlipidemia Sister    • Hyperlipidemia Brother    • Hypertension Brother      Past Medical History:   Diagnosis Date   • Atrial fibrillation (CMS/HCC)    • Chronic anticoagulation with low-dose Eliquis 2019   • Chronic renal failure, stage 3 (moderate) (CMS/HCC) 2019   • Diverticulosis of large intestine 2016   • Dyssomnia 2016   • Hyperlipidemia    • Hypertension    • Nonrheumatic aortic valve stenosis 2019   • Systolic CHF (CMS/HCC)      Patient Active Problem List   Diagnosis   • Dyssomnia   • Persistent atrial fibrillation   • Advanced age   • Other specified hypothyroidism   • Chronic anticoagulation with low-dose Eliquis   • Chronic renal failure, stage 3 (moderate) (CMS/HCC)   • Nonrheumatic aortic valve stenosis   • Atrial fibrillation (CMS/HCC)   • Atrial fibrillation, persistent with RVR        Social History     Tobacco Use   • Smoking status: Former Smoker     Packs/day: 1.00     Years: 17.00     Pack years: 17.00     Types: Cigarettes     Start date:      Last attempt to quit: 1963     Years since quittin.0   • Smokeless tobacco: Never  Used   Substance Use Topics   • Alcohol use: No   • Drug use: No       Allergies   Allergen Reactions   • Penicillins Other (See Comments)     Made arms blue   • Levofloxacin Unknown - Low Severity       Current Outpatient Medications on File Prior to Visit   Medication Sig   • apixaban (ELIQUIS) 2.5 MG tablet tablet Take 1 tablet by mouth Every 12 (Twelve) Hours.   • aspirin 81 MG EC tablet Take 1 tablet by mouth Daily.   • atorvastatin (LIPITOR) 10 MG tablet Take 1 tablet by mouth Every Night.   • furosemide (LASIX) 20 MG tablet Take 1 tablet daily as needed for weight gain 3 lbs or more   • Multiple Vitamins-Minerals (I-NICO) tablet tablet Take 1 tablet by mouth Daily.   • Multiple Vitamins-Minerals (MULTIVITAMIN ADULT PO) Take 1 tablet by mouth Daily.   • multivitamin (DAILY NICO) tablet tablet Take 1 tablet by mouth Daily.   • nitroglycerin (NITROSTAT) 0.4 MG SL tablet Place 1 tablet under the tongue Every 5 (Five) Minutes As Needed for Chest Pain (Only if SBP Greater Than 100).   • pantoprazole (PROTONIX) 40 MG EC tablet Take 1 tablet by mouth Daily.   • polyethylene glycol (MIRALAX) pack packet Take 17 g by mouth Daily.   • [DISCONTINUED] metoprolol tartrate (LOPRESSOR) 100 MG tablet Take 1.5 tablets by mouth 2 (Two) Times a Day.     No current facility-administered medications on file prior to visit.          The following portions of the patient's history were reviewed and updated as appropriate: allergies, current medications, past family history, past medical history, past social history, past surgical history and problem list.    Review of Systems   Constitution: Negative.   HENT: Negative.  Negative for congestion.    Eyes: Negative.    Cardiovascular: Positive for dyspnea on exertion and palpitations. Negative for chest pain, cyanosis, irregular heartbeat, leg swelling, near-syncope, orthopnea, paroxysmal nocturnal dyspnea and syncope.   Respiratory: Negative.  Negative for shortness of breath.   "  Hematologic/Lymphatic: Negative.    Musculoskeletal: Negative.    Gastrointestinal: Negative.    Neurological: Negative.  Negative for headaches.          Objective:     /78   Pulse 110   Resp 16   Ht 170.2 cm (67.01\")   Wt 70.3 kg (155 lb)   SpO2 (!) 89%   BMI 24.27 kg/m²   Physical Exam   Constitutional: He appears well-developed and well-nourished. No distress.   HENT:   Head: Normocephalic and atraumatic.   Mouth/Throat: Oropharynx is clear and moist. No oropharyngeal exudate.   Eyes: Pupils are equal, round, and reactive to light. Conjunctivae and EOM are normal. No scleral icterus.   Neck: Normal range of motion. Neck supple. No JVD present. No tracheal deviation present. No thyromegaly present.   Cardiovascular: Normal heart sounds and intact distal pulses. An irregularly irregular rhythm present. Tachycardia present. PMI is not displaced. Exam reveals no gallop, no friction rub and no decreased pulses.   No murmur heard.  Pulses:       Carotid pulses are 3+ on the right side, and 3+ on the left side.       Radial pulses are 3+ on the right side, and 3+ on the left side.   Pulmonary/Chest: Effort normal and breath sounds normal. No respiratory distress. He has no wheezes. He has no rales. He exhibits no tenderness.   Abdominal: Soft. Bowel sounds are normal. He exhibits no distension, no abdominal bruit and no mass. There is no splenomegaly or hepatomegaly. There is no tenderness. There is no rebound and no guarding.   Musculoskeletal: Normal range of motion. He exhibits no edema, tenderness or deformity.   Lymphadenopathy:     He has no cervical adenopathy.   Neurological: He is alert. He has normal reflexes. No cranial nerve deficit. He exhibits normal muscle tone. Coordination normal.   Skin: Skin is warm and dry. No rash noted. He is not diaphoretic. No erythema.   Psychiatric: He has a normal mood and affect. His behavior is normal. Judgment and thought content normal.         Lab " Review  Lab Results   Component Value Date     12/19/2019    K 5.8 (H) 12/19/2019     12/19/2019    BUN 63 (H) 12/19/2019    CREATININE 1.87 (H) 12/19/2019    GLUCOSE 107 (H) 12/19/2019    CALCIUM 9.3 12/19/2019    ALT 65 (H) 12/06/2019    ALKPHOS 87 12/06/2019    LABIL2 1.7 11/23/2015     No results found for: CKTOTAL  Lab Results   Component Value Date    WBC 10.32 12/04/2019    HGB 16.7 12/04/2019    HCT 53.5 (H) 12/04/2019     12/04/2019     Lab Results   Component Value Date    INR 1.89 (H) 12/04/2019    INR 1.37 (H) 11/03/2019     Lab Results   Component Value Date    MG 2.6 (H) 12/04/2019     Lab Results   Component Value Date    PSA 1.870 11/16/2018    TSH 9.650 (H) 12/04/2019     No results found for: BNP  Lab Results   Component Value Date    CHLPL 118 11/23/2015    CHOL 73 12/05/2019    TRIG 50 12/05/2019    HDL 31 (L) 12/05/2019    VLDL 10 12/05/2019    LDLHDL 1.03 12/05/2019     Lab Results   Component Value Date    LDL 32 12/05/2019    LDL 83 05/16/2019         ECG 12 Lead  Date/Time: 12/23/2019 3:40 PM  Performed by: Samantha Iniguez MD  Authorized by: Samantha Iniguez MD   Comparison: compared with previous ECG from 12/5/2019  Comparison to previous ECG: Heart rate is faster today  Rhythm: atrial fibrillation  Rate: tachycardic  BPM: 117  QRS axis: normal  Other findings: non-specific ST-T wave changes and low voltage    Clinical impression: abnormal EKG               I personally viewed and interpreted the patient's LAB data         Assessment:     1. Palpitations    2. Atrial fibrillation, persistent with RVR     3. Nonrheumatic aortic valve stenosis    4. Advanced age    5. Chronic anticoagulation with low-dose Eliquis    6. Chronic renal failure, stage 3 (moderate) (CMS/HCC)          Plan:     Patient has persistent atrial fibrillation.  He was recently in the hospital with the rapid ventricular rate requiring IV Cardizem.  He was sent home on metoprolol tartrate  150 twice daily however patient change his medications and is taking 150 in the morning and 50 in the evening ( total of 200).    Patient was advised to change his metoprolol to 100 twice daily and then will increase it further depending on heart rate.  His blood pressure is running low he was advised to DC Lasix.  Patient appears to be dehydrated.  Eliquis 2.5 twice daily was continued.  He will have lab work done today    No follow-ups on file.

## 2019-12-26 NOTE — OUTREACH NOTE
CHF Week 3 Survey      Responses   Facility patient discharged from?  [facility]   Does the patient have one of the following disease processes/diagnoses(primary or secondary)?  CHF   Week 3 attempt successful?  Yes   Call start time  1202   Call end time  1218   Discharge diagnosis  Atrial fibrillation, persistent with RVR , SIRS, CHF   Is patient permission given to speak with other caregiver?  Yes   List who call center can speak with  relative Cheryle    Meds reviewed with patient/caregiver?  Yes   Is the patient having any side effects they believe may be caused by any medication additions or changes?  No   Does the patient have all medications ordered at discharge?  Yes   Is the patient taking all medications as directed (includes completed medication regime)?  Yes   Does the patient have a primary care provider?   Yes   Does the patient have an appointment with their PCP within 7 days of discharge?  Yes   Has the patient kept scheduled appointments due by today?  Yes   What is the Home health agency?   [facility].     Has home health visited the patient within 72 hours of discharge?  Yes   Psychosocial issues?  No   Did the patient receive a copy of their discharge instructions?  Yes   Nursing interventions  Reviewed instructions with patient   What is the patient's perception of their health status since discharge?  Same   Nursing interventions  Nurse provided patient education   Is the patient weighing daily?  Yes   Does the patient have scales?  Yes   Daily weight interventions  Education provided on importance of daily weight   Is the patient able to teach back Heart Failure diet management?  No [spoke to relative ]   Is the patient able to teach back Heart Failure Zones?  No [relative]   CHF Week 3 call completed?  Yes   Wrap up additional comments  relative stated pt non compliant most of the time. She wanted ideason how to get pt  to take meds etc. TOLD her to discuss with son and then they can talk to  doctor.          Roslyn Price, RN

## 2020-01-01 ENCOUNTER — APPOINTMENT (OUTPATIENT)
Dept: CT IMAGING | Facility: HOSPITAL | Age: 85
End: 2020-01-01

## 2020-01-01 ENCOUNTER — TELEPHONE (OUTPATIENT)
Dept: CARDIOLOGY | Facility: CLINIC | Age: 85
End: 2020-01-01

## 2020-01-01 ENCOUNTER — APPOINTMENT (OUTPATIENT)
Dept: GENERAL RADIOLOGY | Facility: HOSPITAL | Age: 85
End: 2020-01-01

## 2020-01-01 ENCOUNTER — APPOINTMENT (OUTPATIENT)
Dept: ULTRASOUND IMAGING | Facility: HOSPITAL | Age: 85
End: 2020-01-01

## 2020-01-01 ENCOUNTER — PATIENT OUTREACH (OUTPATIENT)
Dept: CASE MANAGEMENT | Facility: OTHER | Age: 85
End: 2020-01-01

## 2020-01-01 ENCOUNTER — CLINICAL SUPPORT NO REQUIREMENTS (OUTPATIENT)
Dept: CARDIOLOGY | Facility: CLINIC | Age: 85
End: 2020-01-01

## 2020-01-01 ENCOUNTER — OUTSIDE FACILITY SERVICE (OUTPATIENT)
Dept: FAMILY MEDICINE CLINIC | Facility: CLINIC | Age: 85
End: 2020-01-01

## 2020-01-01 ENCOUNTER — OFFICE VISIT (OUTPATIENT)
Dept: CARDIOLOGY | Facility: CLINIC | Age: 85
End: 2020-01-01

## 2020-01-01 ENCOUNTER — OFFICE VISIT (OUTPATIENT)
Dept: FAMILY MEDICINE CLINIC | Facility: CLINIC | Age: 85
End: 2020-01-01

## 2020-01-01 ENCOUNTER — READMISSION MANAGEMENT (OUTPATIENT)
Dept: CALL CENTER | Facility: HOSPITAL | Age: 85
End: 2020-01-01

## 2020-01-01 ENCOUNTER — APPOINTMENT (OUTPATIENT)
Dept: CARDIOLOGY | Facility: HOSPITAL | Age: 85
End: 2020-01-01

## 2020-01-01 ENCOUNTER — HOSPITAL ENCOUNTER (EMERGENCY)
Facility: HOSPITAL | Age: 85
Discharge: NURSING, REHAB OR LONG-TERM CARE | End: 2020-02-05
Attending: EMERGENCY MEDICINE | Admitting: EMERGENCY MEDICINE

## 2020-01-01 ENCOUNTER — HOSPITAL ENCOUNTER (INPATIENT)
Facility: HOSPITAL | Age: 85
LOS: 10 days | Discharge: NURSING, REHAB OR LONG-TERM CARE | End: 2020-01-31
Attending: INTERNAL MEDICINE | Admitting: INTERNAL MEDICINE

## 2020-01-01 VITALS
BODY MASS INDEX: 25.36 KG/M2 | HEIGHT: 66 IN | SYSTOLIC BLOOD PRESSURE: 122 MMHG | WEIGHT: 157.8 LBS | OXYGEN SATURATION: 93 % | DIASTOLIC BLOOD PRESSURE: 76 MMHG | HEART RATE: 86 BPM | TEMPERATURE: 97.5 F

## 2020-01-01 VITALS
RESPIRATION RATE: 16 BRPM | HEART RATE: 80 BPM | SYSTOLIC BLOOD PRESSURE: 114 MMHG | BODY MASS INDEX: 24.64 KG/M2 | OXYGEN SATURATION: 100 % | WEIGHT: 157 LBS | TEMPERATURE: 98.2 F | HEIGHT: 67 IN | DIASTOLIC BLOOD PRESSURE: 70 MMHG

## 2020-01-01 VITALS
HEART RATE: 83 BPM | BODY MASS INDEX: 23.54 KG/M2 | TEMPERATURE: 98 F | HEIGHT: 67 IN | RESPIRATION RATE: 18 BRPM | WEIGHT: 150 LBS | DIASTOLIC BLOOD PRESSURE: 61 MMHG | OXYGEN SATURATION: 99 % | SYSTOLIC BLOOD PRESSURE: 100 MMHG

## 2020-01-01 VITALS
WEIGHT: 155 LBS | OXYGEN SATURATION: 93 % | SYSTOLIC BLOOD PRESSURE: 112 MMHG | HEART RATE: 61 BPM | BODY MASS INDEX: 24.33 KG/M2 | DIASTOLIC BLOOD PRESSURE: 70 MMHG | HEIGHT: 67 IN

## 2020-01-01 DIAGNOSIS — I48.19 ATRIAL FIBRILLATION, PERSISTENT (HCC): ICD-10-CM

## 2020-01-01 DIAGNOSIS — I48.19 ATRIAL FIBRILLATION, PERSISTENT (HCC): Primary | Chronic | ICD-10-CM

## 2020-01-01 DIAGNOSIS — L03.114 CELLULITIS OF LEFT UPPER ARM: Primary | ICD-10-CM

## 2020-01-01 DIAGNOSIS — I35.0 NONRHEUMATIC AORTIC VALVE STENOSIS: Chronic | ICD-10-CM

## 2020-01-01 DIAGNOSIS — I50.22 CHRONIC SYSTOLIC CONGESTIVE HEART FAILURE (HCC): ICD-10-CM

## 2020-01-01 DIAGNOSIS — Z74.09 IMPAIRED MOBILITY AND ADLS: ICD-10-CM

## 2020-01-01 DIAGNOSIS — Z78.9 IMPAIRED MOBILITY AND ADLS: ICD-10-CM

## 2020-01-01 DIAGNOSIS — I48.19 PERSISTENT ATRIAL FIBRILLATION (HCC): Primary | ICD-10-CM

## 2020-01-01 DIAGNOSIS — I50.40 COMBINED SYSTOLIC AND DIASTOLIC CONGESTIVE HEART FAILURE, UNSPECIFIED HF CHRONICITY (HCC): Primary | ICD-10-CM

## 2020-01-01 DIAGNOSIS — I48.19 ATRIAL FIBRILLATION, PERSISTENT (HCC): Primary | ICD-10-CM

## 2020-01-01 DIAGNOSIS — R13.12 OROPHARYNGEAL DYSPHAGIA: Primary | ICD-10-CM

## 2020-01-01 DIAGNOSIS — N18.30 CHRONIC RENAL FAILURE, STAGE 3 (MODERATE) (HCC): Chronic | ICD-10-CM

## 2020-01-01 LAB
ALBUMIN SERPL-MCNC: 1.9 G/DL (ref 3.5–5.2)
ALBUMIN SERPL-MCNC: 2.6 G/DL (ref 3.5–5.2)
ALBUMIN/GLOB SERPL: 0.9 G/DL
ALP SERPL-CCNC: 101 U/L (ref 39–117)
ALP SERPL-CCNC: 90 U/L (ref 39–117)
ALT SERPL W P-5'-P-CCNC: 14 U/L (ref 1–41)
ALT SERPL W P-5'-P-CCNC: 27 U/L (ref 1–41)
ANION GAP SERPL CALCULATED.3IONS-SCNC: 10 MMOL/L (ref 5–15)
ANION GAP SERPL CALCULATED.3IONS-SCNC: 11 MMOL/L (ref 5–15)
ANION GAP SERPL CALCULATED.3IONS-SCNC: 12 MMOL/L (ref 5–15)
ANION GAP SERPL CALCULATED.3IONS-SCNC: 12 MMOL/L (ref 5–15)
ANION GAP SERPL CALCULATED.3IONS-SCNC: 13 MMOL/L (ref 5–15)
ANION GAP SERPL CALCULATED.3IONS-SCNC: 13.8 MMOL/L (ref 5–15)
ANION GAP SERPL CALCULATED.3IONS-SCNC: 14 MMOL/L (ref 5–15)
ANION GAP SERPL CALCULATED.3IONS-SCNC: 16 MMOL/L (ref 5–15)
ANION GAP SERPL CALCULATED.3IONS-SCNC: 6 MMOL/L (ref 5–15)
ANION GAP SERPL CALCULATED.3IONS-SCNC: 8 MMOL/L (ref 5–15)
ANION GAP SERPL CALCULATED.3IONS-SCNC: 8.8 MMOL/L (ref 5–15)
ANION GAP SERPL CALCULATED.3IONS-SCNC: 9 MMOL/L (ref 5–15)
ANISOCYTOSIS BLD QL: ABNORMAL
AST SERPL-CCNC: 26 U/L (ref 1–40)
AST SERPL-CCNC: 26 U/L (ref 1–40)
BACTERIA SPEC AEROBE CULT: NORMAL
BACTERIA SPEC AEROBE CULT: NORMAL
BASOPHILS # BLD AUTO: 0.01 10*3/MM3 (ref 0–0.2)
BASOPHILS # BLD AUTO: 0.02 10*3/MM3 (ref 0–0.2)
BASOPHILS # BLD AUTO: 0.02 10*3/MM3 (ref 0–0.2)
BASOPHILS # BLD AUTO: 0.03 10*3/MM3 (ref 0–0.2)
BASOPHILS NFR BLD AUTO: 0.1 % (ref 0–1.5)
BASOPHILS NFR BLD AUTO: 0.2 % (ref 0–1.5)
BASOPHILS NFR BLD AUTO: 0.2 % (ref 0–1.5)
BASOPHILS NFR BLD AUTO: 0.3 % (ref 0–1.5)
BH CV ECHO MEAS - AI DEC SLOPE: 202.3 CM/SEC^2
BH CV ECHO MEAS - AI MAX PG: 48.1 MMHG
BH CV ECHO MEAS - AI MAX VEL: 346.9 CM/SEC
BH CV ECHO MEAS - AI P1/2T: 502.2 MSEC
BH CV ECHO MEAS - AO MAX PG (FULL): 19.8 MMHG
BH CV ECHO MEAS - AO MAX PG: 21.8 MMHG
BH CV ECHO MEAS - AO MEAN PG (FULL): 11.8 MMHG
BH CV ECHO MEAS - AO MEAN PG: 13 MMHG
BH CV ECHO MEAS - AO ROOT AREA (BSA CORRECTED): 1.7
BH CV ECHO MEAS - AO ROOT AREA: 7.6 CM^2
BH CV ECHO MEAS - AO ROOT DIAM: 3.1 CM
BH CV ECHO MEAS - AO V2 MAX: 233.7 CM/SEC
BH CV ECHO MEAS - AO V2 MEAN: 171 CM/SEC
BH CV ECHO MEAS - AO V2 VTI: 46.2 CM
BH CV ECHO MEAS - AVA(I,A): 1.1 CM^2
BH CV ECHO MEAS - AVA(I,D): 1.1 CM^2
BH CV ECHO MEAS - AVA(V,A): 1 CM^2
BH CV ECHO MEAS - AVA(V,D): 1 CM^2
BH CV ECHO MEAS - BSA(HAYCOCK): 1.8 M^2
BH CV ECHO MEAS - BSA: 1.8 M^2
BH CV ECHO MEAS - BZI_BMI: 24.6 KILOGRAMS/M^2
BH CV ECHO MEAS - BZI_METRIC_HEIGHT: 170.2 CM
BH CV ECHO MEAS - BZI_METRIC_WEIGHT: 71.2 KG
BH CV ECHO MEAS - EDV(CUBED): 105.9 ML
BH CV ECHO MEAS - EDV(MOD-SP2): 66 ML
BH CV ECHO MEAS - EDV(MOD-SP4): 83 ML
BH CV ECHO MEAS - EDV(TEICH): 103.9 ML
BH CV ECHO MEAS - EF(CUBED): 61.7 %
BH CV ECHO MEAS - EF(MOD-BP): 27 %
BH CV ECHO MEAS - EF(MOD-SP2): 24.2 %
BH CV ECHO MEAS - EF(MOD-SP4): 27.7 %
BH CV ECHO MEAS - EF(TEICH): 53.2 %
BH CV ECHO MEAS - ESV(CUBED): 40.5 ML
BH CV ECHO MEAS - ESV(MOD-SP2): 50 ML
BH CV ECHO MEAS - ESV(MOD-SP4): 60 ML
BH CV ECHO MEAS - ESV(TEICH): 48.6 ML
BH CV ECHO MEAS - FS: 27.4 %
BH CV ECHO MEAS - IVS/LVPW: 1.2
BH CV ECHO MEAS - IVSD: 0.85 CM
BH CV ECHO MEAS - LA DIMENSION: 4.7 CM
BH CV ECHO MEAS - LA/AO: 1.5
BH CV ECHO MEAS - LAD MAJOR: 6.2 CM
BH CV ECHO MEAS - LAT PEAK E' VEL: 7.8 CM/SEC
BH CV ECHO MEAS - LATERAL E/E' RATIO: 16.3
BH CV ECHO MEAS - LV DIASTOLIC VOL/BSA (35-75): 45.5 ML/M^2
BH CV ECHO MEAS - LV MASS(C)D: 121.7 GRAMS
BH CV ECHO MEAS - LV MASS(C)DI: 66.7 GRAMS/M^2
BH CV ECHO MEAS - LV MAX PG: 2 MMHG
BH CV ECHO MEAS - LV MEAN PG: 1.2 MMHG
BH CV ECHO MEAS - LV SYSTOLIC VOL/BSA (12-30): 32.9 ML/M^2
BH CV ECHO MEAS - LV V1 MAX: 71 CM/SEC
BH CV ECHO MEAS - LV V1 MEAN: 51.3 CM/SEC
BH CV ECHO MEAS - LV V1 VTI: 14.2 CM
BH CV ECHO MEAS - LVIDD: 4.7 CM
BH CV ECHO MEAS - LVIDS: 3.4 CM
BH CV ECHO MEAS - LVLD AP2: 7.3 CM
BH CV ECHO MEAS - LVLD AP4: 7.4 CM
BH CV ECHO MEAS - LVLS AP2: 6.9 CM
BH CV ECHO MEAS - LVLS AP4: 6.9 CM
BH CV ECHO MEAS - LVOT AREA (M): 3.5 CM^2
BH CV ECHO MEAS - LVOT AREA: 3.4 CM^2
BH CV ECHO MEAS - LVOT DIAM: 2.1 CM
BH CV ECHO MEAS - LVPWD: 0.73 CM
BH CV ECHO MEAS - MED PEAK E' VEL: 6.3 CM/SEC
BH CV ECHO MEAS - MEDIAL E/E' RATIO: 20.3
BH CV ECHO MEAS - MV DEC SLOPE: 455.6 CM/SEC^2
BH CV ECHO MEAS - MV DEC TIME: 0.25 SEC
BH CV ECHO MEAS - MV E MAX VEL: 130 CM/SEC
BH CV ECHO MEAS - MV MAX PG: 8.5 MMHG
BH CV ECHO MEAS - MV MEAN PG: 3.6 MMHG
BH CV ECHO MEAS - MV P1/2T MAX VEL: 144.4 CM/SEC
BH CV ECHO MEAS - MV P1/2T: 92.8 MSEC
BH CV ECHO MEAS - MV V2 MAX: 146.1 CM/SEC
BH CV ECHO MEAS - MV V2 MEAN: 87.4 CM/SEC
BH CV ECHO MEAS - MV V2 VTI: 33.7 CM
BH CV ECHO MEAS - MVA P1/2T LCG: 1.5 CM^2
BH CV ECHO MEAS - MVA(P1/2T): 2.4 CM^2
BH CV ECHO MEAS - MVA(VTI): 1.4 CM^2
BH CV ECHO MEAS - PA ACC SLOPE: 639.3 CM/SEC^2
BH CV ECHO MEAS - PA ACC TIME: 0.11 SEC
BH CV ECHO MEAS - PA PR(ACCEL): 31.5 MMHG
BH CV ECHO MEAS - PI END-D VEL: 132.9 CM/SEC
BH CV ECHO MEAS - PULM DIAS VEL: 46 CM/SEC
BH CV ECHO MEAS - PULM S/D: 0.77
BH CV ECHO MEAS - PULM SYS VEL: 35.2 CM/SEC
BH CV ECHO MEAS - RAP SYSTOLE: 3 MMHG
BH CV ECHO MEAS - RVSP: 21 MMHG
BH CV ECHO MEAS - SI(AO): 192.5 ML/M^2
BH CV ECHO MEAS - SI(CUBED): 35.8 ML/M^2
BH CV ECHO MEAS - SI(LVOT): 26.8 ML/M^2
BH CV ECHO MEAS - SI(MOD-SP2): 8.8 ML/M^2
BH CV ECHO MEAS - SI(MOD-SP4): 12.6 ML/M^2
BH CV ECHO MEAS - SI(TEICH): 30.3 ML/M^2
BH CV ECHO MEAS - SV(AO): 351.3 ML
BH CV ECHO MEAS - SV(CUBED): 65.3 ML
BH CV ECHO MEAS - SV(LVOT): 48.8 ML
BH CV ECHO MEAS - SV(MOD-SP2): 16 ML
BH CV ECHO MEAS - SV(MOD-SP4): 23 ML
BH CV ECHO MEAS - SV(TEICH): 55.3 ML
BH CV ECHO MEAS - TAPSE (>1.6): 2.3 CM2
BH CV ECHO MEAS - TR MAX PG: 18 MMHG
BH CV ECHO MEAS - TR MAX VEL: 184 CM/SEC
BH CV ECHO MEASUREMENTS AVERAGE E/E' RATIO: 18.44
BH CV VAS BP LEFT ARM: NORMAL MMHG
BH CV XLRA - RV BASE: 4.3 CM
BH CV XLRA - RV LENGTH: 7.3 CM
BH CV XLRA - RV MID: 3.8 CM
BH CV XLRA - TDI S': 7.33 CM/SEC
BILIRUB CONJ SERPL-MCNC: 1.3 MG/DL (ref 0.2–0.3)
BILIRUB INDIRECT SERPL-MCNC: 0.7 MG/DL
BILIRUB SERPL-MCNC: 2 MG/DL (ref 0.2–1.2)
BILIRUB SERPL-MCNC: 3.3 MG/DL (ref 0.2–1.2)
BUN BLD-MCNC: 28 MG/DL (ref 8–23)
BUN BLD-MCNC: 29 MG/DL (ref 8–23)
BUN BLD-MCNC: 32 MG/DL (ref 8–23)
BUN BLD-MCNC: 33 MG/DL (ref 8–23)
BUN BLD-MCNC: 34 MG/DL (ref 8–23)
BUN BLD-MCNC: 34 MG/DL (ref 8–23)
BUN BLD-MCNC: 38 MG/DL (ref 8–23)
BUN BLD-MCNC: 39 MG/DL (ref 8–23)
BUN BLD-MCNC: 46 MG/DL (ref 8–23)
BUN BLD-MCNC: 56 MG/DL (ref 8–23)
BUN BLD-MCNC: 67 MG/DL (ref 8–23)
BUN BLD-MCNC: 72 MG/DL (ref 8–23)
BUN/CREAT SERPL: 27.9 (ref 7–25)
BUN/CREAT SERPL: 28.5 (ref 7–25)
BUN/CREAT SERPL: 31 (ref 7–25)
BUN/CREAT SERPL: 31.4 (ref 7–25)
BUN/CREAT SERPL: 32.9 (ref 7–25)
BUN/CREAT SERPL: 33.7 (ref 7–25)
BUN/CREAT SERPL: 34 (ref 7–25)
BUN/CREAT SERPL: 34.6 (ref 7–25)
BUN/CREAT SERPL: 36.1 (ref 7–25)
BUN/CREAT SERPL: 36.7 (ref 7–25)
BUN/CREAT SERPL: 37.1 (ref 7–25)
BUN/CREAT SERPL: 38 (ref 7–25)
BUN/CREAT SERPL: 38.1 (ref 7–25)
BUN/CREAT SERPL: 39.8 (ref 7–25)
CALCIUM SPEC-SCNC: 7.2 MG/DL (ref 8.6–10.5)
CALCIUM SPEC-SCNC: 7.3 MG/DL (ref 8.6–10.5)
CALCIUM SPEC-SCNC: 7.4 MG/DL (ref 8.6–10.5)
CALCIUM SPEC-SCNC: 7.5 MG/DL (ref 8.6–10.5)
CALCIUM SPEC-SCNC: 7.6 MG/DL (ref 8.6–10.5)
CALCIUM SPEC-SCNC: 7.8 MG/DL (ref 8.6–10.5)
CALCIUM SPEC-SCNC: 8 MG/DL (ref 8.6–10.5)
CALCIUM SPEC-SCNC: 8.2 MG/DL (ref 8.6–10.5)
CALCIUM SPEC-SCNC: 8.7 MG/DL (ref 8.6–10.5)
CALCIUM SPEC-SCNC: 8.7 MG/DL (ref 8.6–10.5)
CALCIUM SPEC-SCNC: 8.8 MG/DL (ref 8.6–10.5)
CHLORIDE SERPL-SCNC: 103 MMOL/L (ref 98–107)
CHLORIDE SERPL-SCNC: 104 MMOL/L (ref 98–107)
CHLORIDE SERPL-SCNC: 105 MMOL/L (ref 98–107)
CHLORIDE SERPL-SCNC: 106 MMOL/L (ref 98–107)
CHLORIDE SERPL-SCNC: 107 MMOL/L (ref 98–107)
CHLORIDE SERPL-SCNC: 107 MMOL/L (ref 98–107)
CHLORIDE SERPL-SCNC: 108 MMOL/L (ref 98–107)
CHLORIDE SERPL-SCNC: 109 MMOL/L (ref 98–107)
CHLORIDE SERPL-SCNC: 110 MMOL/L (ref 98–107)
CHLORIDE SERPL-SCNC: 110 MMOL/L (ref 98–107)
CK SERPL-CCNC: 36 U/L (ref 20–200)
CO2 SERPL-SCNC: 18 MMOL/L (ref 22–29)
CO2 SERPL-SCNC: 19 MMOL/L (ref 22–29)
CO2 SERPL-SCNC: 20 MMOL/L (ref 22–29)
CO2 SERPL-SCNC: 20 MMOL/L (ref 22–29)
CO2 SERPL-SCNC: 22 MMOL/L (ref 22–29)
CO2 SERPL-SCNC: 22.2 MMOL/L (ref 22–29)
CO2 SERPL-SCNC: 23 MMOL/L (ref 22–29)
CO2 SERPL-SCNC: 24.2 MMOL/L (ref 22–29)
CO2 SERPL-SCNC: 27 MMOL/L (ref 22–29)
CREAT BLD-MCNC: 0.79 MG/DL (ref 0.76–1.27)
CREAT BLD-MCNC: 0.81 MG/DL (ref 0.76–1.27)
CREAT BLD-MCNC: 0.84 MG/DL (ref 0.76–1.27)
CREAT BLD-MCNC: 0.98 MG/DL (ref 0.76–1.27)
CREAT BLD-MCNC: 1 MG/DL (ref 0.76–1.27)
CREAT BLD-MCNC: 1 MG/DL (ref 0.76–1.27)
CREAT BLD-MCNC: 1.01 MG/DL (ref 0.76–1.27)
CREAT BLD-MCNC: 1.05 MG/DL (ref 0.76–1.27)
CREAT BLD-MCNC: 1.08 MG/DL (ref 0.76–1.27)
CREAT BLD-MCNC: 1.24 MG/DL (ref 0.76–1.27)
CREAT BLD-MCNC: 1.4 MG/DL (ref 0.76–1.27)
CREAT BLD-MCNC: 1.7 MG/DL (ref 0.76–1.27)
CREAT BLD-MCNC: 2.16 MG/DL (ref 0.76–1.27)
CREAT BLD-MCNC: 2.53 MG/DL (ref 0.76–1.27)
CRP SERPL-MCNC: 11.54 MG/DL (ref 0–0.5)
CYTOLOGIST CVX/VAG CYTO: NORMAL
DEPRECATED RDW RBC AUTO: 51.9 FL (ref 37–54)
DEPRECATED RDW RBC AUTO: 53.3 FL (ref 37–54)
DEPRECATED RDW RBC AUTO: 53.4 FL (ref 37–54)
DEPRECATED RDW RBC AUTO: 53.5 FL (ref 37–54)
DEPRECATED RDW RBC AUTO: 54.6 FL (ref 37–54)
DEPRECATED RDW RBC AUTO: 54.6 FL (ref 37–54)
DEPRECATED RDW RBC AUTO: 56.9 FL (ref 37–54)
EOSINOPHIL # BLD AUTO: 0.07 10*3/MM3 (ref 0–0.4)
EOSINOPHIL # BLD AUTO: 0.17 10*3/MM3 (ref 0–0.4)
EOSINOPHIL # BLD MANUAL: 0.13 10*3/MM3 (ref 0–0.4)
EOSINOPHIL NFR BLD AUTO: 0.6 % (ref 0.3–6.2)
EOSINOPHIL NFR BLD AUTO: 1.5 % (ref 0.3–6.2)
EOSINOPHIL NFR BLD AUTO: 1.7 % (ref 0.3–6.2)
EOSINOPHIL NFR BLD AUTO: 2 % (ref 0.3–6.2)
EOSINOPHIL NFR BLD MANUAL: 2 % (ref 0.3–6.2)
ERYTHROCYTE [DISTWIDTH] IN BLOOD BY AUTOMATED COUNT: 16.5 % (ref 12.3–15.4)
ERYTHROCYTE [DISTWIDTH] IN BLOOD BY AUTOMATED COUNT: 16.8 % (ref 12.3–15.4)
ERYTHROCYTE [DISTWIDTH] IN BLOOD BY AUTOMATED COUNT: 16.8 % (ref 12.3–15.4)
ERYTHROCYTE [DISTWIDTH] IN BLOOD BY AUTOMATED COUNT: 16.9 % (ref 12.3–15.4)
ERYTHROCYTE [DISTWIDTH] IN BLOOD BY AUTOMATED COUNT: 17 % (ref 12.3–15.4)
ERYTHROCYTE [DISTWIDTH] IN BLOOD BY AUTOMATED COUNT: 17.3 % (ref 12.3–15.4)
ERYTHROCYTE [DISTWIDTH] IN BLOOD BY AUTOMATED COUNT: 17.3 % (ref 12.3–15.4)
GFR SERPL CREATININE-BSD FRML MDRD: 24 ML/MIN/1.73
GFR SERPL CREATININE-BSD FRML MDRD: 29 ML/MIN/1.73
GFR SERPL CREATININE-BSD FRML MDRD: 38 ML/MIN/1.73
GFR SERPL CREATININE-BSD FRML MDRD: 48 ML/MIN/1.73
GFR SERPL CREATININE-BSD FRML MDRD: 55 ML/MIN/1.73
GFR SERPL CREATININE-BSD FRML MDRD: 64 ML/MIN/1.73
GFR SERPL CREATININE-BSD FRML MDRD: 67 ML/MIN/1.73
GFR SERPL CREATININE-BSD FRML MDRD: 70 ML/MIN/1.73
GFR SERPL CREATININE-BSD FRML MDRD: 72 ML/MIN/1.73
GFR SERPL CREATININE-BSD FRML MDRD: 86 ML/MIN/1.73
GFR SERPL CREATININE-BSD FRML MDRD: 90 ML/MIN/1.73
GFR SERPL CREATININE-BSD FRML MDRD: 92 ML/MIN/1.73
GLOBULIN UR ELPH-MCNC: 2.8 GM/DL
GLUCOSE BLD-MCNC: 118 MG/DL (ref 65–99)
GLUCOSE BLD-MCNC: 130 MG/DL (ref 65–99)
GLUCOSE BLD-MCNC: 48 MG/DL (ref 65–99)
GLUCOSE BLD-MCNC: 65 MG/DL (ref 65–99)
GLUCOSE BLD-MCNC: 67 MG/DL (ref 65–99)
GLUCOSE BLD-MCNC: 71 MG/DL (ref 65–99)
GLUCOSE BLD-MCNC: 77 MG/DL (ref 65–99)
GLUCOSE BLD-MCNC: 81 MG/DL (ref 65–99)
GLUCOSE BLD-MCNC: 83 MG/DL (ref 65–99)
GLUCOSE BLD-MCNC: 89 MG/DL (ref 65–99)
GLUCOSE BLD-MCNC: 90 MG/DL (ref 65–99)
GLUCOSE BLD-MCNC: 90 MG/DL (ref 65–99)
GLUCOSE BLDC GLUCOMTR-MCNC: 122 MG/DL (ref 70–130)
GLUCOSE BLDC GLUCOMTR-MCNC: 53 MG/DL (ref 70–130)
GLUCOSE BLDC GLUCOMTR-MCNC: 53 MG/DL (ref 70–130)
HCT VFR BLD AUTO: 37.9 % (ref 37.5–51)
HCT VFR BLD AUTO: 39.2 % (ref 37.5–51)
HCT VFR BLD AUTO: 40.5 % (ref 37.5–51)
HCT VFR BLD AUTO: 40.7 % (ref 37.5–51)
HCT VFR BLD AUTO: 42.1 % (ref 37.5–51)
HCT VFR BLD AUTO: 43.5 % (ref 37.5–51)
HCT VFR BLD AUTO: 43.6 % (ref 37.5–51)
HCT VFR BLD AUTO: 45.2 % (ref 37.5–51)
HCT VFR BLD AUTO: 47.1 % (ref 37.5–51)
HGB BLD-MCNC: 12.1 G/DL (ref 13–17.7)
HGB BLD-MCNC: 12.7 G/DL (ref 13–17.7)
HGB BLD-MCNC: 12.9 G/DL (ref 13–17.7)
HGB BLD-MCNC: 13 G/DL (ref 13–17.7)
HGB BLD-MCNC: 13.8 G/DL (ref 13–17.7)
HGB BLD-MCNC: 14 G/DL (ref 13–17.7)
HGB BLD-MCNC: 14.1 G/DL (ref 13–17.7)
HGB BLD-MCNC: 14.3 G/DL (ref 13–17.7)
HGB BLD-MCNC: 14.4 G/DL (ref 13–17.7)
HYPOCHROMIA BLD QL: ABNORMAL
IMM GRANULOCYTES # BLD AUTO: 0.11 10*3/MM3 (ref 0–0.05)
IMM GRANULOCYTES # BLD AUTO: 0.12 10*3/MM3 (ref 0–0.05)
IMM GRANULOCYTES # BLD AUTO: 0.14 10*3/MM3 (ref 0–0.05)
IMM GRANULOCYTES # BLD AUTO: 0.15 10*3/MM3 (ref 0–0.05)
IMM GRANULOCYTES NFR BLD AUTO: 1 % (ref 0–0.5)
IMM GRANULOCYTES NFR BLD AUTO: 1.1 % (ref 0–0.5)
IMM GRANULOCYTES NFR BLD AUTO: 1.3 % (ref 0–0.5)
IMM GRANULOCYTES NFR BLD AUTO: 1.7 % (ref 0–0.5)
INR PPP: 1.71 (ref 0.85–1.16)
LEFT ATRIUM VOLUME INDEX: 37.8 ML/M^2
LEFT ATRIUM VOLUME: 69 ML
LYMPHOCYTES # BLD AUTO: 0.49 10*3/MM3 (ref 0.7–3.1)
LYMPHOCYTES # BLD AUTO: 0.53 10*3/MM3 (ref 0.7–3.1)
LYMPHOCYTES # BLD AUTO: 0.55 10*3/MM3 (ref 0.7–3.1)
LYMPHOCYTES # BLD AUTO: 0.64 10*3/MM3 (ref 0.7–3.1)
LYMPHOCYTES # BLD MANUAL: 0.25 10*3/MM3 (ref 0.7–3.1)
LYMPHOCYTES NFR BLD AUTO: 4.7 % (ref 19.6–45.3)
LYMPHOCYTES NFR BLD AUTO: 4.9 % (ref 19.6–45.3)
LYMPHOCYTES NFR BLD AUTO: 5.6 % (ref 19.6–45.3)
LYMPHOCYTES NFR BLD AUTO: 6.5 % (ref 19.6–45.3)
LYMPHOCYTES NFR BLD MANUAL: 3 % (ref 5–12)
LYMPHOCYTES NFR BLD MANUAL: 4 % (ref 19.6–45.3)
MAGNESIUM SERPL-MCNC: 1.9 MG/DL (ref 1.6–2.4)
MAGNESIUM SERPL-MCNC: 2 MG/DL (ref 1.6–2.4)
MAGNESIUM SERPL-MCNC: 2.1 MG/DL (ref 1.6–2.4)
MAGNESIUM SERPL-MCNC: 2.2 MG/DL (ref 1.6–2.4)
MAGNESIUM SERPL-MCNC: 2.3 MG/DL (ref 1.6–2.4)
MAGNESIUM SERPL-MCNC: 2.3 MG/DL (ref 1.6–2.4)
MAGNESIUM SERPL-MCNC: 2.4 MG/DL (ref 1.6–2.4)
MAGNESIUM SERPL-MCNC: 2.4 MG/DL (ref 1.6–2.4)
MAGNESIUM SERPL-MCNC: 2.7 MG/DL (ref 1.6–2.4)
MCH RBC QN AUTO: 28 PG (ref 26.6–33)
MCH RBC QN AUTO: 28.1 PG (ref 26.6–33)
MCH RBC QN AUTO: 28.4 PG (ref 26.6–33)
MCH RBC QN AUTO: 28.7 PG (ref 26.6–33)
MCH RBC QN AUTO: 28.8 PG (ref 26.6–33)
MCH RBC QN AUTO: 28.9 PG (ref 26.6–33)
MCH RBC QN AUTO: 28.9 PG (ref 26.6–33)
MCH RBC QN AUTO: 29.1 PG (ref 26.6–33)
MCH RBC QN AUTO: 29.2 PG (ref 26.6–33)
MCHC RBC AUTO-ENTMCNC: 30.6 G/DL (ref 31.5–35.7)
MCHC RBC AUTO-ENTMCNC: 31.2 G/DL (ref 31.5–35.7)
MCHC RBC AUTO-ENTMCNC: 31.7 G/DL (ref 31.5–35.7)
MCHC RBC AUTO-ENTMCNC: 31.9 G/DL (ref 31.5–35.7)
MCHC RBC AUTO-ENTMCNC: 32.1 G/DL (ref 31.5–35.7)
MCHC RBC AUTO-ENTMCNC: 32.2 G/DL (ref 31.5–35.7)
MCHC RBC AUTO-ENTMCNC: 32.4 G/DL (ref 31.5–35.7)
MCHC RBC AUTO-ENTMCNC: 32.8 G/DL (ref 31.5–35.7)
MCHC RBC AUTO-ENTMCNC: 32.8 G/DL (ref 31.5–35.7)
MCV RBC AUTO: 87.9 FL (ref 79–97)
MCV RBC AUTO: 88.3 FL (ref 79–97)
MCV RBC AUTO: 89 FL (ref 79–97)
MCV RBC AUTO: 89 FL (ref 79–97)
MCV RBC AUTO: 89.1 FL (ref 79–97)
MCV RBC AUTO: 89.1 FL (ref 79–97)
MCV RBC AUTO: 90 FL (ref 79–97)
MCV RBC AUTO: 90.6 FL (ref 79–97)
MCV RBC AUTO: 94.6 FL (ref 79–97)
MONOCYTES # BLD AUTO: 0.19 10*3/MM3 (ref 0.1–0.9)
MONOCYTES # BLD AUTO: 0.34 10*3/MM3 (ref 0.1–0.9)
MONOCYTES # BLD AUTO: 0.36 10*3/MM3 (ref 0.1–0.9)
MONOCYTES # BLD AUTO: 0.4 10*3/MM3 (ref 0.1–0.9)
MONOCYTES # BLD AUTO: 0.44 10*3/MM3 (ref 0.1–0.9)
MONOCYTES NFR BLD AUTO: 3.3 % (ref 5–12)
MONOCYTES NFR BLD AUTO: 3.8 % (ref 5–12)
MONOCYTES NFR BLD AUTO: 3.9 % (ref 5–12)
MONOCYTES NFR BLD AUTO: 4.1 % (ref 5–12)
NEUTROPHILS # BLD AUTO: 10.32 10*3/MM3 (ref 1.7–7)
NEUTROPHILS # BLD AUTO: 5.73 10*3/MM3 (ref 1.7–7)
NEUTROPHILS # BLD AUTO: 7.52 10*3/MM3 (ref 1.7–7)
NEUTROPHILS # BLD AUTO: 8.46 10*3/MM3 (ref 1.7–7)
NEUTROPHILS # BLD AUTO: 9.71 10*3/MM3 (ref 1.7–7)
NEUTROPHILS NFR BLD AUTO: 86.4 % (ref 42.7–76)
NEUTROPHILS NFR BLD AUTO: 86.6 % (ref 42.7–76)
NEUTROPHILS NFR BLD AUTO: 88.7 % (ref 42.7–76)
NEUTROPHILS NFR BLD AUTO: 89.8 % (ref 42.7–76)
NEUTROPHILS NFR BLD MANUAL: 81 % (ref 42.7–76)
NEUTS BAND NFR BLD MANUAL: 10 % (ref 0–5)
NRBC BLD AUTO-RTO: 0 /100 WBC (ref 0–0.2)
NT-PROBNP SERPL-MCNC: ABNORMAL PG/ML (ref 5–1800)
PATH INTERP BLD-IMP: NORMAL
PLAT MORPH BLD: NORMAL
PLATELET # BLD AUTO: 166 10*3/MM3 (ref 140–450)
PLATELET # BLD AUTO: 63 10*3/MM3 (ref 140–450)
PLATELET # BLD AUTO: 68 10*3/MM3 (ref 140–450)
PLATELET # BLD AUTO: 71 10*3/MM3 (ref 140–450)
PLATELET # BLD AUTO: 76 10*3/MM3 (ref 140–450)
PLATELET # BLD AUTO: 80 10*3/MM3 (ref 140–450)
PLATELET # BLD AUTO: 92 10*3/MM3 (ref 140–450)
PLATELET # BLD AUTO: 95 10*3/MM3 (ref 140–450)
PLATELET # BLD AUTO: 99 10*3/MM3 (ref 140–450)
PLATELETS (CITRATED) BY AUTOMATED COUNT: 58 10*3/MM3 (ref 140–450)
PMV BLD AUTO: 10.8 FL (ref 6–12)
PMV BLD AUTO: 11.2 FL (ref 6–12)
PMV BLD AUTO: 11.6 FL (ref 6–12)
PMV BLD AUTO: 11.6 FL (ref 6–12)
PMV BLD AUTO: 12 FL (ref 6–12)
PMV BLD AUTO: 12.4 FL (ref 6–12)
PMV BLD AUTO: 12.8 FL (ref 6–12)
POTASSIUM BLD-SCNC: 3.4 MMOL/L (ref 3.5–5.2)
POTASSIUM BLD-SCNC: 3.7 MMOL/L (ref 3.5–5.2)
POTASSIUM BLD-SCNC: 3.7 MMOL/L (ref 3.5–5.2)
POTASSIUM BLD-SCNC: 3.8 MMOL/L (ref 3.5–5.2)
POTASSIUM BLD-SCNC: 3.8 MMOL/L (ref 3.5–5.2)
POTASSIUM BLD-SCNC: 3.9 MMOL/L (ref 3.5–5.2)
POTASSIUM BLD-SCNC: 4 MMOL/L (ref 3.5–5.2)
POTASSIUM BLD-SCNC: 4 MMOL/L (ref 3.5–5.2)
POTASSIUM BLD-SCNC: 4.2 MMOL/L (ref 3.5–5.2)
POTASSIUM BLD-SCNC: 4.3 MMOL/L (ref 3.5–5.2)
POTASSIUM BLD-SCNC: 4.5 MMOL/L (ref 3.5–5.2)
POTASSIUM BLD-SCNC: 4.6 MMOL/L (ref 3.5–5.2)
POTASSIUM BLD-SCNC: 5.2 MMOL/L (ref 3.5–5.2)
PROT SERPL-MCNC: 4.5 G/DL (ref 6–8.5)
PROT SERPL-MCNC: 5.4 G/DL (ref 6–8.5)
PROTHROMBIN TIME: 19.3 SECONDS (ref 11.2–14.3)
RBC # BLD AUTO: 4.26 10*6/MM3 (ref 4.14–5.8)
RBC # BLD AUTO: 4.4 10*6/MM3 (ref 4.14–5.8)
RBC # BLD AUTO: 4.47 10*6/MM3 (ref 4.14–5.8)
RBC # BLD AUTO: 4.61 10*6/MM3 (ref 4.14–5.8)
RBC # BLD AUTO: 4.79 10*6/MM3 (ref 4.14–5.8)
RBC # BLD AUTO: 4.88 10*6/MM3 (ref 4.14–5.8)
RBC # BLD AUTO: 4.9 10*6/MM3 (ref 4.14–5.8)
RBC # BLD AUTO: 4.98 10*6/MM3 (ref 4.14–5.8)
RBC # BLD AUTO: 5.02 10*6/MM3 (ref 4.14–5.8)
SCAN SLIDE: NORMAL
SODIUM BLD-SCNC: 138 MMOL/L (ref 136–145)
SODIUM BLD-SCNC: 139 MMOL/L (ref 136–145)
SODIUM BLD-SCNC: 140 MMOL/L (ref 136–145)
SODIUM BLD-SCNC: 141 MMOL/L (ref 136–145)
SODIUM BLD-SCNC: 142 MMOL/L (ref 136–145)
SODIUM BLD-SCNC: 142 MMOL/L (ref 136–145)
WBC NRBC COR # BLD: 10.28 10*3/MM3 (ref 3.4–10.8)
WBC NRBC COR # BLD: 10.82 10*3/MM3 (ref 3.4–10.8)
WBC NRBC COR # BLD: 11.63 10*3/MM3 (ref 3.4–10.8)
WBC NRBC COR # BLD: 12.74 10*3/MM3 (ref 3.4–10.8)
WBC NRBC COR # BLD: 6.3 10*3/MM3 (ref 3.4–10.8)
WBC NRBC COR # BLD: 8.69 10*3/MM3 (ref 3.4–10.8)
WBC NRBC COR # BLD: 9.08 10*3/MM3 (ref 3.4–10.8)
WBC NRBC COR # BLD: 9.76 10*3/MM3 (ref 3.4–10.8)
WBC NRBC COR # BLD: 9.8 10*3/MM3 (ref 3.4–10.8)

## 2020-01-01 PROCEDURE — 99152 MOD SED SAME PHYS/QHP 5/>YRS: CPT | Performed by: INTERNAL MEDICINE

## 2020-01-01 PROCEDURE — 99306 1ST NF CARE HIGH MDM 50: CPT | Performed by: FAMILY MEDICINE

## 2020-01-01 PROCEDURE — 71046 X-RAY EXAM CHEST 2 VIEWS: CPT

## 2020-01-01 PROCEDURE — 25010000002 MEROPENEM PER 100 MG

## 2020-01-01 PROCEDURE — 92526 ORAL FUNCTION THERAPY: CPT

## 2020-01-01 PROCEDURE — 85060 BLOOD SMEAR INTERPRETATION: CPT | Performed by: INTERNAL MEDICINE

## 2020-01-01 PROCEDURE — 94640 AIRWAY INHALATION TREATMENT: CPT

## 2020-01-01 PROCEDURE — 97110 THERAPEUTIC EXERCISES: CPT

## 2020-01-01 PROCEDURE — C1898 LEAD, PMKR, OTHER THAN TRANS: HCPCS | Performed by: INTERNAL MEDICINE

## 2020-01-01 PROCEDURE — 99232 SBSQ HOSP IP/OBS MODERATE 35: CPT | Performed by: INTERNAL MEDICINE

## 2020-01-01 PROCEDURE — 99223 1ST HOSP IP/OBS HIGH 75: CPT | Performed by: FAMILY MEDICINE

## 2020-01-01 PROCEDURE — 85049 AUTOMATED PLATELET COUNT: CPT | Performed by: INTERNAL MEDICINE

## 2020-01-01 PROCEDURE — 83880 ASSAY OF NATRIURETIC PEPTIDE: CPT | Performed by: INTERNAL MEDICINE

## 2020-01-01 PROCEDURE — 99153 MOD SED SAME PHYS/QHP EA: CPT | Performed by: INTERNAL MEDICINE

## 2020-01-01 PROCEDURE — 93650 ICAR CATH ABLTJ AV NODE FUNC: CPT | Performed by: INTERNAL MEDICINE

## 2020-01-01 PROCEDURE — 99232 SBSQ HOSP IP/OBS MODERATE 35: CPT | Performed by: HOSPITALIST

## 2020-01-01 PROCEDURE — 83735 ASSAY OF MAGNESIUM: CPT | Performed by: INTERNAL MEDICINE

## 2020-01-01 PROCEDURE — 97166 OT EVAL MOD COMPLEX 45 MIN: CPT | Performed by: OCCUPATIONAL THERAPIST

## 2020-01-01 PROCEDURE — 97116 GAIT TRAINING THERAPY: CPT

## 2020-01-01 PROCEDURE — 72125 CT NECK SPINE W/O DYE: CPT

## 2020-01-01 PROCEDURE — 99214 OFFICE O/P EST MOD 30 MIN: CPT | Performed by: FAMILY MEDICINE

## 2020-01-01 PROCEDURE — 93005 ELECTROCARDIOGRAM TRACING: CPT | Performed by: INTERNAL MEDICINE

## 2020-01-01 PROCEDURE — 80048 BASIC METABOLIC PNL TOTAL CA: CPT | Performed by: INTERNAL MEDICINE

## 2020-01-01 PROCEDURE — 80048 BASIC METABOLIC PNL TOTAL CA: CPT | Performed by: FAMILY MEDICINE

## 2020-01-01 PROCEDURE — 25010000002 DAPTOMYCIN PER 1 MG: Performed by: NURSE PRACTITIONER

## 2020-01-01 PROCEDURE — 85025 COMPLETE CBC W/AUTO DIFF WBC: CPT | Performed by: INTERNAL MEDICINE

## 2020-01-01 PROCEDURE — 80048 BASIC METABOLIC PNL TOTAL CA: CPT | Performed by: NURSE PRACTITIONER

## 2020-01-01 PROCEDURE — 33207 INSERT HEART PM VENTRICULAR: CPT | Performed by: INTERNAL MEDICINE

## 2020-01-01 PROCEDURE — 02HL3JZ INSERTION OF PACEMAKER LEAD INTO LEFT VENTRICLE, PERCUTANEOUS APPROACH: ICD-10-PCS | Performed by: INTERNAL MEDICINE

## 2020-01-01 PROCEDURE — 97162 PT EVAL MOD COMPLEX 30 MIN: CPT

## 2020-01-01 PROCEDURE — 99215 OFFICE O/P EST HI 40 MIN: CPT | Performed by: INTERNAL MEDICINE

## 2020-01-01 PROCEDURE — 25010000002 MIDAZOLAM PER 1 MG: Performed by: INTERNAL MEDICINE

## 2020-01-01 PROCEDURE — 36415 COLL VENOUS BLD VENIPUNCTURE: CPT

## 2020-01-01 PROCEDURE — 97530 THERAPEUTIC ACTIVITIES: CPT

## 2020-01-01 PROCEDURE — 85007 BL SMEAR W/DIFF WBC COUNT: CPT | Performed by: NURSE PRACTITIONER

## 2020-01-01 PROCEDURE — 73080 X-RAY EXAM OF ELBOW: CPT

## 2020-01-01 PROCEDURE — 82962 GLUCOSE BLOOD TEST: CPT

## 2020-01-01 PROCEDURE — 25010000002 HYDROCORTISONE SODIUM SUCCINATE 100 MG RECONSTITUTED SOLUTION: Performed by: FAMILY MEDICINE

## 2020-01-01 PROCEDURE — 97530 THERAPEUTIC ACTIVITIES: CPT | Performed by: OCCUPATIONAL THERAPIST

## 2020-01-01 PROCEDURE — 92611 MOTION FLUOROSCOPY/SWALLOW: CPT

## 2020-01-01 PROCEDURE — 85027 COMPLETE CBC AUTOMATED: CPT

## 2020-01-01 PROCEDURE — 82550 ASSAY OF CK (CPK): CPT | Performed by: NURSE PRACTITIONER

## 2020-01-01 PROCEDURE — 94660 CPAP INITIATION&MGMT: CPT

## 2020-01-01 PROCEDURE — 84132 ASSAY OF SERUM POTASSIUM: CPT | Performed by: INTERNAL MEDICINE

## 2020-01-01 PROCEDURE — C1733 CATH, EP, OTHR THAN COOL-TIP: HCPCS | Performed by: INTERNAL MEDICINE

## 2020-01-01 PROCEDURE — 99284 EMERGENCY DEPT VISIT MOD MDM: CPT

## 2020-01-01 PROCEDURE — C2621 PMKR, OTHER THAN SING/DUAL: HCPCS | Performed by: INTERNAL MEDICINE

## 2020-01-01 PROCEDURE — 99233 SBSQ HOSP IP/OBS HIGH 50: CPT | Performed by: INTERNAL MEDICINE

## 2020-01-01 PROCEDURE — 33225 L VENTRIC PACING LEAD ADD-ON: CPT | Performed by: INTERNAL MEDICINE

## 2020-01-01 PROCEDURE — 93306 TTE W/DOPPLER COMPLETE: CPT | Performed by: INTERNAL MEDICINE

## 2020-01-01 PROCEDURE — 86140 C-REACTIVE PROTEIN: CPT | Performed by: NURSE PRACTITIONER

## 2020-01-01 PROCEDURE — 0JH607Z INSERTION OF CARDIAC RESYNCHRONIZATION PACEMAKER PULSE GENERATOR INTO CHEST SUBCUTANEOUS TISSUE AND FASCIA, OPEN APPROACH: ICD-10-PCS | Performed by: INTERNAL MEDICINE

## 2020-01-01 PROCEDURE — 85027 COMPLETE CBC AUTOMATED: CPT | Performed by: HOSPITALIST

## 2020-01-01 PROCEDURE — 25010000002 DOPAMINE PER 40 MG: Performed by: INTERNAL MEDICINE

## 2020-01-01 PROCEDURE — 70450 CT HEAD/BRAIN W/O DYE: CPT

## 2020-01-01 PROCEDURE — 85025 COMPLETE CBC W/AUTO DIFF WBC: CPT | Performed by: NURSE PRACTITIONER

## 2020-01-01 PROCEDURE — 94799 UNLISTED PULMONARY SVC/PX: CPT

## 2020-01-01 PROCEDURE — 85027 COMPLETE CBC AUTOMATED: CPT | Performed by: INTERNAL MEDICINE

## 2020-01-01 PROCEDURE — 25010000002 VANCOMYCIN 10 G RECONSTITUTED SOLUTION

## 2020-01-01 PROCEDURE — 85025 COMPLETE CBC W/AUTO DIFF WBC: CPT | Performed by: HOSPITALIST

## 2020-01-01 PROCEDURE — 99239 HOSP IP/OBS DSCHRG MGMT >30: CPT | Performed by: INTERNAL MEDICINE

## 2020-01-01 PROCEDURE — 99232 SBSQ HOSP IP/OBS MODERATE 35: CPT | Performed by: PHYSICIAN ASSISTANT

## 2020-01-01 PROCEDURE — 25010000002 CEFEPIME PER 500 MG: Performed by: HOSPITALIST

## 2020-01-01 PROCEDURE — 85610 PROTHROMBIN TIME: CPT | Performed by: INTERNAL MEDICINE

## 2020-01-01 PROCEDURE — 25010000003 CEFAZOLIN IN DEXTROSE 2-4 GM/100ML-% SOLUTION: Performed by: INTERNAL MEDICINE

## 2020-01-01 PROCEDURE — 02583ZZ DESTRUCTION OF CONDUCTION MECHANISM, PERCUTANEOUS APPROACH: ICD-10-PCS | Performed by: INTERNAL MEDICINE

## 2020-01-01 PROCEDURE — 25010000002 FUROSEMIDE PER 20 MG: Performed by: PHYSICIAN ASSISTANT

## 2020-01-01 PROCEDURE — C1900 LEAD, CORONARY VENOUS: HCPCS | Performed by: INTERNAL MEDICINE

## 2020-01-01 PROCEDURE — 25010000002 DIGOXIN PER 500 MCG: Performed by: INTERNAL MEDICINE

## 2020-01-01 PROCEDURE — 93306 TTE W/DOPPLER COMPLETE: CPT

## 2020-01-01 PROCEDURE — 25010000002 MEROPENEM PER 100 MG: Performed by: FAMILY MEDICINE

## 2020-01-01 PROCEDURE — 97535 SELF CARE MNGMENT TRAINING: CPT | Performed by: OCCUPATIONAL THERAPIST

## 2020-01-01 PROCEDURE — 97535 SELF CARE MNGMENT TRAINING: CPT

## 2020-01-01 PROCEDURE — C1893 INTRO/SHEATH, FIXED,NON-PEEL: HCPCS | Performed by: INTERNAL MEDICINE

## 2020-01-01 PROCEDURE — 93971 EXTREMITY STUDY: CPT

## 2020-01-01 PROCEDURE — 0 IOPAMIDOL PER 1 ML: Performed by: INTERNAL MEDICINE

## 2020-01-01 PROCEDURE — 96372 THER/PROPH/DIAG INJ SC/IM: CPT

## 2020-01-01 PROCEDURE — 25010000002 CEFTRIAXONE PER 250 MG: Performed by: NURSE PRACTITIONER

## 2020-01-01 PROCEDURE — 74230 X-RAY XM SWLNG FUNCJ C+: CPT

## 2020-01-01 PROCEDURE — 25010000003 POTASSIUM CHLORIDE 10 MEQ/100ML SOLUTION: Performed by: PHYSICIAN ASSISTANT

## 2020-01-01 PROCEDURE — 80053 COMPREHEN METABOLIC PANEL: CPT | Performed by: INTERNAL MEDICINE

## 2020-01-01 PROCEDURE — 02HK3JZ INSERTION OF PACEMAKER LEAD INTO RIGHT VENTRICLE, PERCUTANEOUS APPROACH: ICD-10-PCS | Performed by: INTERNAL MEDICINE

## 2020-01-01 PROCEDURE — 36415 COLL VENOUS BLD VENIPUNCTURE: CPT | Performed by: FAMILY MEDICINE

## 2020-01-01 PROCEDURE — 80076 HEPATIC FUNCTION PANEL: CPT | Performed by: INTERNAL MEDICINE

## 2020-01-01 PROCEDURE — 33208 INSRT HEART PM ATRIAL & VENT: CPT | Performed by: INTERNAL MEDICINE

## 2020-01-01 PROCEDURE — 99309 SBSQ NF CARE MODERATE MDM 30: CPT | Performed by: FAMILY MEDICINE

## 2020-01-01 PROCEDURE — C1894 INTRO/SHEATH, NON-LASER: HCPCS | Performed by: INTERNAL MEDICINE

## 2020-01-01 PROCEDURE — 87040 BLOOD CULTURE FOR BACTERIA: CPT | Performed by: FAMILY MEDICINE

## 2020-01-01 PROCEDURE — 99233 SBSQ HOSP IP/OBS HIGH 50: CPT | Performed by: HOSPITALIST

## 2020-01-01 PROCEDURE — S0260 H&P FOR SURGERY: HCPCS | Performed by: INTERNAL MEDICINE

## 2020-01-01 PROCEDURE — 92610 EVALUATE SWALLOWING FUNCTION: CPT

## 2020-01-01 DEVICE — LD QUARTET CRT VENT LNG SPACING 75CM: Type: IMPLANTABLE DEVICE | Status: FUNCTIONAL

## 2020-01-01 DEVICE — IMPLANTABLE DEVICE: Type: IMPLANTABLE DEVICE | Status: FUNCTIONAL

## 2020-01-01 DEVICE — LD PM TENDRIL STS 6F52CM 2088TC52: Type: IMPLANTABLE DEVICE | Status: FUNCTIONAL

## 2020-01-01 RX ORDER — LIDOCAINE HYDROCHLORIDE AND EPINEPHRINE 10; 10 MG/ML; UG/ML
INJECTION, SOLUTION INFILTRATION; PERINEURAL AS NEEDED
Status: DISCONTINUED | OUTPATIENT
Start: 2020-01-01 | End: 2020-01-01 | Stop reason: HOSPADM

## 2020-01-01 RX ORDER — DOXYCYCLINE 100 MG/1
100 CAPSULE ORAL EVERY 12 HOURS SCHEDULED
Status: DISCONTINUED | OUTPATIENT
Start: 2020-01-01 | End: 2020-01-01 | Stop reason: HOSPADM

## 2020-01-01 RX ORDER — AMOXICILLIN 250 MG
2 CAPSULE ORAL NIGHTLY
Start: 2020-01-01

## 2020-01-01 RX ORDER — SPIRONOLACTONE 25 MG/1
12.5 TABLET ORAL DAILY
Start: 2020-01-01

## 2020-01-01 RX ORDER — METOPROLOL TARTRATE 50 MG/1
50 TABLET, FILM COATED ORAL EVERY 12 HOURS SCHEDULED
Status: DISCONTINUED | OUTPATIENT
Start: 2020-01-01 | End: 2020-01-01

## 2020-01-01 RX ORDER — ACETAMINOPHEN 650 MG/1
650 SUPPOSITORY RECTAL EVERY 4 HOURS PRN
Status: DISCONTINUED | OUTPATIENT
Start: 2020-01-01 | End: 2020-01-01 | Stop reason: HOSPADM

## 2020-01-01 RX ORDER — POTASSIUM CHLORIDE 750 MG/1
40 CAPSULE, EXTENDED RELEASE ORAL AS NEEDED
Status: DISCONTINUED | OUTPATIENT
Start: 2020-01-01 | End: 2020-01-01 | Stop reason: HOSPADM

## 2020-01-01 RX ORDER — BUPIVACAINE HYDROCHLORIDE 5 MG/ML
INJECTION, SOLUTION PERINEURAL AS NEEDED
Status: DISCONTINUED | OUTPATIENT
Start: 2020-01-01 | End: 2020-01-01 | Stop reason: HOSPADM

## 2020-01-01 RX ORDER — CEFAZOLIN SODIUM 2 G/100ML
2 INJECTION, SOLUTION INTRAVENOUS EVERY 8 HOURS
Status: COMPLETED | OUTPATIENT
Start: 2020-01-01 | End: 2020-01-01

## 2020-01-01 RX ORDER — SODIUM CHLORIDE 9 MG/ML
INJECTION, SOLUTION INTRAVENOUS CONTINUOUS PRN
Status: COMPLETED | OUTPATIENT
Start: 2020-01-01 | End: 2020-01-01

## 2020-01-01 RX ORDER — ATORVASTATIN CALCIUM 10 MG/1
10 TABLET, FILM COATED ORAL NIGHTLY
Status: DISCONTINUED | OUTPATIENT
Start: 2020-01-01 | End: 2020-01-01 | Stop reason: HOSPADM

## 2020-01-01 RX ORDER — DIPHENHYDRAMINE HYDROCHLORIDE, ZINC ACETATE 2; .1 G/100G; G/100G
CREAM TOPICAL 3 TIMES DAILY PRN
Start: 2020-01-01

## 2020-01-01 RX ORDER — CEFDINIR 300 MG/1
300 CAPSULE ORAL EVERY 12 HOURS SCHEDULED
Status: DISCONTINUED | OUTPATIENT
Start: 2020-01-01 | End: 2020-01-01

## 2020-01-01 RX ORDER — IPRATROPIUM BROMIDE AND ALBUTEROL SULFATE 2.5; .5 MG/3ML; MG/3ML
3 SOLUTION RESPIRATORY (INHALATION)
Status: DISCONTINUED | OUTPATIENT
Start: 2020-01-01 | End: 2020-01-01

## 2020-01-01 RX ORDER — TEMAZEPAM 15 MG/1
15 CAPSULE ORAL NIGHTLY PRN
Status: ACTIVE | OUTPATIENT
Start: 2020-01-01 | End: 2020-01-01

## 2020-01-01 RX ORDER — SPIRONOLACTONE 25 MG/1
12.5 TABLET ORAL DAILY
Status: DISCONTINUED | OUTPATIENT
Start: 2020-01-01 | End: 2020-01-01 | Stop reason: HOSPADM

## 2020-01-01 RX ORDER — ONDANSETRON 2 MG/ML
4 INJECTION INTRAMUSCULAR; INTRAVENOUS EVERY 6 HOURS PRN
Status: DISCONTINUED | OUTPATIENT
Start: 2020-01-01 | End: 2020-01-01 | Stop reason: HOSPADM

## 2020-01-01 RX ORDER — FUROSEMIDE 10 MG/ML
20 INJECTION INTRAMUSCULAR; INTRAVENOUS ONCE
Status: COMPLETED | OUTPATIENT
Start: 2020-01-01 | End: 2020-01-01

## 2020-01-01 RX ORDER — IPRATROPIUM BROMIDE AND ALBUTEROL SULFATE 2.5; .5 MG/3ML; MG/3ML
3 SOLUTION RESPIRATORY (INHALATION) EVERY 6 HOURS PRN
Qty: 360 ML
Start: 2020-01-01

## 2020-01-01 RX ORDER — FAMOTIDINE 20 MG/1
20 TABLET, FILM COATED ORAL
Status: DISCONTINUED | OUTPATIENT
Start: 2020-01-01 | End: 2020-01-01 | Stop reason: HOSPADM

## 2020-01-01 RX ORDER — ACETAMINOPHEN 325 MG/1
650 TABLET ORAL EVERY 4 HOURS PRN
Start: 2020-01-01

## 2020-01-01 RX ORDER — POTASSIUM CHLORIDE 1.5 G/1.77G
40 POWDER, FOR SOLUTION ORAL AS NEEDED
Status: DISCONTINUED | OUTPATIENT
Start: 2020-01-01 | End: 2020-01-01 | Stop reason: HOSPADM

## 2020-01-01 RX ORDER — CEFAZOLIN SODIUM 2 G/100ML
2 INJECTION, SOLUTION INTRAVENOUS ONCE
Status: COMPLETED | OUTPATIENT
Start: 2020-01-01 | End: 2020-01-01

## 2020-01-01 RX ORDER — DOPAMINE HYDROCHLORIDE 160 MG/100ML
1.5 INJECTION, SOLUTION INTRAVENOUS
Status: DISCONTINUED | OUTPATIENT
Start: 2020-01-01 | End: 2020-01-01

## 2020-01-01 RX ORDER — BISACODYL 10 MG
10 SUPPOSITORY, RECTAL RECTAL DAILY PRN
Status: DISCONTINUED | OUTPATIENT
Start: 2020-01-01 | End: 2020-01-01 | Stop reason: HOSPADM

## 2020-01-01 RX ORDER — DIGOXIN 0.25 MG/ML
INJECTION INTRAMUSCULAR; INTRAVENOUS AS NEEDED
Status: DISCONTINUED | OUTPATIENT
Start: 2020-01-01 | End: 2020-01-01 | Stop reason: HOSPADM

## 2020-01-01 RX ORDER — DIPHENHYDRAMINE HYDROCHLORIDE, ZINC ACETATE 2; .1 G/100G; G/100G
CREAM TOPICAL 3 TIMES DAILY PRN
Status: DISCONTINUED | OUTPATIENT
Start: 2020-01-01 | End: 2020-01-01 | Stop reason: HOSPADM

## 2020-01-01 RX ORDER — ASPIRIN 81 MG/1
81 TABLET ORAL DAILY
Status: DISCONTINUED | OUTPATIENT
Start: 2020-01-01 | End: 2020-01-01 | Stop reason: HOSPADM

## 2020-01-01 RX ORDER — MAGNESIUM SULFATE HEPTAHYDRATE 40 MG/ML
2 INJECTION, SOLUTION INTRAVENOUS AS NEEDED
Status: DISCONTINUED | OUTPATIENT
Start: 2020-01-01 | End: 2020-01-01 | Stop reason: HOSPADM

## 2020-01-01 RX ORDER — LIDOCAINE HYDROCHLORIDE 10 MG/ML
2.1 INJECTION, SOLUTION EPIDURAL; INFILTRATION; INTRACAUDAL; PERINEURAL ONCE
Status: COMPLETED | OUTPATIENT
Start: 2020-01-01 | End: 2020-01-01

## 2020-01-01 RX ORDER — HYDROCODONE BITARTRATE AND ACETAMINOPHEN 5; 325 MG/1; MG/1
1 TABLET ORAL EVERY 6 HOURS PRN
Qty: 30 TABLET | Refills: 0 | Status: SHIPPED | OUTPATIENT
Start: 2020-01-01

## 2020-01-01 RX ORDER — MAGNESIUM SULFATE HEPTAHYDRATE 40 MG/ML
4 INJECTION, SOLUTION INTRAVENOUS AS NEEDED
Status: DISCONTINUED | OUTPATIENT
Start: 2020-01-01 | End: 2020-01-01 | Stop reason: HOSPADM

## 2020-01-01 RX ORDER — PHENYLEPHRINE HCL IN 0.9% NACL 0.5 MG/5ML
SYRINGE (ML) INTRAVENOUS AS NEEDED
Status: DISCONTINUED | OUTPATIENT
Start: 2020-01-01 | End: 2020-01-01 | Stop reason: HOSPADM

## 2020-01-01 RX ORDER — BISACODYL 5 MG/1
10 TABLET, DELAYED RELEASE ORAL ONCE
Status: COMPLETED | OUTPATIENT
Start: 2020-01-01 | End: 2020-01-01

## 2020-01-01 RX ORDER — FAMOTIDINE 20 MG/1
20 TABLET, FILM COATED ORAL
Start: 2020-01-01

## 2020-01-01 RX ORDER — PANTOPRAZOLE SODIUM 40 MG/1
40 TABLET, DELAYED RELEASE ORAL DAILY
Status: DISCONTINUED | OUTPATIENT
Start: 2020-01-01 | End: 2020-01-01

## 2020-01-01 RX ORDER — METOPROLOL TARTRATE 100 MG/1
TABLET ORAL
Qty: 180 TABLET | Refills: 3 | COMMUNITY
Start: 2020-01-01 | End: 2020-01-01 | Stop reason: HOSPADM

## 2020-01-01 RX ORDER — VANCOMYCIN HYDROCHLORIDE 1 G/200ML
1000 INJECTION, SOLUTION INTRAVENOUS EVERY 24 HOURS
Status: DISCONTINUED | OUTPATIENT
Start: 2020-01-01 | End: 2020-01-01

## 2020-01-01 RX ORDER — AMOXICILLIN 250 MG
2 CAPSULE ORAL NIGHTLY
Status: DISCONTINUED | OUTPATIENT
Start: 2020-01-01 | End: 2020-01-01 | Stop reason: HOSPADM

## 2020-01-01 RX ORDER — ACETAMINOPHEN 325 MG/1
650 TABLET ORAL EVERY 4 HOURS PRN
Status: DISCONTINUED | OUTPATIENT
Start: 2020-01-01 | End: 2020-01-01 | Stop reason: HOSPADM

## 2020-01-01 RX ORDER — MIDAZOLAM HYDROCHLORIDE 1 MG/ML
INJECTION INTRAMUSCULAR; INTRAVENOUS AS NEEDED
Status: DISCONTINUED | OUTPATIENT
Start: 2020-01-01 | End: 2020-01-01 | Stop reason: HOSPADM

## 2020-01-01 RX ORDER — CEFDINIR 300 MG/1
300 CAPSULE ORAL 2 TIMES DAILY
Qty: 14 CAPSULE | Refills: 0 | Status: SHIPPED | OUTPATIENT
Start: 2020-01-01

## 2020-01-01 RX ORDER — POTASSIUM CHLORIDE 7.45 MG/ML
10 INJECTION INTRAVENOUS
Status: DISCONTINUED | OUTPATIENT
Start: 2020-01-01 | End: 2020-01-01 | Stop reason: HOSPADM

## 2020-01-01 RX ORDER — DOXYCYCLINE 100 MG/1
100 CAPSULE ORAL EVERY 12 HOURS SCHEDULED
Qty: 12 CAPSULE | Refills: 0 | Status: SHIPPED | OUTPATIENT
Start: 2020-01-01 | End: 2020-01-01

## 2020-01-01 RX ORDER — SODIUM CHLORIDE 0.9 % (FLUSH) 0.9 %
10 SYRINGE (ML) INJECTION AS NEEDED
Status: DISCONTINUED | OUTPATIENT
Start: 2020-01-01 | End: 2020-01-01 | Stop reason: HOSPADM

## 2020-01-01 RX ORDER — CEFTRIAXONE 1 G/1
1000 INJECTION, POWDER, FOR SOLUTION INTRAMUSCULAR; INTRAVENOUS ONCE
Status: COMPLETED | OUTPATIENT
Start: 2020-01-01 | End: 2020-01-01

## 2020-01-01 RX ORDER — CEPHALEXIN 250 MG/1
500 CAPSULE ORAL EVERY 8 HOURS SCHEDULED
Status: DISCONTINUED | OUTPATIENT
Start: 2020-01-01 | End: 2020-01-01

## 2020-01-01 RX ORDER — SODIUM CHLORIDE 0.9 % (FLUSH) 0.9 %
3 SYRINGE (ML) INJECTION EVERY 12 HOURS SCHEDULED
Status: DISCONTINUED | OUTPATIENT
Start: 2020-01-01 | End: 2020-01-01 | Stop reason: HOSPADM

## 2020-01-01 RX ORDER — IPRATROPIUM BROMIDE AND ALBUTEROL SULFATE 2.5; .5 MG/3ML; MG/3ML
3 SOLUTION RESPIRATORY (INHALATION) EVERY 4 HOURS PRN
Status: DISCONTINUED | OUTPATIENT
Start: 2020-01-01 | End: 2020-01-01 | Stop reason: HOSPADM

## 2020-01-01 RX ORDER — OXYCODONE HYDROCHLORIDE AND ACETAMINOPHEN 5; 325 MG/1; MG/1
1 TABLET ORAL EVERY 4 HOURS PRN
Status: DISPENSED | OUTPATIENT
Start: 2020-01-01 | End: 2020-01-01

## 2020-01-01 RX ADMIN — ASPIRIN 81 MG: 81 TABLET, COATED ORAL at 21:19

## 2020-01-01 RX ADMIN — SODIUM CHLORIDE, PRESERVATIVE FREE 3 ML: 5 INJECTION INTRAVENOUS at 20:01

## 2020-01-01 RX ADMIN — ASPIRIN 81 MG: 81 TABLET, COATED ORAL at 22:11

## 2020-01-01 RX ADMIN — ATORVASTATIN CALCIUM 10 MG: 10 TABLET, FILM COATED ORAL at 22:05

## 2020-01-01 RX ADMIN — POLYETHYLENE GLYCOL 3350 17 G: 17 POWDER, FOR SOLUTION ORAL at 08:44

## 2020-01-01 RX ADMIN — DOXYCYCLINE 100 MG: 100 INJECTION, POWDER, LYOPHILIZED, FOR SOLUTION INTRAVENOUS at 06:01

## 2020-01-01 RX ADMIN — IPRATROPIUM BROMIDE AND ALBUTEROL SULFATE 3 ML: 2.5; .5 SOLUTION RESPIRATORY (INHALATION) at 08:19

## 2020-01-01 RX ADMIN — ASPIRIN 81 MG: 81 TABLET, COATED ORAL at 20:00

## 2020-01-01 RX ADMIN — SODIUM CHLORIDE, PRESERVATIVE FREE 3 ML: 5 INJECTION INTRAVENOUS at 22:12

## 2020-01-01 RX ADMIN — SENNOSIDES AND DOCUSATE SODIUM 2 TABLET: 8.6; 5 TABLET ORAL at 20:52

## 2020-01-01 RX ADMIN — ASPIRIN 81 MG: 81 TABLET, COATED ORAL at 21:20

## 2020-01-01 RX ADMIN — METOPROLOL TARTRATE 50 MG: 50 TABLET, FILM COATED ORAL at 21:08

## 2020-01-01 RX ADMIN — IPRATROPIUM BROMIDE AND ALBUTEROL SULFATE 3 ML: 2.5; .5 SOLUTION RESPIRATORY (INHALATION) at 20:21

## 2020-01-01 RX ADMIN — HYDROCORTISONE SODIUM SUCCINATE 100 MG: 100 INJECTION, POWDER, FOR SOLUTION INTRAMUSCULAR; INTRAVENOUS at 20:28

## 2020-01-01 RX ADMIN — DAPTOMYCIN 400 MG: 500 INJECTION, POWDER, LYOPHILIZED, FOR SOLUTION INTRAVENOUS at 16:53

## 2020-01-01 RX ADMIN — CEFEPIME HYDROCHLORIDE 2 G: 2 INJECTION, POWDER, FOR SOLUTION INTRAVENOUS at 20:56

## 2020-01-01 RX ADMIN — ATORVASTATIN CALCIUM 10 MG: 10 TABLET, FILM COATED ORAL at 21:20

## 2020-01-01 RX ADMIN — ASPIRIN 81 MG: 81 TABLET, COATED ORAL at 21:07

## 2020-01-01 RX ADMIN — SPIRONOLACTONE 12.5 MG: 25 TABLET ORAL at 09:21

## 2020-01-01 RX ADMIN — FAMOTIDINE 20 MG: 20 TABLET ORAL at 07:04

## 2020-01-01 RX ADMIN — SODIUM CHLORIDE, PRESERVATIVE FREE 3 ML: 5 INJECTION INTRAVENOUS at 08:23

## 2020-01-01 RX ADMIN — SPIRONOLACTONE 12.5 MG: 25 TABLET ORAL at 08:37

## 2020-01-01 RX ADMIN — POLYETHYLENE GLYCOL 3350 17 G: 17 POWDER, FOR SOLUTION ORAL at 09:21

## 2020-01-01 RX ADMIN — MAGNESIUM SULFATE 4 G: 4 INJECTION INTRAVENOUS at 11:01

## 2020-01-01 RX ADMIN — PANTOPRAZOLE SODIUM 40 MG: 40 TABLET, DELAYED RELEASE ORAL at 21:08

## 2020-01-01 RX ADMIN — MEROPENEM 1 G: 1 INJECTION, POWDER, FOR SOLUTION INTRAVENOUS at 14:15

## 2020-01-01 RX ADMIN — MEROPENEM 1 G: 1 INJECTION, POWDER, FOR SOLUTION INTRAVENOUS at 17:31

## 2020-01-01 RX ADMIN — SODIUM CHLORIDE, PRESERVATIVE FREE 10 ML: 5 INJECTION INTRAVENOUS at 08:47

## 2020-01-01 RX ADMIN — SODIUM CHLORIDE, PRESERVATIVE FREE 3 ML: 5 INJECTION INTRAVENOUS at 09:21

## 2020-01-01 RX ADMIN — BISACODYL 10 MG: 5 TABLET, COATED ORAL at 15:50

## 2020-01-01 RX ADMIN — SODIUM CHLORIDE, PRESERVATIVE FREE 3 ML: 5 INJECTION INTRAVENOUS at 22:11

## 2020-01-01 RX ADMIN — PANTOPRAZOLE SODIUM 40 MG: 40 TABLET, DELAYED RELEASE ORAL at 20:51

## 2020-01-01 RX ADMIN — DIPHENHYDRAMINE HYDROCHLORIDE, ZINC ACETATE: 2; .1 CREAM TOPICAL at 11:44

## 2020-01-01 RX ADMIN — DAPTOMYCIN 400 MG: 500 INJECTION, POWDER, LYOPHILIZED, FOR SOLUTION INTRAVENOUS at 13:50

## 2020-01-01 RX ADMIN — ASPIRIN 81 MG: 81 TABLET, COATED ORAL at 20:52

## 2020-01-01 RX ADMIN — POLYETHYLENE GLYCOL 3350 17 G: 17 POWDER, FOR SOLUTION ORAL at 08:11

## 2020-01-01 RX ADMIN — ATORVASTATIN CALCIUM 10 MG: 10 TABLET, FILM COATED ORAL at 22:12

## 2020-01-01 RX ADMIN — IPRATROPIUM BROMIDE AND ALBUTEROL SULFATE 3 ML: 2.5; .5 SOLUTION RESPIRATORY (INHALATION) at 19:50

## 2020-01-01 RX ADMIN — POLYETHYLENE GLYCOL 3350 17 G: 17 POWDER, FOR SOLUTION ORAL at 09:13

## 2020-01-01 RX ADMIN — MEROPENEM 1 G: 1 INJECTION, POWDER, FOR SOLUTION INTRAVENOUS at 04:27

## 2020-01-01 RX ADMIN — SPIRONOLACTONE 12.5 MG: 25 TABLET ORAL at 08:10

## 2020-01-01 RX ADMIN — CEFDINIR 300 MG: 300 CAPSULE ORAL at 08:37

## 2020-01-01 RX ADMIN — IPRATROPIUM BROMIDE AND ALBUTEROL SULFATE 3 ML: 2.5; .5 SOLUTION RESPIRATORY (INHALATION) at 08:33

## 2020-01-01 RX ADMIN — DOXYCYCLINE 100 MG: 100 INJECTION, POWDER, LYOPHILIZED, FOR SOLUTION INTRAVENOUS at 17:15

## 2020-01-01 RX ADMIN — DOXYCYCLINE 100 MG: 100 CAPSULE ORAL at 09:21

## 2020-01-01 RX ADMIN — BARIUM SULFATE 20 ML: 400 PASTE ORAL at 11:55

## 2020-01-01 RX ADMIN — POLYETHYLENE GLYCOL 3350 17 G: 17 POWDER, FOR SOLUTION ORAL at 08:23

## 2020-01-01 RX ADMIN — POLYETHYLENE GLYCOL 3350 17 G: 17 POWDER, FOR SOLUTION ORAL at 08:37

## 2020-01-01 RX ADMIN — SENNOSIDES AND DOCUSATE SODIUM 2 TABLET: 8.6; 5 TABLET ORAL at 21:28

## 2020-01-01 RX ADMIN — ASPIRIN 81 MG: 81 TABLET, COATED ORAL at 20:28

## 2020-01-01 RX ADMIN — FAMOTIDINE 20 MG: 20 TABLET ORAL at 09:13

## 2020-01-01 RX ADMIN — FAMOTIDINE 20 MG: 20 TABLET ORAL at 06:49

## 2020-01-01 RX ADMIN — MEROPENEM 1 G: 1 INJECTION, POWDER, FOR SOLUTION INTRAVENOUS at 11:23

## 2020-01-01 RX ADMIN — POTASSIUM CHLORIDE 10 MEQ: 7.46 INJECTION, SOLUTION INTRAVENOUS at 13:15

## 2020-01-01 RX ADMIN — SENNOSIDES AND DOCUSATE SODIUM 2 TABLET: 8.6; 5 TABLET ORAL at 21:20

## 2020-01-01 RX ADMIN — CEFEPIME 2 G: 2 INJECTION, POWDER, FOR SOLUTION INTRAVENOUS at 15:50

## 2020-01-01 RX ADMIN — DOPAMINE HYDROCHLORIDE 3 MCG/KG/MIN: 160 INJECTION, SOLUTION INTRAVENOUS at 08:23

## 2020-01-01 RX ADMIN — FAMOTIDINE 20 MG: 20 TABLET ORAL at 16:56

## 2020-01-01 RX ADMIN — ATORVASTATIN CALCIUM 10 MG: 10 TABLET, FILM COATED ORAL at 22:11

## 2020-01-01 RX ADMIN — HYDROCORTISONE SODIUM SUCCINATE 100 MG: 100 INJECTION, POWDER, FOR SOLUTION INTRAMUSCULAR; INTRAVENOUS at 06:11

## 2020-01-01 RX ADMIN — IPRATROPIUM BROMIDE AND ALBUTEROL SULFATE 3 ML: 2.5; .5 SOLUTION RESPIRATORY (INHALATION) at 07:57

## 2020-01-01 RX ADMIN — DOXYCYCLINE 100 MG: 100 INJECTION, POWDER, LYOPHILIZED, FOR SOLUTION INTRAVENOUS at 06:00

## 2020-01-01 RX ADMIN — ATORVASTATIN CALCIUM 10 MG: 10 TABLET, FILM COATED ORAL at 20:51

## 2020-01-01 RX ADMIN — IPRATROPIUM BROMIDE AND ALBUTEROL SULFATE 3 ML: 2.5; .5 SOLUTION RESPIRATORY (INHALATION) at 16:54

## 2020-01-01 RX ADMIN — CEFAZOLIN SODIUM 2 G: 2 INJECTION, SOLUTION INTRAVENOUS at 21:08

## 2020-01-01 RX ADMIN — PANTOPRAZOLE SODIUM 40 MG: 40 TABLET, DELAYED RELEASE ORAL at 20:28

## 2020-01-01 RX ADMIN — ATORVASTATIN CALCIUM 10 MG: 10 TABLET, FILM COATED ORAL at 21:08

## 2020-01-01 RX ADMIN — CEFEPIME HYDROCHLORIDE 2 G: 2 INJECTION, POWDER, FOR SOLUTION INTRAVENOUS at 09:21

## 2020-01-01 RX ADMIN — CEFTRIAXONE SODIUM 1000 MG: 1 INJECTION, POWDER, FOR SOLUTION INTRAMUSCULAR; INTRAVENOUS at 02:36

## 2020-01-01 RX ADMIN — POTASSIUM CHLORIDE 10 MEQ: 7.46 INJECTION, SOLUTION INTRAVENOUS at 14:18

## 2020-01-01 RX ADMIN — PANTOPRAZOLE SODIUM 40 MG: 40 TABLET, DELAYED RELEASE ORAL at 21:20

## 2020-01-01 RX ADMIN — SPIRONOLACTONE 12.5 MG: 25 TABLET ORAL at 08:43

## 2020-01-01 RX ADMIN — CEFDINIR 300 MG: 300 CAPSULE ORAL at 21:20

## 2020-01-01 RX ADMIN — SODIUM CHLORIDE, PRESERVATIVE FREE 3 ML: 5 INJECTION INTRAVENOUS at 09:13

## 2020-01-01 RX ADMIN — Medication 5 ML: at 11:44

## 2020-01-01 RX ADMIN — DAPTOMYCIN 400 MG: 500 INJECTION, POWDER, LYOPHILIZED, FOR SOLUTION INTRAVENOUS at 10:14

## 2020-01-01 RX ADMIN — FUROSEMIDE 20 MG: 10 INJECTION, SOLUTION INTRAMUSCULAR; INTRAVENOUS at 12:48

## 2020-01-01 RX ADMIN — ASPIRIN 81 MG: 81 TABLET, COATED ORAL at 22:05

## 2020-01-01 RX ADMIN — SPIRONOLACTONE 12.5 MG: 25 TABLET ORAL at 09:14

## 2020-01-01 RX ADMIN — POLYETHYLENE GLYCOL 3350 17 G: 17 POWDER, FOR SOLUTION ORAL at 08:57

## 2020-01-01 RX ADMIN — MEROPENEM 1 G: 1 INJECTION, POWDER, FOR SOLUTION INTRAVENOUS at 17:00

## 2020-01-01 RX ADMIN — SENNOSIDES AND DOCUSATE SODIUM 2 TABLET: 8.6; 5 TABLET ORAL at 22:05

## 2020-01-01 RX ADMIN — CEFAZOLIN SODIUM 2 G: 2 INJECTION, SOLUTION INTRAVENOUS at 05:09

## 2020-01-01 RX ADMIN — SODIUM CHLORIDE, PRESERVATIVE FREE 3 ML: 5 INJECTION INTRAVENOUS at 21:21

## 2020-01-01 RX ADMIN — FAMOTIDINE 20 MG: 20 TABLET ORAL at 16:59

## 2020-01-01 RX ADMIN — DAPTOMYCIN 400 MG: 500 INJECTION, POWDER, LYOPHILIZED, FOR SOLUTION INTRAVENOUS at 12:12

## 2020-01-01 RX ADMIN — CEFAZOLIN SODIUM 2 G: 2 INJECTION, SOLUTION INTRAVENOUS at 12:50

## 2020-01-01 RX ADMIN — MEROPENEM 1 G: 1 INJECTION, POWDER, FOR SOLUTION INTRAVENOUS at 20:28

## 2020-01-01 RX ADMIN — ATORVASTATIN CALCIUM 10 MG: 10 TABLET, FILM COATED ORAL at 21:18

## 2020-01-01 RX ADMIN — FUROSEMIDE 20 MG: 10 INJECTION, SOLUTION INTRAMUSCULAR; INTRAVENOUS at 09:21

## 2020-01-01 RX ADMIN — CEFEPIME HYDROCHLORIDE 2 G: 2 INJECTION, POWDER, FOR SOLUTION INTRAVENOUS at 09:28

## 2020-01-01 RX ADMIN — FAMOTIDINE 20 MG: 20 TABLET ORAL at 17:31

## 2020-01-01 RX ADMIN — VANCOMYCIN HYDROCHLORIDE 1500 MG: 10 INJECTION, POWDER, LYOPHILIZED, FOR SOLUTION INTRAVENOUS at 11:24

## 2020-01-01 RX ADMIN — HYDROCORTISONE SODIUM SUCCINATE 100 MG: 100 INJECTION, POWDER, FOR SOLUTION INTRAMUSCULAR; INTRAVENOUS at 00:15

## 2020-01-01 RX ADMIN — DOXYCYCLINE 100 MG: 100 INJECTION, POWDER, LYOPHILIZED, FOR SOLUTION INTRAVENOUS at 18:32

## 2020-01-01 RX ADMIN — SODIUM CHLORIDE, PRESERVATIVE FREE 3 ML: 5 INJECTION INTRAVENOUS at 22:06

## 2020-01-01 RX ADMIN — BARIUM SULFATE 50 ML: 400 SUSPENSION ORAL at 11:55

## 2020-01-01 RX ADMIN — HYDROCORTISONE SODIUM SUCCINATE 100 MG: 100 INJECTION, POWDER, FOR SOLUTION INTRAMUSCULAR; INTRAVENOUS at 12:30

## 2020-01-01 RX ADMIN — SENNOSIDES AND DOCUSATE SODIUM 2 TABLET: 8.6; 5 TABLET ORAL at 22:11

## 2020-01-01 RX ADMIN — CEPHALEXIN 500 MG: 250 CAPSULE ORAL at 08:23

## 2020-01-01 RX ADMIN — DOXYCYCLINE 100 MG: 100 CAPSULE ORAL at 22:05

## 2020-01-01 RX ADMIN — DOXYCYCLINE 100 MG: 100 INJECTION, POWDER, LYOPHILIZED, FOR SOLUTION INTRAVENOUS at 05:51

## 2020-01-01 RX ADMIN — SPIRONOLACTONE 12.5 MG: 25 TABLET ORAL at 08:44

## 2020-01-01 RX ADMIN — IPRATROPIUM BROMIDE AND ALBUTEROL SULFATE 3 ML: 2.5; .5 SOLUTION RESPIRATORY (INHALATION) at 17:29

## 2020-01-01 RX ADMIN — MEROPENEM 1 G: 1 INJECTION, POWDER, FOR SOLUTION INTRAVENOUS at 05:18

## 2020-01-01 RX ADMIN — PANTOPRAZOLE SODIUM 40 MG: 40 TABLET, DELAYED RELEASE ORAL at 22:12

## 2020-01-01 RX ADMIN — ATORVASTATIN CALCIUM 10 MG: 10 TABLET, FILM COATED ORAL at 21:28

## 2020-01-01 RX ADMIN — PANTOPRAZOLE SODIUM 40 MG: 40 TABLET, DELAYED RELEASE ORAL at 21:19

## 2020-01-01 RX ADMIN — BISACODYL 10 MG: 10 SUPPOSITORY RECTAL at 14:37

## 2020-01-01 RX ADMIN — BARIUM SULFATE 100 ML: 0.81 POWDER, FOR SUSPENSION ORAL at 11:54

## 2020-01-01 RX ADMIN — POTASSIUM CHLORIDE 10 MEQ: 7.46 INJECTION, SOLUTION INTRAVENOUS at 11:01

## 2020-01-01 RX ADMIN — POLYETHYLENE GLYCOL 3350 17 G: 17 POWDER, FOR SOLUTION ORAL at 08:36

## 2020-01-01 RX ADMIN — POTASSIUM CHLORIDE 10 MEQ: 7.46 INJECTION, SOLUTION INTRAVENOUS at 12:05

## 2020-01-01 RX ADMIN — CEFEPIME HYDROCHLORIDE 2 G: 2 INJECTION, POWDER, FOR SOLUTION INTRAVENOUS at 09:20

## 2020-01-01 RX ADMIN — DOXYCYCLINE 100 MG: 100 INJECTION, POWDER, LYOPHILIZED, FOR SOLUTION INTRAVENOUS at 17:00

## 2020-01-01 RX ADMIN — PANTOPRAZOLE SODIUM 40 MG: 40 TABLET, DELAYED RELEASE ORAL at 20:01

## 2020-01-01 RX ADMIN — IPRATROPIUM BROMIDE AND ALBUTEROL SULFATE 3 ML: 2.5; .5 SOLUTION RESPIRATORY (INHALATION) at 11:24

## 2020-01-01 RX ADMIN — FAMOTIDINE 20 MG: 20 TABLET ORAL at 17:38

## 2020-01-01 RX ADMIN — DOPAMINE HYDROCHLORIDE 1.5 MCG/KG/MIN: 160 INJECTION, SOLUTION INTRAVENOUS at 19:15

## 2020-01-01 RX ADMIN — ATORVASTATIN CALCIUM 10 MG: 10 TABLET, FILM COATED ORAL at 20:00

## 2020-01-01 RX ADMIN — IPRATROPIUM BROMIDE AND ALBUTEROL SULFATE 3 ML: 2.5; .5 SOLUTION RESPIRATORY (INHALATION) at 19:53

## 2020-01-01 RX ADMIN — IPRATROPIUM BROMIDE AND ALBUTEROL SULFATE 3 ML: 2.5; .5 SOLUTION RESPIRATORY (INHALATION) at 13:29

## 2020-01-01 RX ADMIN — SODIUM CHLORIDE, PRESERVATIVE FREE 3 ML: 5 INJECTION INTRAVENOUS at 09:22

## 2020-01-01 RX ADMIN — ASPIRIN 81 MG: 81 TABLET, COATED ORAL at 21:28

## 2020-01-01 RX ADMIN — LIDOCAINE HYDROCHLORIDE 2.1 ML: 10 INJECTION, SOLUTION EPIDURAL; INFILTRATION; INTRACAUDAL; PERINEURAL at 02:37

## 2020-01-01 RX ADMIN — SENNOSIDES AND DOCUSATE SODIUM 2 TABLET: 8.6; 5 TABLET ORAL at 22:12

## 2020-01-01 RX ADMIN — IPRATROPIUM BROMIDE AND ALBUTEROL SULFATE 3 ML: 2.5; .5 SOLUTION RESPIRATORY (INHALATION) at 12:56

## 2020-01-01 RX ADMIN — POLYETHYLENE GLYCOL 3350 17 G: 17 POWDER, FOR SOLUTION ORAL at 09:23

## 2020-01-01 RX ADMIN — ATORVASTATIN CALCIUM 10 MG: 10 TABLET, FILM COATED ORAL at 20:28

## 2020-01-01 RX ADMIN — SENNOSIDES AND DOCUSATE SODIUM 2 TABLET: 8.6; 5 TABLET ORAL at 20:01

## 2020-01-01 RX ADMIN — FAMOTIDINE 20 MG: 20 TABLET ORAL at 08:10

## 2020-01-01 RX ADMIN — CEFEPIME HYDROCHLORIDE 2 G: 2 INJECTION, POWDER, FOR SOLUTION INTRAVENOUS at 22:15

## 2020-01-03 NOTE — OUTREACH NOTE
CHF Week 4 Survey      Responses   Facility patient discharged from?  [facility]   Does the patient have one of the following disease processes/diagnoses(primary or secondary)?  CHF   Week 4 attempt successful?  Yes   Call start time  0958   Call end time  0959   Discharge diagnosis  Atrial fibrillation, persistent with RVR , SIRS, CHF   Is patient permission given to speak with other caregiver?  Yes   List who call center can speak with  cyn- Saleem   Person spoke with today (if not patient) and relationship  sonAnders Monge   Has the patient kept scheduled appointments due by today?  Yes   What is the patient's perception of their health status since discharge?  Improving   Additional teach back comments  Per son, pt is doing well and is going to see cardiologist today, no questions or concerns at this time.   Week 4 Call Completed?  Yes   Would the patient like one additional call?  No   Graduated  Yes   Did the patient feel the follow up calls were helpful during their recovery period?  Yes   Was the number of calls appropriate?  Yes          Colleen Kruger RN

## 2020-01-03 NOTE — PROGRESS NOTES
Hugo Colon  2/28/1930  PCP: Sincere Jeffries MD    SUBJECTIVE:   Hugo Colon is a 89 y.o. male seen for a follow up visit regarding the following:     Chief Complaint: Follow up for A. Fib, SOB, CHF    HPI:    Since last visit the patient's status has been unstable. Ongoing issues with SOB and tachycardia    History:   The patient is an 89 year old WM with history of HTN, previously not on any medication who was incidentally found to have rapid atrial fibrillation, asymptomatic, in May 2019. He was placed on Eliquis 2.5 mg BID and Toprol XL with goal of rate control. His echocardiogram unfortunately showed EF 35% and moderate to severe AS. He is referred to EP services for further opinion on rate control vs rhythm control of his atrial fibrillation. He is asymptomatic from his atrial fibrillation with no symptoms of palpitations. He does have very mild SOB and fatigue, that has been ongoing for over a year, when [place] walks at the mall. He states he does not feel limited and is able to complete his walk. He denies edema, orthopnea, PND. His BPs have been in the 120/80 range on average. His HRs have been in the 120s at home.        Cardiac PMH: (Old records have been reviewed and summarized below)  1. Persistent Atrial Fibrillation              A)Diagnosis 5/19              B)Toprol started 6/19              C)Asymptomatic              D) CHADSVasc = 3 on Eliquis 2.5 mg BID  2. Cardiomyopathy/VHD              A)Echocardiogram 7/16/19 EF 35% Moderate to Severe AS and MR.   3. VHD              A) Echocardiogram 7/16/19: EF 35%, moderate to severe AS.  4. HTN  5. PVD              A) history right carotid endarterectomy  6. Previous tobacco abuse - quit 1963  7. Surgical History:              A) hemorrhoidectomy              B) hernia repair    Current Outpatient Medications:   •  apixaban (ELIQUIS) 2.5 MG tablet tablet, Take 1 tablet by mouth Every 12 (Twelve) Hours., Disp: 180 tablet, Rfl: 3  •  aspirin 81  MG EC tablet, Take 1 tablet by mouth Daily., Disp: 30 tablet, Rfl: 3  •  atorvastatin (LIPITOR) 10 MG tablet, Take 1 tablet by mouth Every Night., Disp: , Rfl:   •  furosemide (LASIX) 20 MG tablet, Take 1 tablet daily as needed for weight gain 3 lbs or more, Disp: 30 tablet, Rfl: 0  •  metoprolol tartrate (LOPRESSOR) 100 MG tablet, Take 1 tablet by mouth 2 (Two) Times a Day., Disp: 180 tablet, Rfl: 3  •  Multiple Vitamins-Minerals (I-NICO) tablet tablet, Take 1 tablet by mouth Daily., Disp: , Rfl:   •  Multiple Vitamins-Minerals (MULTIVITAMIN ADULT PO), Take 1 tablet by mouth Daily., Disp: , Rfl:   •  multivitamin (DAILY NICO) tablet tablet, Take 1 tablet by mouth Daily., Disp: , Rfl:   •  nitroglycerin (NITROSTAT) 0.4 MG SL tablet, Place 1 tablet under the tongue Every 5 (Five) Minutes As Needed for Chest Pain (Only if SBP Greater Than 100)., Disp: 30 tablet, Rfl: 12  •  pantoprazole (PROTONIX) 40 MG EC tablet, Take 1 tablet by mouth Daily., Disp: 30 tablet, Rfl: 3  •  polyethylene glycol (MIRALAX) pack packet, Take 17 g by mouth Daily., Disp: 578 g, Rfl: 3    Past Medical History, Past Surgical History, Family history, Social History, and Medications were all reviewed with the patient today and updated as necessary.       Patient Active Problem List   Diagnosis   • Dyssomnia   • Persistent atrial fibrillation   • Advanced age   • Other specified hypothyroidism   • Chronic anticoagulation with low-dose Eliquis   • Chronic renal failure, stage 3 (moderate) (CMS/HCC)   • Nonrheumatic aortic valve stenosis   • Atrial fibrillation (CMS/HCC)   • Atrial fibrillation, persistent with RVR      Allergies   Allergen Reactions   • Penicillins Other (See Comments)     Made arms blue   • Levofloxacin Unknown - Low Severity     Past Medical History:   Diagnosis Date   • Atrial fibrillation (CMS/HCC)    • Chronic anticoagulation with low-dose Eliquis 7/1/2019   • Chronic renal failure, stage 3 (moderate) (CMS/HCC) 7/1/2019   •  "Diverticulosis of large intestine 2016   • Dyssomnia 2016   • Hyperlipidemia    • Hypertension    • Nonrheumatic aortic valve stenosis 2019   • Systolic CHF (CMS/HCC)      Past Surgical History:   Procedure Laterality Date   • EXTERNAL EAR SURGERY      cancer   • HEMORRHOIDECTOMY     • HERNIA REPAIR       Family History   Problem Relation Age of Onset   • Hypertension Sister    • Hyperlipidemia Sister    • Hyperlipidemia Brother    • Hypertension Brother      Social History     Tobacco Use   • Smoking status: Former Smoker     Packs/day: 1.00     Years: 17.00     Pack years: 17.00     Types: Cigarettes     Start date:      Last attempt to quit: 1963     Years since quittin.0   • Smokeless tobacco: Never Used   Substance Use Topics   • Alcohol use: No         PHYSICAL EXAM:    /70 (BP Location: Left arm, Patient Position: Sitting)   Pulse 61   Ht 170.2 cm (67\")   Wt 70.3 kg (155 lb)   SpO2 93%   BMI 24.28 kg/m²        Wt Readings from Last 5 Encounters:   20 70.3 kg (155 lb)   19 70.3 kg (155 lb)   19 71.3 kg (157 lb 1.6 oz)   19 67 kg (147 lb 12.8 oz)   19 65.8 kg (145 lb)       BP Readings from Last 5 Encounters:   20 112/70   19 110/78   19 118/84   19 119/78   19 120/78       General-Well Nourished, Well developed  Eyes - PERRLA  Neck- supple, No mass  CV- irregular rate and rhythm, 2/6 murmur, No edema  Lung- clear bilaterally  Abd- soft, +BS  Musc/skel - Norm strength and range of motion  Skin- warm and dry  Neuro - Alert & Oriented x 3, appropriate mood.        Medical problems and test results were reviewed with the patient today.     Recent Results (from the past 672 hour(s))   Basic Metabolic Panel    Collection Time: 19  4:26 AM   Result Value Ref Range    Glucose 84 65 - 99 mg/dL    BUN 52 (H) 8 - 23 mg/dL    Creatinine 1.58 (H) 0.76 - 1.27 mg/dL    Sodium 139 136 - 145 mmol/L    Potassium 4.0 3.5 - 5.2 " mmol/L    Chloride 104 98 - 107 mmol/L    CO2 21.0 (L) 22.0 - 29.0 mmol/L    Calcium 8.8 8.6 - 10.5 mg/dL    eGFR Non African Amer 42 (L) >60 mL/min/1.73    BUN/Creatinine Ratio 32.9 (H) 7.0 - 25.0    Anion Gap 14.0 5.0 - 15.0 mmol/L   Basic Metabolic Panel    Collection Time: 12/19/19  2:01 PM   Result Value Ref Range    Glucose 107 (H) 65 - 99 mg/dL    BUN 63 (H) 8 - 23 mg/dL    Creatinine 1.87 (H) 0.76 - 1.27 mg/dL    Sodium 141 136 - 145 mmol/L    Potassium 5.8 (H) 3.5 - 5.2 mmol/L    Chloride 107 98 - 107 mmol/L    CO2 20.1 (L) 22.0 - 29.0 mmol/L    Calcium 9.3 8.6 - 10.5 mg/dL    eGFR Non African Amer 34 (L) >60 mL/min/1.73    BUN/Creatinine Ratio 33.7 (H) 7.0 - 25.0    Anion Gap 13.9 5.0 - 15.0 mmol/L   Basic Metabolic Panel    Collection Time: 12/23/19  1:52 PM   Result Value Ref Range    Glucose 120 (H) 65 - 99 mg/dL    BUN 42 (H) 8 - 23 mg/dL    Creatinine 1.54 (H) 0.76 - 1.27 mg/dL    Sodium 144 136 - 145 mmol/L    Potassium 5.4 (H) 3.5 - 5.2 mmol/L    Chloride 109 (H) 98 - 107 mmol/L    CO2 20.9 (L) 22.0 - 29.0 mmol/L    Calcium 9.4 8.6 - 10.5 mg/dL    eGFR Non African Amer 43 (L) >60 mL/min/1.73    BUN/Creatinine Ratio 27.3 (H) 7.0 - 25.0    Anion Gap 14.1 5.0 - 15.0 mmol/L         EKG: (EKG has been independently visualized by me and summarized below)    Procedures     ASSESSMENT and PLAN  1.  Atrial fibrillation-permanent in nature-difficult to control rates-plan for a biventricular pacemaker with AV node ablation.  No atrial lead is needed.  We will plan to wean off his metoprolol post AV node ablationWe discussed the procedure in great detail including risks, benefits, and alternatives. The patient understands that risks include bleeding, infection, stroke, MI, cardiac perforation, and even death.  2.  Chronic systolic heart failure-EF 36 to 40%-felt to be made worse by his atrial fibrillation with uncontrolled rates  3.  Moderate aortic stenosis  4.  Pulmonary hypertension pressures about 50  mmHg    Return for after procedure.    1. [employer] DDDR Pacemaker - No atrial lead  2. Hold Eliquis 1 day before    Brandt Arias M.D., F.CHAU.JOHN.C, F.H.R.S.  Cardiology/Electrophysiology  1/3/2020  2:35 PM

## 2020-01-06 NOTE — PROGRESS NOTES
Subjective   Hugo Colon is a 89 y.o. male.     History of Present Illness follow-up regarding hypertension atrial fibrillation.  Medication management.  Has visited with cardiology general as well as electrophysiology.  Has upcoming procedure planned regarding the atrial fibrillation.  Utilizing medications as reconciled including the Lopressor at 100 mg twice daily.  Energy is still not improved.  Denies shortness of breath palpitations chest pain GI .  Here with a family member.  They are now helping with medication.    The following portions of the patient's history were reviewed and updated as appropriate: allergies, past family history, past medical history, past social history, past surgical history and problem list.    Review of Systems  See history of Present Illness     Objective     Physical Exam   Constitutional: He is oriented to person, place, and time. He appears well-developed and well-nourished.   HENT:   Head: Normocephalic.   Mouth/Throat: Oropharynx is clear and moist.   Eyes: Conjunctivae and EOM are normal.   Neck: Normal range of motion. Neck supple. No tracheal deviation present. No thyromegaly present.   Cardiovascular: Normal rate and normal heart sounds.   No murmur heard.  Irregularly  irregular rhythm   Pulmonary/Chest: Effort normal and breath sounds normal.   Musculoskeletal: He exhibits no edema.   Lymphadenopathy:     He has no cervical adenopathy.   Neurological: He is alert and oriented to person, place, and time.   Skin: Skin is warm and dry.   Psychiatric: He has a normal mood and affect.   Vitals reviewed.      PHQ-9 Total Score:      Patient's Body mass index is 25.86 kg/m². BMI is above normal parameters. Recommendations include: exercise counseling and nutrition counseling.   (Normal BMI:  18.5-24.9, OW 25-29.9, Obesity 30 or greater)      Assessment/Plan     Hugo was seen today for persistent atrial fib.    Diagnoses and all orders for this visit:    Atrial fibrillation,  persistent with RVR   -     Basic Metabolic Panel    Chronic renal failure, stage 3 (moderate) (CMS/HCC)  -     Basic Metabolic Panel    You do manifest some lower extremity edema today along with a resting tachycardia.  Take Lasix today one time only.  Do not resume regular basis.  Increase metoprolol to a dose of 150 mg a.m. 100 mg p.m. to help with rate.  Keep follow-ups elsewhere.  Check electrolytes.  Recheck here in a couple of months.  Contact us if problems.                   This document has been electronically signed by Sincere Jeffries MD   January 6, 2020 1:26 PM

## 2020-01-13 PROBLEM — I50.22 CHRONIC SYSTOLIC CONGESTIVE HEART FAILURE (HCC): Status: ACTIVE | Noted: 2020-01-01

## 2020-01-21 PROBLEM — R13.10 DYSPHAGIA: Chronic | Status: ACTIVE | Noted: 2020-01-01

## 2020-01-21 PROBLEM — R13.10 DYSPHAGIA: Status: ACTIVE | Noted: 2020-01-01

## 2020-01-21 PROBLEM — N17.9 AKI (ACUTE KIDNEY INJURY) (HCC): Status: ACTIVE | Noted: 2020-01-01

## 2020-01-21 PROBLEM — A41.9 SEPSIS (HCC): Status: ACTIVE | Noted: 2020-01-01

## 2020-01-21 PROBLEM — J96.01 ACUTE RESPIRATORY FAILURE WITH HYPOXIA (HCC): Status: ACTIVE | Noted: 2020-01-01

## 2020-01-21 PROBLEM — I50.22 CHRONIC SYSTOLIC CONGESTIVE HEART FAILURE (HCC): Chronic | Status: ACTIVE | Noted: 2020-01-01

## 2020-01-21 PROBLEM — I50.43 ACUTE ON CHRONIC SYSTOLIC AND DIASTOLIC HEART FAILURE, NYHA CLASS 4 (HCC): Status: ACTIVE | Noted: 2020-01-01

## 2020-01-21 PROBLEM — J69.0 ASPIRATION PNEUMONIA (HCC): Status: ACTIVE | Noted: 2020-01-01

## 2020-02-05 NOTE — ED PROVIDER NOTES
Subjective   The patient is an 89-year-old male presenting from the nursing home for complaint of fall.  The patient denies any pain or complaints.  He is unsure if he hit his head or lost consciousness.  He has moderate swelling to his left arm with erythema.  He denies recent fever or chills.      History provided by:  Patient   used: No    Fall   Mechanism of injury: fall    Injury location:  Shoulder/arm  Shoulder/arm injury location:  L arm  Incident location:  intermediate  Time since incident:  2 hours  Fall:     Impact surface:  Hard floor  Tetanus status:  Unknown  Associated symptoms: no abdominal pain, no back pain, no blindness, no difficulty breathing, no nausea, no neck pain and no seizures    Risk factors: anticoagulation therapy        Review of Systems   Constitutional: Negative.    HENT: Negative.    Eyes: Negative.  Negative for blindness.   Respiratory: Negative.    Gastrointestinal: Negative for abdominal pain and nausea.   Endocrine: Negative.    Genitourinary: Negative.    Musculoskeletal: Negative for back pain and neck pain.   Skin: Positive for color change.   Allergic/Immunologic: Negative.    Neurological: Negative.  Negative for seizures.   Hematological: Bruises/bleeds easily.   Psychiatric/Behavioral: Negative.    All other systems reviewed and are negative.      Past Medical History:   Diagnosis Date   • Atrial fibrillation (CMS/HCC)    • Chronic anticoagulation with low-dose Eliquis 7/1/2019   • Chronic renal failure, stage 3 (moderate) (CMS/HCC) 7/1/2019   • Diverticulosis of large intestine 7/19/2016   • Dyssomnia 7/19/2016   • Hyperlipidemia    • Hypertension    • Nonrheumatic aortic valve stenosis 7/29/2019   • Systolic CHF (CMS/HCC)        Allergies   Allergen Reactions   • Penicillins Other (See Comments)     Made arms blue  Has tolerated cephalosporins   • Levofloxacin Unknown - Low Severity       Past Surgical History:   Procedure Laterality Date   •  CARDIAC ELECTROPHYSIOLOGY PROCEDURE N/A 2020    Procedure: Biventricular [employer] Pacemaker with AV node ablation;  Surgeon: Brandt Arias MD;  Location: [facility] INVASIVE LOCATION;  Service: Cardiology   • EXTERNAL EAR SURGERY      cancer   • HEMORRHOIDECTOMY     • HERNIA REPAIR         Family History   Problem Relation Age of Onset   • Hypertension Sister    • Hyperlipidemia Sister    • Hyperlipidemia Brother    • Hypertension Brother        Social History     Socioeconomic History   • Marital status:      Spouse name: Not on file   • Number of children: Not on file   • Years of education: Not on file   • Highest education level: Not on file   Tobacco Use   • Smoking status: Former Smoker     Packs/day: 1.00     Years: 17.00     Pack years: 17.00     Types: Cigarettes     Start date:      Last attempt to quit:      Years since quittin.1   • Smokeless tobacco: Never Used   Substance and Sexual Activity   • Alcohol use: No   • Drug use: No   • Sexual activity: Defer     Comment: , has step children           Objective   Physical Exam   Constitutional: He is oriented to person, place, and time. He appears well-developed and well-nourished.   HENT:   Head: Normocephalic.   Eyes: Pupils are equal, round, and reactive to light. EOM are normal.   Neck: Normal range of motion. Neck supple.   Cardiovascular: Normal rate, regular rhythm, normal heart sounds and intact distal pulses.   Pulmonary/Chest: Effort normal and breath sounds normal.   Abdominal: Soft. Bowel sounds are normal.   Musculoskeletal: Normal range of motion. He exhibits edema and tenderness.   Non tender, swollen, erythematous left arm   Neurological: He is alert and oriented to person, place, and time.   Skin: Skin is warm. Capillary refill takes less than 2 seconds. There is erythema.   Psychiatric: He has a normal mood and affect. His behavior is normal. Judgment and thought content normal.   Nursing note and vitals  reviewed.      Procedures           ED Course                                               MDM  Number of Diagnoses or Management Options  Cellulitis of left upper arm: new and requires workup     Amount and/or Complexity of Data Reviewed  Clinical lab tests: ordered and reviewed  Tests in the radiology section of CPT®: ordered and reviewed  Tests in the medicine section of CPT®: reviewed and ordered    Risk of Complications, Morbidity, and/or Mortality  Presenting problems: moderate  Diagnostic procedures: moderate  Management options: moderate  General comments: Erythema, swelling left arm treated as cellulitis  Ruled out DVT or fracture  Treated with abx and advised close follow up    Patient Progress  Patient progress: improved      Final diagnoses:   Cellulitis of left upper arm            Reddy Marc, APRN  02/05/20 0410

## 2020-02-05 NOTE — OUTREACH NOTE
Care Coordination Note    Acute activity alert via patient ping; upon chart review, RN-ACM notes patient is a SNF resident, no outreach required.    Joelle Knowles RN  Ambulatory     2/5/2020, 2:14 PM

## 2023-01-01 NOTE — PROGRESS NOTES
Pt breathing easily and unlabored, tolerating PO, no suctioning needed at this time. Mom and dad cartside, updated on plan of care. No further questions or concerns at this time.    subjective     Chief Complaint   Patient presents with   • Atrial Fibrillation   • Follow-up     Pt doing well, has not started on the Metoprolol as of yet     History of Present Illness  Patient is 89 years old white male who has history of atrial fibrillation with rapid ventricular rate.  He was taking metoprolol XL 50 mg daily.  He went to see EP service in [place].  Apparently metoprolol was switched to metoprolol tartrate 100 twice daily.  Patient has not taken any metoprolol at all in the last 2 months he states that he did not take metoprolol because the old metoprolol was discontinued and he could not find the prescription for new metoprolol his drugstore did not have the prescription.  Patient however did not call anybody.    His heart rate is running fast.  He brought a list of his readings from home heart rate has been around 9200 bpm.  Patient is asymptomatic and is taking Eliquis.    Past Surgical History:   Procedure Laterality Date   • EXTERNAL EAR SURGERY      cancer   • HEMORRHOIDECTOMY     • HERNIA REPAIR       Family History   Problem Relation Age of Onset   • Hypertension Sister    • Hyperlipidemia Sister    • Hyperlipidemia Brother    • Hypertension Brother      Past Medical History:   Diagnosis Date   • Hyperlipidemia    • Hypertension      Patient Active Problem List   Diagnosis   • Diverticulosis of large intestine   • Dyssomnia   • Persistent atrial fibrillation (CMS/HCC)   • Advanced age   • Other specified hypothyroidism   • Chronic anticoagulation with low-dose Eliquis   • Chronic renal failure, stage 2 (mild)   • Nonrheumatic aortic valve stenosis       Social History     Tobacco Use   • Smoking status: Former Smoker     Packs/day: 1.00     Years: 17.00     Pack years: 17.00     Types: Cigarettes     Start date:      Last attempt to quit:      Years since quittin.7   • Smokeless tobacco: Never Used   Substance Use Topics   • Alcohol use: No   • Drug use: No       Allergies  "  Allergen Reactions   • Levofloxacin    • Penicillins Other (See Comments)     Made arms blue       Current Outpatient Medications on File Prior to Visit   Medication Sig   • apixaban (ELIQUIS) 2.5 MG tablet tablet Take 1 tablet by mouth Every 12 (Twelve) Hours.   • Multiple Vitamins-Minerals (MULTIVITAMIN ADULT PO) Take  by mouth Daily.   • [DISCONTINUED] metoprolol tartrate (LOPRESSOR) 100 MG tablet Take 1 tablet by mouth 2 (Two) Times a Day.     No current facility-administered medications on file prior to visit.          The following portions of the patient's history were reviewed and updated as appropriate: allergies, current medications, past family history, past medical history, past social history, past surgical history and problem list.    Review of Systems   Constitution: Negative.   HENT: Negative.  Negative for congestion.    Eyes: Negative.    Cardiovascular: Positive for palpitations. Negative for chest pain, cyanosis, dyspnea on exertion, irregular heartbeat, leg swelling, near-syncope, orthopnea, paroxysmal nocturnal dyspnea and syncope.   Respiratory: Negative.  Negative for shortness of breath.    Hematologic/Lymphatic: Negative.    Musculoskeletal: Negative.    Gastrointestinal: Negative.    Neurological: Negative.  Negative for headaches.          Objective:     /74 (BP Location: Left arm, Patient Position: Sitting, Cuff Size: Adult)   Pulse 72   Ht 168.9 cm (66.5\")   Wt 69.4 kg (153 lb)   SpO2 98%   BMI 24.32 kg/m²   Physical Exam   Constitutional: He appears well-developed and well-nourished. No distress.   HENT:   Head: Normocephalic and atraumatic.   Mouth/Throat: Oropharynx is clear and moist. No oropharyngeal exudate.   Eyes: Conjunctivae and EOM are normal. Pupils are equal, round, and reactive to light. No scleral icterus.   Neck: Normal range of motion. Neck supple. No JVD present. No tracheal deviation present. No thyromegaly present.   Cardiovascular: Normal rate, regular " rhythm, normal heart sounds and intact distal pulses. PMI is not displaced. Exam reveals no gallop, no friction rub and no decreased pulses.   No murmur heard.  Pulses:       Carotid pulses are 3+ on the right side, and 3+ on the left side.       Radial pulses are 3+ on the right side, and 3+ on the left side.   Pulmonary/Chest: Effort normal and breath sounds normal. No respiratory distress. He has no wheezes. He has no rales. He exhibits no tenderness.   Abdominal: Soft. Bowel sounds are normal. He exhibits no distension, no abdominal bruit and no mass. There is no splenomegaly or hepatomegaly. There is no tenderness. There is no rebound and no guarding.   Musculoskeletal: Normal range of motion. He exhibits no edema, tenderness or deformity.   Lymphadenopathy:     He has no cervical adenopathy.   Neurological: He is alert. He has normal reflexes. No cranial nerve deficit. He exhibits normal muscle tone. Coordination normal.   Skin: Skin is warm and dry. No rash noted. He is not diaphoretic. No erythema.   Psychiatric: He has a normal mood and affect. His behavior is normal. Judgment and thought content normal.         Lab Review  Lab Results   Component Value Date     05/16/2019    K 4.9 05/16/2019     05/16/2019    BUN 27 (H) 05/16/2019    CREATININE 1.31 (H) 05/16/2019    GLUCOSE 85 05/16/2019    CALCIUM 9.4 05/16/2019    ALT 14 05/16/2019    ALKPHOS 70 05/16/2019    LABIL2 1.7 11/23/2015     No results found for: CKTOTAL  Lab Results   Component Value Date    WBC 8.07 05/16/2019    HGB 14.7 05/16/2019    HCT 46.6 05/16/2019     05/16/2019     No results found for: INR  No results found for: MG  Lab Results   Component Value Date    PSA 1.870 11/16/2018    TSH 4.230 (H) 07/16/2019     No results found for: BNP  Lab Results   Component Value Date    CHLPL 118 11/23/2015    CHOL 135 05/16/2019    TRIG 62 05/16/2019    HDL 40 05/16/2019    VLDL 12.4 05/16/2019    LDLHDL 2.07 05/16/2019     Lab  Results   Component Value Date    LDL 83 05/16/2019    LDL 76 11/16/2018       Procedures       I personally viewed and interpreted the patient's LAB data         Assessment:     1. Nonrheumatic aortic valve stenosis    2. Persistent atrial fibrillation (CMS/HCC)    3. Advanced age    4. Chronic anticoagulation with low-dose Eliquis    5. Chronic renal failure, stage 2 (mild)          Plan:     Patient has not taken any metoprolol in last 2 months.  He was advised to start metoprolol tartrate 50 twice daily.  Metoprolol dose will be titrated for rate control.  Eliquis 2.5 twice daily was continued.  Patient was advised to call us with heart rate reading in about a week.  Follow-up scheduled        No Follow-up on file.

## 2023-08-15 NOTE — TELEPHONE ENCOUNTER
Jt called and left a message. I tried to call her back. No answer. I did not get the option to leave a message.   Today you were seen in the ED for shortness of breath     It was found that you have No signs of medical emergency warranting further evaluation in the emergency department.    A copy of your results attached this document below.    Please follow up with Your primary care provider in regards to today's event.    Please take prednisone once a day and please use albuterol inhaler that you were sent to your pharmacy as needed please take the prednisone once a day for the next 4 days    Asthma is a long-term (chronic) condition that causes recurrent episodes in which the lower airways in the lungs become tight and narrow. The narrowing is caused by inflammation and tightening of the smooth muscle around the lower airways.    Asthma episodes, also called asthma attacks or asthma flares, may cause coughing, making high-pitched whistling sounds when you breathe, most often when you breathe out (wheezing), shortness of breath, and chest pain. The airways may produce extra mucus caused by the inflammation and irritation. During an attack, it can be difficult to breathe. Asthma attacks can range from minor to life-threatening.    Asthma cannot be cured, but medicines and lifestyle changes can help control it and treat acute attacks. It is important to keep your asthma well controlled so the condition does not interfere with your daily life.    What are the causes?  This condition is believed to be caused by inherited (genetic) and environmental factors, but its exact cause is not known.    What can trigger an asthma attack?    Many things can bring on an asthma attack or make symptoms worse. These triggers are different for every person. Common triggers include:  Allergens and irritants like mold, dust, pet dander, cockroaches, pollen, air pollution, and chemical odors.  Cigarette smoke.  Weather changes and cold air.  Stress and strong emotional responses such as crying or laughing hard.  Certain medications such as aspirin or beta blockers.  Infections and inflammatory conditions, such as the flu, a cold, pneumonia, or inflammation of the nasal membranes (rhinitis).  Gastroesophageal reflux disease (GERD).  What are the signs or symptoms?  Symptoms may occur right after exposure to an asthma trigger or hours later and can vary by person. Common signs and symptoms include:  Wheezing.  Trouble breathing (shortness of breath).  Excessive nighttime or early morning coughing.  Chest tightness.  Tiredness (fatigue) with minimal activity.  Difficulty talking in complete sentences.  Poor exercise tolerance.  How is this diagnosed?  This condition is diagnosed based on:  A physical exam and your medical history.  Tests, which may include:  Lung function studies to evaluate the flow of air in your lungs.  Allergy tests.  Imaging tests, such as X-rays.  How is this treated?  There is no cure, but symptoms can be controlled with proper treatment. Treatment usually involves:  Identifying and avoiding your asthma triggers.  Inhaled medicines. Two types are commonly used to treat asthma, depending on severity:  Controller medicines. These help prevent asthma symptoms from occurring. They are taken every day.  Fast-acting reliever or rescue medicines. These quickly relieve asthma symptoms. They are used as needed and provide short-term relief.  Using other medicines, such as:  Allergy medicines, such as antihistamines, if your asthma attacks are triggered by allergens.  Immune medicines (immunomodulators). These are medicines that help control the immune system.  Using supplemental oxygen. This is only needed during a severe episode.  Creating an asthma action plan. An asthma action plan is a written plan for managing and treating your asthma attacks. This plan includes:  A list of your asthma triggers and how to avoid them.  Information about when medicines should be taken and when their dosage should be changed.  Instructions about using a device called a peak flow meter. A peak flow meter measures how well the lungs are working and the severity of your asthma. It helps you monitor your condition.  Follow these instructions at home:  Take over-the-counter and prescription medicines only as told by your health care provider.  Stay up to date on all vaccinations as recommended by your healthcare provider, including vaccines for the flu and pneumonia.  Use a peak flow meter and keep track of your peak flow readings.  Understand and use your asthma action plan to address any asthma flares.  Do not smoke or allow anyone to smoke in your home.  Contact a health care provider if:  You have wheezing, shortness of breath, or a cough that is not responding to medicines.  Your medicines are causing side effects, such as a rash, itching, swelling, or trouble breathing.  You need to use a reliever medicine more than 2–3 times a week.  Your peak flow reading is still at 50–79% of your personal best after following your action plan for 1 hour.  You have a fever and shortness of breath.  Get help right away if:  You are getting worse and do not respond to treatment during an asthma attack.  You are short of breath when at rest or when doing very little physical activity.  You have difficulty eating, drinking, or talking.  You have chest pain or tightness.  You develop a fast heartbeat or palpitations.  You have a bluish color to your lips or fingernails.  You are light-headed or dizzy, or you faint.  Your peak flow reading is less than 50% of your personal best.  You feel too tired to breathe normally.  These symptoms may be an emergency. Get help right away. Call 911.  Do not wait to see if the symptoms will go away.  Do not drive yourself to the hospital.  Summary  Asthma is a long-term (chronic) condition that causes recurrent episodes in which the airways become tight and narrow. Asthma episodes, also called asthma attacks or asthma flares, can cause coughing, wheezing, shortness of breath, and chest pain.  Asthma cannot be cured, but medicines and lifestyle changes can help keep it well controlled and prevent asthma flares.  Make sure you understand how to avoid triggers and how and when to use your medicines.  Asthma attacks can range from minor to life-threatening. Get help right away if you have an asthma attack and do not respond to treatment with your usual rescue medicines.  This information is not intended to replace advice given to you by your health care provider. Make sure you discuss any questions you have with your health care provider.

## 2023-10-09 NOTE — H&P
"  [facility] Medicine Services  History & Physical          Patient Identification:  Name:  Hugo Colon  Age:  89 y.o.  Sex:  male  :  1930  MRN:  [id number]   Visit Number:  [id number]  Primary Care Physician:  Deysi Collier APRN    I have seen the patient in conjunction with JULI Gustafson and I agree with the following statements:     Subjective     Chief complaint: left leg bleeding, dyspnea     History of presenting illness:    Mr. Colon is a 89 year old male patient who presented to [facility] ED on 11/3/19. He came to the ED because his left leg was bleeding and he couldn't get it to stop. He states his girlfriend tried to get him to go to the doctor about 1-2 weeks ago for worsening dyspnea on exertion but also present on rest. He states he does weigh himself and doesn't believe he has gained any weight. He has had a productive cough but they aren't sure what color. When asked if this is how swollen his legs usually are he states \"I guess\". He denies fevers. No recent hospitalizations. He states he has been taking his medications. He does not wear oxygen at home. He denies any chest pain. Please note Mr. Colon is a poor historian for his past medical history and HPI.     His work up in the ED showed a initial troponin T of <0.010, proBNP 5,350, glucose 145, sodium 141, potassium 5.7, chloride 108, bicarb 24.7, anion gap 8.3, creatinine 1.45, BUN 36, TSH 6.790, free T4 1.23, magnesium 2.2, INR 1.37, WBC 10.81, H&H 13.5 and 42.1, platelet count 143.  Chest x-ray done in the ED per radiology reading shows bibasilar atelectasis cardiomegaly noted, prominent interstitial markings noted small bilateral pleural effusion, radiographic changes of congestive failure.V/S in the ED were Temp 97.6, -129, /83, RR 20. Treatment in the ED included 20mg IV lasix and Cardizem gtt initiated.     His past medical history is significant for atrial fibrillation, hypertension and " DME notified sleep center , pt would like 2nd opinion .    hyperlipidemia. He quit smoking around 30 years ago. He does not have any adult children, he does not have any living siblings. He does have a girlfriend at bedside, Phoenix Kruger.       ---------------------------------------------------------------------------------------------------------------------   Review of Systems   Constitutional: Negative for chills, diaphoresis, fatigue and fever.   HENT: Negative for congestion and rhinorrhea.    Eyes: Negative for visual disturbance.   Respiratory: Positive for cough and shortness of breath.    Cardiovascular: Positive for leg swelling. Negative for chest pain.   Gastrointestinal: Negative for abdominal distention, constipation, diarrhea, nausea and vomiting.   Endocrine: Negative for cold intolerance and heat intolerance.   Genitourinary: Negative for difficulty urinating.   Musculoskeletal: Negative for arthralgias.   Skin: Negative for color change and pallor.   Neurological: Negative for dizziness, weakness and light-headedness.   Hematological: Negative for adenopathy.   Psychiatric/Behavioral: Negative for agitation, behavioral problems and confusion.      ---------------------------------------------------------------------------------------------------------------------   Past Medical History:   Diagnosis Date   • Atrial fibrillation (CMS/HCC)    • Hyperlipidemia    • Hypertension      Past Surgical History:   Procedure Laterality Date   • EXTERNAL EAR SURGERY      cancer   • HEMORRHOIDECTOMY     • HERNIA REPAIR       Family History   Problem Relation Age of Onset   • Hypertension Sister    • Hyperlipidemia Sister    • Hyperlipidemia Brother    • Hypertension Brother      Social History     Socioeconomic History   • Marital status:      Spouse name: Not on file   • Number of children: Not on file   • Years of education: Not on file   • Highest education level: Not on file   Tobacco Use   • Smoking status: Former Smoker     Packs/day: 1.00     Years:  17.00     Pack years: 17.00     Types: Cigarettes     Start date:      Last attempt to quit: 1963     Years since quittin.8   • Smokeless tobacco: Never Used   Substance and Sexual Activity   • Alcohol use: No   • Drug use: No   • Sexual activity: Defer     Comment: derrell has step children     ---------------------------------------------------------------------------------------------------------------------   Allergies:  Levofloxacin and Penicillins  ---------------------------------------------------------------------------------------------------------------------     Medications below are reported home medications pulling from within the system; at this time, these medications have not been reconciled unless otherwise specified and are in the verification process for further verifcation as current home medications.    Prior to Admission Medications     Prescriptions Last Dose Informant Patient Reported? Taking?    apixaban (ELIQUIS) 2.5 MG tablet tablet   No No    Take 1 tablet by mouth Every 12 (Twelve) Hours.    metoprolol tartrate (LOPRESSOR) 50 MG tablet   No No    Take 1 tablet by mouth 2 (Two) Times a Day.    Multiple Vitamins-Minerals (MULTIVITAMIN ADULT PO)   Yes No    Take  by mouth Daily.        Hospital Scheduled Meds:       diltiaZEM 5-15 mg/hr Last Rate: 5 mg/hr (19 1334)     ---------------------------------------------------------------------------------------------------------------------   Objective       Vital Signs:  Temp:  [97.6 °F (36.4 °C)] 97.6 °F (36.4 °C)  Heart Rate:  [107-129] 124  Resp:  [20] 20  BP: (109-123)/(83-98) 115/87      19  1213   Weight: 70.3 kg (155 lb)     Body mass index is 24.28 kg/m².  ---------------------------------------------------------------------------------------------------------------------       Physical Exam    Physical Exam:  Constitutional:  Elderly male in no acute distress  HENT:  Head: Normocephalic and atraumatic.  Mouth:   Moist mucous membranes.    Eyes:  Pupils are equal, round, and reactive to light.    Neck:  Neck supple.   Cardiovascular:  Normal rate, regular rhythm.  with no murmur.  Pulmonary/Chest:  No respiratory distress, no wheezes, no crackles, with normal breath sounds and good air movement.  Abdominal:  Soft.  Bowel sounds are present.  No distension and no tenderness.   Musculoskeletal:  1+ bilateral lower ext pitting edema, no tenderness, and no deformity.    Neurological:  Alert and oriented to person, place, and time.  No tongue deviation.  No facial droop.  No slurred speech.   Skin:  Skin is warm and dry.  No rash noted.  No pallor. Left leg lateral at knee bleeding varicose vein, now controlled.   Psychiatric:  Normal mood and affect.  Behavior is normal.  Judgment and thought content normal.     ---------------------------------------------------------------------------------------------------------------------  EKG:          ---------------------------------------------------------------------------------------------------------------------   Results from last 7 days   Lab Units 11/03/19  1229   WBC 10*3/mm3 10.81*   HEMOGLOBIN g/dL 13.5   HEMATOCRIT % 42.1   MCV fL 96.1   MCHC g/dL 32.1   PLATELETS 10*3/mm3 143   INR  1.37*         Results from last 7 days   Lab Units 11/03/19  1229   SODIUM mmol/L 141   POTASSIUM mmol/L 5.7*   MAGNESIUM mg/dL 2.2   CHLORIDE mmol/L 108*   CO2 mmol/L 24.7   BUN mg/dL 36*   CREATININE mg/dL 1.45*   EGFR IF NONAFRICN AM mL/min/1.73 46*   CALCIUM mg/dL 8.9   GLUCOSE mg/dL 145*   ALBUMIN g/dL 3.51   BILIRUBIN mg/dL 1.0   ALK PHOS U/L 68   AST (SGOT) U/L 27   ALT (SGPT) U/L 27   Estimated Creatinine Clearance: 34.3 mL/min (A) (by C-G formula based on SCr of 1.45 mg/dL (H)).  No results found for: AMMONIA  Results from last 7 days   Lab Units 11/03/19  1229   TROPONIN T ng/mL <0.010     Results from last 7 days   Lab Units 11/03/19  1229   PROBNP pg/mL 5,350.0*     No results found  for: HGBA1C  Lab Results   Component Value Date    TSH 6.790 (H) 11/03/2019    FREET4 1.23 11/03/2019     No results found for: PREGTESTUR, PREGSERUM, HCG, HCGQUANT  Pain Management Panel     There is no flowsheet data to display.                        ---------------------------------------------------------------------------------------------------------------------  Imaging Results (Last 7 Days)     Procedure Component Value Units Date/Time    XR Chest 1 View [[id number]] Collected:  11/03/19 1307     Updated:  11/03/19 1309    Narrative:       Technique: Frontal view the chest.     COMPARISON:  None     INDICATION:     soa      FINDINGS:    Bibasilar atelectasis. Cardiomegaly is noted. Prominent  interstitial markings are noted. There are small bilateral pleural  effusions.        Impression:       Radiographic changes of congestive failure.     This report was finalized on 11/3/2019 1:07 PM by Dr. Keith Loera MD.             Cultures:none     ---------------------------------------------------------------------------------------------------------------------  Assessment / Plan       Assessment and Plan:      SIRS without significant source of infection: (leukocytosis (10.81), tachycardia (-129), LEVAR): chest xray does not appreciate any concern for infection, no fevers. Above Likely from a-fib with rvr. UA ordered, monitor off antibiotics for now, repeat labs in the am.     Persistent A-Fib with RVR, on chronic oral anticoagulation: continue eliquis, continue Cardizem per protocol. Rate is better controlled on my exam at .     Acute on Chronic Systolic HF, EF 35%:  Lasix given in the ED, monitor BP and renal tolerance. Daily weights, trend troponin. Echo ordered. Repeat BNP ordered for the am.     Severe aortic Valve stenosis: monitor fluid status closey.     LEVAR on CKD stage III with mild hyperkalemia: creatinine is 1.45, potassium is 5.7, lasix was given in the ED, repeat potassium ordered for  1600 ordered.     Small bilateral pleural effusions: diuresis as noted above, monitor.    Bleeding lateral (left) varicose vein: bleeding now controlled, patient denies any injury or scratching it, monitor closely.      Hypothyroidism: TSH is 6.790, Free T4 is normal at 1.23, likely euthyroid sick syndrome, would recommended repeat TSH in 3-4 weeks outpatient.     Activity: bedrest, turn q2hr   Diet: Cardiac   Precautions: falls   DVT prophylaxis: continue PO eliquis     Code status: Full     Patient is high risk for the following reasons: Persistent A-Fib with RVR, Acute on chronic systolic HF, EF 35%    Karina Mortensen, APRN  11/03/19  1:49 PM  ---------------------------------------------------------------------------------------------------------------------   I have seen and examined the patient. I agree with the assessment and plan of Karina Mortensen, APRN.    89-year-old male with past medical history significant for persistent atrial fibrillation, chronic kidney disease and chronic systolic CHF presented to the emergency department today with bleeding from his left leg.  Patient also admits that he has been having shortness of breath for last 2 weeks.  Patient states that since last 2 weeks he is unable to walk around the house due to exertional dyspnea which is new.  Patient denies any fevers or chills.  He denies any cough, sputum production.  He denies any chest pain or palpitations.  Patient denies any dizziness or lightheadedness.  He woke up this morning and was bleeding from his left leg and therefore presented to the emergency department.    Constitutional: Elderly male, lying in bed, no acute distress.  HEENT: Pupils are equal and reactive to light.  Fundi not seen.   Neck: Supple.  No JVD  Lungs: Clear to auscultation bilaterally.  CVS: Irregularly irregular rhythm, tachycardic S1 + S2, no murmurs, rubs or gallops.  Abdomen: Soft, nontender, no viscera was palpable, bowel sounds  audible.  Peripheral vascular: Dorsalis pedis palpable and no edema.  It has varicose veins and has bandage in his left leg from previous punctate bleeding.  Neurologic: Awake, alert and oriented to place, person and time.  Cranial nerves grossly intact.  Strength bilaterally symmetrical in upper and lower extremities.      A/P:  -Persistent A. fib  -Nonrheumatic aortic valve stenosis  -CKD stage III  -Cardiomyopathy, EF of 35% by echo on 7/19.  -Hyperkalemia  -Elevated BNP  -Acute decompensation of chronic systolic CHF  -Hypertension    Patient has been started on Cardizem drip for rate control and will continue.  Patient denies missing any doses.  Continue metoprolol.  Continue anticoagulation with Eliquis for stroke prophylaxis.  Patient had an echocardiogram done in July that showed EF of 35%.  I will repeat an echocardiogram to evaluate LV SF.  Will consult cardiology for expert opinion and management.  Continue patient beta-blocker and start gentle diuresis.  Monitor intake and output and daily weight.  Will hold ACE inhibitor due to chronic kidney disease.  Monitor renal function and avoid nephrotoxic agents.  TSH is elevated but free T4 is within normal limits.

## (undated) DEVICE — IRRIGATOR BULB ASEPTO 60CC STRL

## (undated) DEVICE — LEX ELECTRO PHYSIOLOGY: Brand: MEDLINE INDUSTRIES, INC.

## (undated) DEVICE — LIMB HOLDER, WRIST/ANKLE: Brand: DEROYAL

## (undated) DEVICE — DECANT BG O JET

## (undated) DEVICE — INTRO SHEATH ART/FEM ENGAGE .035 8F12CM

## (undated) DEVICE — TUBING, SUCTION, 1/4" X 10', STRAIGHT: Brand: MEDLINE

## (undated) DEVICE — KT ACC SELECTRA SYS LD DEL W/INTRO

## (undated) DEVICE — SOL NS 500ML

## (undated) DEVICE — PENCL E/S HNDSWCH ROCKRBTN HOLSTR 10FT

## (undated) DEVICE — TEMPERATURE ABLATION CATHETER: Brand: BLAZER® II XP

## (undated) DEVICE — DRSNG SURG AQUACEL AG 9X15CM

## (undated) DEVICE — SYS DEL LD SELECTRA EXT/HK45 W/INTRO

## (undated) DEVICE — ST EXT IV SMARTSITE 2VLV SP M LL 5ML IV1

## (undated) DEVICE — SOL NACL 0.9PCT 1000ML

## (undated) DEVICE — CANN NASL CO2 DIVIDED A/

## (undated) DEVICE — CAUTERY TIP POLISHER: Brand: DEVON

## (undated) DEVICE — MEDI-VAC YANKAUER SUCTION HANDLE W/BULBOUS TIP: Brand: CARDINAL HEALTH

## (undated) DEVICE — PROXIMATE RH ROTATING HEAD SKIN STAPLERS (35 WIDE) CONTAINS 35 STAINLESS STEEL STAPLES: Brand: PROXIMATE

## (undated) DEVICE — ST INF PRI SMRTSTE 20DRP 2VLV 24ML 117

## (undated) DEVICE — ADULT, W/LG. BACK PAD, RADIOTRANSPARENT ELEMENT AND LEAD WIRE: Brand: DEFIBRILLATION ELECTRODES